# Patient Record
Sex: MALE | Race: WHITE | NOT HISPANIC OR LATINO | Employment: UNEMPLOYED | URBAN - METROPOLITAN AREA
[De-identification: names, ages, dates, MRNs, and addresses within clinical notes are randomized per-mention and may not be internally consistent; named-entity substitution may affect disease eponyms.]

---

## 2023-04-12 ENCOUNTER — TELEPHONE (OUTPATIENT)
Dept: PRIMARY CARE | Facility: CLINIC | Age: 33
End: 2023-04-12
Payer: MEDICAID

## 2023-04-12 NOTE — TELEPHONE ENCOUNTER
Pt went to the ER on Thursday due to left foot swelling and tingling at times. Pt was told to follow up with you if still swollen. It is painful all the time. Last night, pt was SOB but that is mostly gone now. No other symptoms.

## 2023-04-13 ENCOUNTER — OFFICE VISIT (OUTPATIENT)
Dept: PRIMARY CARE | Facility: CLINIC | Age: 33
End: 2023-04-13
Payer: MEDICAID

## 2023-04-13 ENCOUNTER — LAB (OUTPATIENT)
Dept: LAB | Facility: LAB | Age: 33
End: 2023-04-13
Payer: MEDICAID

## 2023-04-13 VITALS
RESPIRATION RATE: 20 BRPM | OXYGEN SATURATION: 96 % | WEIGHT: 258 LBS | BODY MASS INDEX: 34.95 KG/M2 | SYSTOLIC BLOOD PRESSURE: 108 MMHG | TEMPERATURE: 96.9 F | HEIGHT: 72 IN | HEART RATE: 76 BPM | DIASTOLIC BLOOD PRESSURE: 74 MMHG

## 2023-04-13 DIAGNOSIS — M79.89 SWELLING OF LEFT FOOT: Primary | ICD-10-CM

## 2023-04-13 DIAGNOSIS — Z79.899 MEDICATION MANAGEMENT: ICD-10-CM

## 2023-04-13 DIAGNOSIS — M25.40 JOINT SWELLING: ICD-10-CM

## 2023-04-13 PROBLEM — M54.50 LOW BACK PAIN: Status: ACTIVE | Noted: 2023-04-13

## 2023-04-13 PROBLEM — G43.019 COMMON MIGRAINE WITH INTRACTABLE MIGRAINE: Status: ACTIVE | Noted: 2019-12-05

## 2023-04-13 PROBLEM — H02.826 MILIA OF EYELID OF LEFT EYE: Status: ACTIVE | Noted: 2023-04-13

## 2023-04-13 PROBLEM — K92.1 HEMATOCHEZIA: Status: ACTIVE | Noted: 2023-04-13

## 2023-04-13 PROBLEM — R60.9 PERIPHERAL EDEMA: Status: ACTIVE | Noted: 2023-04-13

## 2023-04-13 PROBLEM — R13.10 DYSPHAGIA: Status: ACTIVE | Noted: 2023-04-13

## 2023-04-13 PROBLEM — G40.219: Status: ACTIVE | Noted: 2019-12-05

## 2023-04-13 PROBLEM — D17.1 LIPOMA OF ABDOMINAL WALL: Status: ACTIVE | Noted: 2023-04-13

## 2023-04-13 PROBLEM — R56.9 SEIZURE (MULTI): Status: ACTIVE | Noted: 2020-10-08

## 2023-04-13 PROBLEM — T14.91XA SUICIDAL BEHAVIOR WITH ATTEMPTED SELF-INJURY (MULTI): Status: ACTIVE | Noted: 2022-11-15

## 2023-04-13 PROBLEM — K64.9 HEMORRHOIDS: Status: ACTIVE | Noted: 2023-04-13

## 2023-04-13 PROBLEM — M79.662 PAIN OF LEFT CALF: Status: ACTIVE | Noted: 2023-04-13

## 2023-04-13 PROBLEM — K44.9 HIATAL HERNIA: Status: ACTIVE | Noted: 2023-04-13

## 2023-04-13 PROBLEM — F31.9 BIPOLAR DISORDER, UNSPECIFIED (MULTI): Status: ACTIVE | Noted: 2023-04-13

## 2023-04-13 PROBLEM — L08.9 SKIN INFECTION: Status: ACTIVE | Noted: 2023-04-13

## 2023-04-13 PROBLEM — J06.9 ACUTE URI: Status: ACTIVE | Noted: 2023-04-13

## 2023-04-13 PROBLEM — R44.0 AUDITORY HALLUCINATIONS: Status: ACTIVE | Noted: 2022-11-15

## 2023-04-13 PROBLEM — B37.2 CANDIDAL INTERTRIGO: Status: ACTIVE | Noted: 2023-04-13

## 2023-04-13 PROBLEM — R60.0 PERIPHERAL EDEMA: Status: ACTIVE | Noted: 2023-04-13

## 2023-04-13 PROBLEM — G51.4 FACIAL MYOKYMIA: Status: ACTIVE | Noted: 2019-12-05

## 2023-04-13 PROBLEM — L72.3 SEBACEOUS CYST: Status: ACTIVE | Noted: 2023-04-13

## 2023-04-13 PROBLEM — K21.9 GERD (GASTROESOPHAGEAL REFLUX DISEASE): Status: ACTIVE | Noted: 2023-04-13

## 2023-04-13 PROBLEM — M79.672 LEFT FOOT PAIN: Status: ACTIVE | Noted: 2023-04-13

## 2023-04-13 PROBLEM — L72.0 EPIDERMAL INCLUSION CYST: Status: ACTIVE | Noted: 2023-04-13

## 2023-04-13 PROBLEM — F64.9 GENDER DYSPHORIA: Status: ACTIVE | Noted: 2023-04-13

## 2023-04-13 PROBLEM — R10.9 ABDOMINAL PAIN OF UNKNOWN CAUSE: Status: ACTIVE | Noted: 2023-04-13

## 2023-04-13 PROBLEM — G40.209 PARTIAL SYMPTOMATIC EPILEPSY WITH COMPLEX PARTIAL SEIZURES, NOT INTRACTABLE, WITHOUT STATUS EPILEPTICUS (MULTI): Status: ACTIVE | Noted: 2023-04-13

## 2023-04-13 LAB
ALANINE AMINOTRANSFERASE (SGPT) (U/L) IN SER/PLAS: 26 U/L (ref 10–52)
ALBUMIN (G/DL) IN SER/PLAS: 4.3 G/DL (ref 3.4–5)
ALKALINE PHOSPHATASE (U/L) IN SER/PLAS: 59 U/L (ref 33–120)
ANION GAP IN SER/PLAS: 10 MMOL/L (ref 10–20)
ASPARTATE AMINOTRANSFERASE (SGOT) (U/L) IN SER/PLAS: 22 U/L (ref 9–39)
BILIRUBIN TOTAL (MG/DL) IN SER/PLAS: 0.5 MG/DL (ref 0–1.2)
C REACTIVE PROTEIN (MG/L) IN SER/PLAS: 0.42 MG/DL
CALCIUM (MG/DL) IN SER/PLAS: 8.5 MG/DL (ref 8.6–10.3)
CARBON DIOXIDE, TOTAL (MMOL/L) IN SER/PLAS: 25 MMOL/L (ref 21–32)
CHLORIDE (MMOL/L) IN SER/PLAS: 107 MMOL/L (ref 98–107)
CREATININE (MG/DL) IN SER/PLAS: 1.13 MG/DL (ref 0.5–1.3)
ESTRADIOL (PG/ML) IN SER/PLAS: 557 PG/ML
GFR MALE: 88 ML/MIN/1.73M2
GLUCOSE (MG/DL) IN SER/PLAS: 77 MG/DL (ref 74–99)
POTASSIUM (MMOL/L) IN SER/PLAS: 3.8 MMOL/L (ref 3.5–5.3)
PROTEIN TOTAL: 6.5 G/DL (ref 6.4–8.2)
SEDIMENTATION RATE, ERYTHROCYTE: 11 MM/H (ref 0–15)
SODIUM (MMOL/L) IN SER/PLAS: 138 MMOL/L (ref 136–145)
TESTOSTERONE (NG/DL) IN SER/PLAS: <60 NG/DL (ref 240–1000)
URATE (MG/DL) IN SER/PLAS: 6 MG/DL (ref 4–7.5)
UREA NITROGEN (MG/DL) IN SER/PLAS: 15 MG/DL (ref 6–23)

## 2023-04-13 PROCEDURE — 84403 ASSAY OF TOTAL TESTOSTERONE: CPT

## 2023-04-13 PROCEDURE — 99214 OFFICE O/P EST MOD 30 MIN: CPT | Performed by: FAMILY MEDICINE

## 2023-04-13 PROCEDURE — 80053 COMPREHEN METABOLIC PANEL: CPT

## 2023-04-13 PROCEDURE — 82670 ASSAY OF TOTAL ESTRADIOL: CPT

## 2023-04-13 PROCEDURE — 85652 RBC SED RATE AUTOMATED: CPT

## 2023-04-13 PROCEDURE — 86140 C-REACTIVE PROTEIN: CPT

## 2023-04-13 PROCEDURE — 36415 COLL VENOUS BLD VENIPUNCTURE: CPT

## 2023-04-13 PROCEDURE — 3008F BODY MASS INDEX DOCD: CPT | Performed by: FAMILY MEDICINE

## 2023-04-13 PROCEDURE — 1036F TOBACCO NON-USER: CPT | Performed by: FAMILY MEDICINE

## 2023-04-13 PROCEDURE — 84550 ASSAY OF BLOOD/URIC ACID: CPT

## 2023-04-13 PROCEDURE — 82679 ASSAY OF ESTRONE: CPT

## 2023-04-13 RX ORDER — ESTRADIOL VALERATE 20 MG/ML
INJECTION INTRAMUSCULAR WEEKLY
COMMUNITY
Start: 2021-05-11 | End: 2023-06-19 | Stop reason: SDUPTHER

## 2023-04-13 RX ORDER — SYRINGE WITH NEEDLE, 1 ML 25GX5/8"
SYRINGE, EMPTY DISPOSABLE MISCELLANEOUS
COMMUNITY
Start: 2023-03-25 | End: 2023-11-13

## 2023-04-13 RX ORDER — NYSTATIN 100000 U/G
OINTMENT TOPICAL 2 TIMES DAILY
COMMUNITY
Start: 2023-02-03 | End: 2023-06-19 | Stop reason: WASHOUT

## 2023-04-13 RX ORDER — LEVETIRACETAM 750 MG/1
1500 TABLET, EXTENDED RELEASE ORAL 2 TIMES DAILY
COMMUNITY
End: 2024-05-16 | Stop reason: SDUPTHER

## 2023-04-13 RX ORDER — GUAIFENESIN 1200 MG
325 TABLET, EXTENDED RELEASE 12 HR ORAL EVERY 6 HOURS PRN
COMMUNITY
Start: 2021-11-02 | End: 2023-06-19 | Stop reason: WASHOUT

## 2023-04-13 RX ORDER — PRAZOSIN HYDROCHLORIDE 2 MG/1
1 CAPSULE ORAL NIGHTLY
COMMUNITY
Start: 2021-06-02 | End: 2023-06-19 | Stop reason: WASHOUT

## 2023-04-13 RX ORDER — ZONISAMIDE 100 MG/1
2 CAPSULE ORAL NIGHTLY
COMMUNITY
Start: 2018-11-02

## 2023-04-13 RX ORDER — BICALUTAMIDE 50 MG/1
50 TABLET, FILM COATED ORAL
COMMUNITY
End: 2023-05-02

## 2023-04-13 RX ORDER — OMEPRAZOLE 20 MG/1
40 CAPSULE, DELAYED RELEASE ORAL DAILY
COMMUNITY
End: 2023-06-19 | Stop reason: WASHOUT

## 2023-04-13 RX ORDER — FUROSEMIDE 20 MG/1
20 TABLET ORAL DAILY
Qty: 5 TABLET | Refills: 0 | Status: SHIPPED | OUTPATIENT
Start: 2023-04-13 | End: 2024-01-08 | Stop reason: ALTCHOICE

## 2023-04-13 RX ORDER — TRAZODONE HYDROCHLORIDE 100 MG/1
100 TABLET ORAL NIGHTLY
COMMUNITY
Start: 2018-01-10

## 2023-04-13 RX ORDER — SPIRONOLACTONE 100 MG/1
100 TABLET, FILM COATED ORAL DAILY
COMMUNITY
End: 2023-06-19 | Stop reason: WASHOUT

## 2023-04-13 RX ORDER — ARIPIPRAZOLE 20 MG/1
20 TABLET ORAL DAILY
COMMUNITY
Start: 2022-11-22 | End: 2023-06-19 | Stop reason: WASHOUT

## 2023-04-13 RX ORDER — LAMOTRIGINE 200 MG/1
1 TABLET ORAL DAILY
COMMUNITY
Start: 2020-10-10 | End: 2024-05-16 | Stop reason: DRUGHIGH

## 2023-04-13 SDOH — ECONOMIC STABILITY: FOOD INSECURITY: WITHIN THE PAST 12 MONTHS, THE FOOD YOU BOUGHT JUST DIDN'T LAST AND YOU DIDN'T HAVE MONEY TO GET MORE.: NEVER TRUE

## 2023-04-13 SDOH — ECONOMIC STABILITY: FOOD INSECURITY: WITHIN THE PAST 12 MONTHS, YOU WORRIED THAT YOUR FOOD WOULD RUN OUT BEFORE YOU GOT MONEY TO BUY MORE.: NEVER TRUE

## 2023-04-13 ASSESSMENT — LIFESTYLE VARIABLES
AUDIT-C TOTAL SCORE: 0
SKIP TO QUESTIONS 9-10: 1
HOW MANY STANDARD DRINKS CONTAINING ALCOHOL DO YOU HAVE ON A TYPICAL DAY: PATIENT DOES NOT DRINK
HOW OFTEN DO YOU HAVE SIX OR MORE DRINKS ON ONE OCCASION: NEVER
HOW OFTEN DO YOU HAVE A DRINK CONTAINING ALCOHOL: NEVER

## 2023-04-13 ASSESSMENT — ENCOUNTER SYMPTOMS
LOSS OF SENSATION IN FEET: 0
DEPRESSION: 0
OCCASIONAL FEELINGS OF UNSTEADINESS: 0

## 2023-04-13 ASSESSMENT — PATIENT HEALTH QUESTIONNAIRE - PHQ9
SUM OF ALL RESPONSES TO PHQ9 QUESTIONS 1 & 2: 0
1. LITTLE INTEREST OR PLEASURE IN DOING THINGS: NOT AT ALL
2. FEELING DOWN, DEPRESSED OR HOPELESS: NOT AT ALL

## 2023-04-13 NOTE — PATIENT INSTRUCTIONS
Thank you for choosing [facility] for your healthcare.   As always if you have any questions or concerns please do not hesitate to call our office at [phone] or through [employer].    Have a great day!  Minda Veliz, DO

## 2023-04-13 NOTE — PROGRESS NOTES
"Subjective   Patient ID: Jennifer Terrell is a 32 y.o. male who presents for ER Follow-up (4/06/23 left foot pain and swelling.).  Edema    Presents with new edema. Episode onset: 8 days ago. The onset of the episode was sudden. These episodes happen throughout the day. The problem presents itself constantly. The problem has been waxing and waning. The edema is present on the left side(s). Risk factors for edema include no known risk factors.     Associated symptoms comment: allodynia.   Treatments tried include nothing.   Denies redness or specific joint pain. Overall is improved from when it started. The swelling is least first thing in the morning and worses as the day progresses. It is mostly localized to left foot though on the day she went to ER, the swelling was up to thigh. Ultrasound was negative for DVT 4/6.    Current Outpatient Medications   Medication Sig Dispense Refill    acetaminophen (TylenoL) 325 mg capsule Take 1 capsule (325 mg) by mouth every 6 hours if needed.      ARIPiprazole (Abilify) 20 mg tablet Take 1 tablet (20 mg) by mouth once daily.      BD Luer-Paul Syringe 3 mL 23 x 1\" syringe USE 1 SYRINGE ONCE A WEEK      estradiol valerate (Delestrogen) 20 mg/mL injection Inject into the shoulder, thigh, or buttocks per week.      lamoTRIgine (LaMICtal) 200 mg tablet Take 1 tablet (200 mg) by mouth once daily.      nystatin (Mycostatin) ointment Apply topically 2 times a day.      prazosin (Minipress) 2 mg capsule Take 1 capsule (2 mg) by mouth once daily at bedtime.      traZODone (Desyrel) 100 mg tablet Take 1 tablet (100 mg) by mouth once daily at bedtime.      zonisamide (Zonegran) 100 mg capsule Take 2 capsules (200 mg) by mouth once daily at bedtime.      bicalutamide (Casodex) 50 mg tablet Take 1 tablet (50 mg total) by mouth once daily.      furosemide (Lasix) 20 mg tablet Take 1 tablet (20 mg) by mouth once daily for 5 days. 5 tablet 0    levETIRAcetam XR (Keppra XR) 750 mg tablet extended " release 24 hr 24 hr tablet Take 2 tablets (1,500 mg) by mouth in the morning and 2 tablets (1,500 mg) before bedtime.      omeprazole (PriLOSEC) 20 mg DR capsule Take 2 capsules (40 mg) by mouth once daily.      spironolactone (Aldactone) 100 mg tablet Take 1 tablet (100 mg) by mouth once daily.       No current facility-administered medications for this visit.     Past Surgical History:   Procedure Laterality Date    INNER EAR SURGERY  03/08/2018    Inner Ear Surgery    OTHER SURGICAL HISTORY  04/05/2022    Oral surgery    OTHER SURGICAL HISTORY  08/06/2021    Esophagogastroduodenoscopy    OTHER SURGICAL HISTORY  08/06/2021    Colonoscopy    TONSILLECTOMY  03/08/2018    Tonsillectomy     No family history on file.   Social History     Tobacco Use    Smoking status: Never     Passive exposure: Never    Smokeless tobacco: Never   Substance Use Topics    Alcohol use: Not Currently    Drug use: Never        Objective     Visit Vitals  /74 (BP Location: Left arm, Patient Position: Sitting, BP Cuff Size: Large adult)   Pulse 76   Temp 36.1 °C (96.9 °F)   Resp 20   Ht 1.829 m (6')   Wt 117 kg (258 lb)   SpO2 96%   BMI 34.99 kg/m²   Smoking Status Never   BSA 2.44 m²        Constitutional: Well nourished, well developed, appears stated age  Eyes: no scleral icterus, no conjunctival injection  Respiratory: normal respiratory effort  Lower extremities: left foot and ankle currently mild swollen when compared to the left. Non-pitting edema. Tender to palpation. No erythema or skin breakdown.   Neurologic: Alert, CNs II-XII grossly intact..  Psych: Appropriate mood and affect.        Assessment/Plan   Problem List Items Addressed This Visit    None  Visit Diagnoses       Swelling of left foot    -  Primary    Relevant Medications    furosemide (Lasix) 20 mg tablet    Medication management        Relevant Orders    Comprehensive metabolic panel (Completed)    Estradiol    Testosterone    Estrone    Joint swelling         Relevant Orders    C-reactive protein (Completed)    Sedimentation Rate (Completed)    Uric acid (Completed)          Keep scheduled appointment.     Virginia Factor, DO

## 2023-04-19 LAB — ESTRONE: 115.8 PG/ML (ref 9–36)

## 2023-05-02 DIAGNOSIS — F64.9 GENDER DYSPHORIA: Primary | ICD-10-CM

## 2023-05-02 RX ORDER — PANTOPRAZOLE SODIUM 40 MG/1
TABLET, DELAYED RELEASE ORAL
COMMUNITY
Start: 2023-04-13 | End: 2023-06-19 | Stop reason: WASHOUT

## 2023-05-02 RX ORDER — BICALUTAMIDE 50 MG/1
50 TABLET, FILM COATED ORAL DAILY
Qty: 90 TABLET | Refills: 3 | Status: SHIPPED | OUTPATIENT
Start: 2023-05-02 | End: 2024-02-09 | Stop reason: SDUPTHER

## 2023-06-06 ENCOUNTER — TELEPHONE (OUTPATIENT)
Dept: PRIMARY CARE | Facility: CLINIC | Age: 33
End: 2023-06-06
Payer: MEDICAID

## 2023-06-06 DIAGNOSIS — J30.89 ENVIRONMENTAL AND SEASONAL ALLERGIES: Primary | ICD-10-CM

## 2023-06-06 NOTE — TELEPHONE ENCOUNTER
Pt called in and stated that they were given an allergy med while in the hospital. Pt states they are almost out and are needing a refill.     Pt's pharmacy is [facility]

## 2023-06-07 RX ORDER — CETIRIZINE HYDROCHLORIDE 10 MG/1
10 TABLET ORAL DAILY
Qty: 90 TABLET | Refills: 3 | Status: SHIPPED | OUTPATIENT
Start: 2023-06-07 | End: 2024-06-10 | Stop reason: SDUPTHER

## 2023-06-07 RX ORDER — CETIRIZINE HYDROCHLORIDE 10 MG/1
10 TABLET ORAL DAILY
Qty: 30 TABLET | Refills: 0 | COMMUNITY
Start: 2023-05-09 | End: 2023-06-07 | Stop reason: SDUPTHER

## 2023-06-15 PROBLEM — G43.019 REFRACTORY MIGRAINE WITHOUT AURA: Status: ACTIVE | Noted: 2019-12-05

## 2023-06-15 PROBLEM — G40.909 EPILEPSY (MULTI): Status: ACTIVE | Noted: 2023-06-15

## 2023-06-15 PROBLEM — Z78.9 MALE-TO-FEMALE TRANSGENDER PERSON: Status: ACTIVE | Noted: 2023-05-05

## 2023-06-15 PROBLEM — F32.A DEPRESSION: Status: ACTIVE | Noted: 2023-05-03

## 2023-06-15 RX ORDER — MULTIPLE VITAMINS W/ MINERALS TAB 9MG-400MCG
1 TAB ORAL DAILY
COMMUNITY
Start: 2023-05-09 | End: 2023-06-19 | Stop reason: WASHOUT

## 2023-06-15 RX ORDER — HYDROCORTISONE 25 MG/G
CREAM TOPICAL 2 TIMES DAILY
COMMUNITY
Start: 2023-06-09 | End: 2023-06-19 | Stop reason: WASHOUT

## 2023-06-15 RX ORDER — PROGESTERONE 200 MG/1
1 CAPSULE ORAL DAILY
COMMUNITY
Start: 2022-01-04 | End: 2023-06-19 | Stop reason: WASHOUT

## 2023-06-15 RX ORDER — ESOMEPRAZOLE MAGNESIUM 40 MG/1
CAPSULE, DELAYED RELEASE ORAL
COMMUNITY
Start: 2023-06-01 | End: 2023-06-19 | Stop reason: WASHOUT

## 2023-06-15 RX ORDER — MULTIVITAMIN
1 TABLET ORAL
COMMUNITY
End: 2023-07-18 | Stop reason: WASHOUT

## 2023-06-15 RX ORDER — DICYCLOMINE HYDROCHLORIDE 20 MG/1
20 TABLET ORAL
COMMUNITY
End: 2023-06-19 | Stop reason: WASHOUT

## 2023-06-15 RX ORDER — ARIPIPRAZOLE 15 MG/1
15 TABLET ORAL DAILY
COMMUNITY
End: 2024-02-08 | Stop reason: WASHOUT

## 2023-06-15 RX ORDER — PROGESTERONE 200 MG/1
200 CAPSULE ORAL DAILY
COMMUNITY
Start: 2023-05-08 | End: 2024-01-09

## 2023-06-15 RX ORDER — HYDROCORTISONE ACETATE 25 MG/1
25 SUPPOSITORY RECTAL 2 TIMES DAILY
COMMUNITY
Start: 2023-06-01 | End: 2024-01-08 | Stop reason: ALTCHOICE

## 2023-06-19 ENCOUNTER — OFFICE VISIT (OUTPATIENT)
Dept: PRIMARY CARE | Facility: CLINIC | Age: 33
End: 2023-06-19
Payer: MEDICAID

## 2023-06-19 ENCOUNTER — TELEPHONE (OUTPATIENT)
Dept: PRIMARY CARE | Facility: CLINIC | Age: 33
End: 2023-06-19

## 2023-06-19 VITALS
TEMPERATURE: 97.4 F | RESPIRATION RATE: 16 BRPM | SYSTOLIC BLOOD PRESSURE: 108 MMHG | HEIGHT: 72 IN | OXYGEN SATURATION: 97 % | WEIGHT: 276 LBS | HEART RATE: 73 BPM | DIASTOLIC BLOOD PRESSURE: 72 MMHG | BODY MASS INDEX: 37.38 KG/M2

## 2023-06-19 DIAGNOSIS — F31.9 BIPOLAR DEPRESSION (MULTI): ICD-10-CM

## 2023-06-19 DIAGNOSIS — F64.9 GENDER DYSPHORIA: Primary | ICD-10-CM

## 2023-06-19 DIAGNOSIS — J30.89 ENVIRONMENTAL AND SEASONAL ALLERGIES: ICD-10-CM

## 2023-06-19 DIAGNOSIS — G40.209 PARTIAL SYMPTOMATIC EPILEPSY WITH COMPLEX PARTIAL SEIZURES, NOT INTRACTABLE, WITHOUT STATUS EPILEPTICUS (MULTI): ICD-10-CM

## 2023-06-19 PROBLEM — J06.9 ACUTE URI: Status: RESOLVED | Noted: 2023-04-13 | Resolved: 2023-06-19

## 2023-06-19 PROBLEM — R44.0 AUDITORY HALLUCINATIONS: Status: RESOLVED | Noted: 2022-11-15 | Resolved: 2023-06-19

## 2023-06-19 PROBLEM — G43.019 COMMON MIGRAINE WITH INTRACTABLE MIGRAINE: Status: RESOLVED | Noted: 2019-12-05 | Resolved: 2023-06-19

## 2023-06-19 PROBLEM — B37.2 CANDIDAL INTERTRIGO: Status: RESOLVED | Noted: 2023-04-13 | Resolved: 2023-06-19

## 2023-06-19 PROBLEM — M79.662 PAIN OF LEFT CALF: Status: RESOLVED | Noted: 2023-04-13 | Resolved: 2023-06-19

## 2023-06-19 PROBLEM — L72.0 EPIDERMAL INCLUSION CYST: Status: RESOLVED | Noted: 2023-04-13 | Resolved: 2023-06-19

## 2023-06-19 PROBLEM — T14.91XA SUICIDAL BEHAVIOR WITH ATTEMPTED SELF-INJURY (MULTI): Status: RESOLVED | Noted: 2022-11-15 | Resolved: 2023-06-19

## 2023-06-19 PROBLEM — L08.9 SKIN INFECTION: Status: RESOLVED | Noted: 2023-04-13 | Resolved: 2023-06-19

## 2023-06-19 PROBLEM — K92.1 HEMATOCHEZIA: Status: RESOLVED | Noted: 2023-04-13 | Resolved: 2023-06-19

## 2023-06-19 PROBLEM — H02.826 MILIA OF EYELID OF LEFT EYE: Status: RESOLVED | Noted: 2023-04-13 | Resolved: 2023-06-19

## 2023-06-19 PROBLEM — M79.672 LEFT FOOT PAIN: Status: RESOLVED | Noted: 2023-04-13 | Resolved: 2023-06-19

## 2023-06-19 PROBLEM — Z78.9 MALE-TO-FEMALE TRANSGENDER PERSON: Status: RESOLVED | Noted: 2023-05-05 | Resolved: 2023-06-19

## 2023-06-19 PROBLEM — G40.219: Status: RESOLVED | Noted: 2019-12-05 | Resolved: 2023-06-19

## 2023-06-19 PROBLEM — L72.3 SEBACEOUS CYST: Status: RESOLVED | Noted: 2023-04-13 | Resolved: 2023-06-19

## 2023-06-19 PROBLEM — G40.909 EPILEPSY (MULTI): Status: RESOLVED | Noted: 2023-06-15 | Resolved: 2023-06-19

## 2023-06-19 PROBLEM — G43.019 REFRACTORY MIGRAINE WITHOUT AURA: Status: RESOLVED | Noted: 2019-12-05 | Resolved: 2023-06-19

## 2023-06-19 PROBLEM — R10.9 ABDOMINAL PAIN OF UNKNOWN CAUSE: Status: RESOLVED | Noted: 2023-04-13 | Resolved: 2023-06-19

## 2023-06-19 PROBLEM — R56.9 SEIZURE (MULTI): Status: RESOLVED | Noted: 2020-10-08 | Resolved: 2023-06-19

## 2023-06-19 PROCEDURE — 99214 OFFICE O/P EST MOD 30 MIN: CPT | Performed by: FAMILY MEDICINE

## 2023-06-19 PROCEDURE — 1036F TOBACCO NON-USER: CPT | Performed by: FAMILY MEDICINE

## 2023-06-19 PROCEDURE — 3008F BODY MASS INDEX DOCD: CPT | Performed by: FAMILY MEDICINE

## 2023-06-19 RX ORDER — ESTRADIOL VALERATE 20 MG/ML
INJECTION INTRAMUSCULAR
Qty: 5 ML | Refills: 11 | Status: SHIPPED | OUTPATIENT
Start: 2023-06-19 | End: 2024-01-09

## 2023-06-19 ASSESSMENT — ENCOUNTER SYMPTOMS
DEPRESSION: 0
OCCASIONAL FEELINGS OF UNSTEADINESS: 0
LOSS OF SENSATION IN FEET: 0

## 2023-06-19 NOTE — ASSESSMENT & PLAN NOTE
>>ASSESSMENT AND PLAN FOR GENDER DYSPHORIA WRITTEN ON 6/19/2023  2:54 PM BY SIMIN MILLER, DO    Continue estradiol weekly injection  Continue progesterone 200 mg daily and bicalutamide 50 mg daily

## 2023-06-19 NOTE — ASSESSMENT & PLAN NOTE
Recommended she try taking the cetirizine in the morning and to let me know if symptoms still do not improve

## 2023-06-19 NOTE — PATIENT INSTRUCTIONS
Thank you for choosing [facility] for your healthcare.   As always if you have any questions or concerns please do not hesitate to call our office at [phone] or through [employer].    Have a great day!  Minda Veliz, DO

## 2023-06-19 NOTE — PROGRESS NOTES
"Subjective   Patient ID: Jennifer Terrell is a 32 y.o. adult who presents for Follow-up (Would like to talk about estrogen and bottom surgery) and Mental Health Problem (Pt was hospitalized for a weekend due to mental health problems).  She plans on pursuing bottom surgery later this year. She sometimes experiences itching at injection site of estradiol.     She was hospitalized for about 4 days in May for suicidal ideation. She sees psychiatry and her therapist regularly.     She is currently on cetirizine for environmental allergies. She is taking it at night and still notices sneezing and nasal congestion during the day.          Current Outpatient Medications   Medication Sig Dispense Refill    ARIPiprazole (Abilify) 15 mg tablet Take 10 mg by mouth once daily.      BD Luer-Paul Syringe 3 mL 23 x 1\" syringe USE 1 SYRINGE ONCE A WEEK      bicalutamide (Casodex) 50 mg tablet Take 1 tablet (50 mg total) by mouth once daily. 90 tablet 3    cetirizine (ZyrTEC) 10 mg tablet Take 1 tablet (10 mg) by mouth once daily. Allergy medication 90 tablet 3    hydrocortisone (Anusol-HC) 25 mg suppository Insert 1 suppository (25 mg) into the rectum twice a day.      lamoTRIgine (LaMICtal) 200 mg tablet Take 1 tablet (200 mg) by mouth once daily.      levETIRAcetam XR (Keppra XR) 750 mg tablet extended release 24 hr 24 hr tablet Take 2 tablets (1,500 mg) by mouth 2 times a day.      multivitamin tablet Take 1 tablet by mouth once daily.      progesterone (Prometrium) 200 mg capsule Take 1 capsule (200 mg) by mouth once daily.      traZODone (Desyrel) 100 mg tablet Take 1 tablet (100 mg) by mouth once daily at bedtime.      zonisamide (Zonegran) 100 mg capsule Take 2 capsules (200 mg) by mouth once daily at bedtime.      estradiol valerate (Delestrogen) 20 mg/mL injection 0.4 mL injected IM weekly. Discard vial after 4th dose 5 mL 11    furosemide (Lasix) 20 mg tablet Take 1 tablet (20 mg) by mouth once daily for 5 days. 5 tablet 0 "     No current facility-administered medications for this visit.       Objective     Visit Vitals  /72 (BP Location: Right arm, Patient Position: Sitting, BP Cuff Size: Large adult)   Pulse 73   Temp 36.3 °C (97.4 °F)   Resp 16   Ht 1.829 m (6')   Wt 125 kg (276 lb)   SpO2 97%   BMI 37.43 kg/m²   Smoking Status Never   BSA 2.52 m²        Constitutional: Well nourished, well developed, appears stated age  Eyes: no scleral icterus, no conjunctival injection  Respiratory: normal respiratory effort  Musculoskeletal: No gross deformities appreciated  Skin: Warm, dry.  Neurologic: Alert, CNs II-XII grossly intact..  Psych: Appropriate mood and affect.        Assessment/Plan   Problem List Items Addressed This Visit       Gender dysphoria - Primary     Continue estradiol weekly injection  Continue progesterone 200 mg daily and bicalutamide 50 mg daily           Relevant Medications    estradiol valerate (Delestrogen) 20 mg/mL injection    Partial symptomatic epilepsy with complex partial seizures, not intractable, without status epilepticus (CMS/HCC)     Managed by neurology         Bipolar depression (CMS/HCC)     Managed by psychiatry   Hospitalized in May 2023 for suicidal ideation         Environmental and seasonal allergies     Recommended she try taking the cetirizine in the morning and to let me know if symptoms still do not improve            Follow up 4 months.    Minda Veliz, DO

## 2023-06-19 NOTE — ASSESSMENT & PLAN NOTE
Continue estradiol weekly injection  Continue progesterone 200 mg daily and bicalutamide 50 mg daily

## 2023-07-18 ENCOUNTER — OFFICE VISIT (OUTPATIENT)
Dept: PRIMARY CARE | Facility: CLINIC | Age: 33
End: 2023-07-18
Payer: MEDICAID

## 2023-07-18 VITALS
SYSTOLIC BLOOD PRESSURE: 126 MMHG | HEIGHT: 72 IN | OXYGEN SATURATION: 98 % | RESPIRATION RATE: 18 BRPM | WEIGHT: 276 LBS | BODY MASS INDEX: 37.38 KG/M2 | HEART RATE: 76 BPM | DIASTOLIC BLOOD PRESSURE: 83 MMHG | TEMPERATURE: 97.2 F

## 2023-07-18 DIAGNOSIS — M54.6 ACUTE LEFT-SIDED THORACIC BACK PAIN: Primary | ICD-10-CM

## 2023-07-18 PROCEDURE — 1036F TOBACCO NON-USER: CPT | Performed by: FAMILY MEDICINE

## 2023-07-18 PROCEDURE — 3008F BODY MASS INDEX DOCD: CPT | Performed by: FAMILY MEDICINE

## 2023-07-18 PROCEDURE — 99214 OFFICE O/P EST MOD 30 MIN: CPT | Performed by: FAMILY MEDICINE

## 2023-07-18 RX ORDER — TIZANIDINE 4 MG/1
4 TABLET ORAL EVERY 8 HOURS PRN
Qty: 30 TABLET | Refills: 0 | Status: SHIPPED | OUTPATIENT
Start: 2023-07-18 | End: 2024-01-08 | Stop reason: ALTCHOICE

## 2023-07-18 RX ORDER — PREDNISONE 50 MG/1
50 TABLET ORAL DAILY
Qty: 5 TABLET | Refills: 0 | Status: SHIPPED | OUTPATIENT
Start: 2023-07-18 | End: 2023-07-23

## 2023-07-18 ASSESSMENT — ENCOUNTER SYMPTOMS
OCCASIONAL FEELINGS OF UNSTEADINESS: 0
LOSS OF SENSATION IN FEET: 0
DEPRESSION: 0

## 2023-07-18 ASSESSMENT — PAIN SCALES - GENERAL: PAINLEVEL: 4

## 2023-07-18 NOTE — PROGRESS NOTES
"Subjective   Patient ID: Jennifer Terrell is a 32 y.o. adult who presents for ER Follow-up (Back pain).  She went to ER 7/1/2023 after developing left sided back pain which became severe. She denies any radiation of the pain. She was given rx for Ibuprofen and flexeril and discharged home. On 7/10/23 she went back to ER (different ER) because the pain had become severe again. They did xrays. She left before she was given those results.    The pain is in the left mid to lower back. It has gradually been moving up the left side of her back and now is up to bottom of shoulder blade. She denies any injury. Stretching is painful but does seem to help. Her pain is 4 out of 10 on average and spikes to 8 out of 10 with movement or if she stays in a particular position for too long.         Current Outpatient Medications   Medication Sig Dispense Refill    ARIPiprazole (Abilify) 15 mg tablet Take 1 tablet (15 mg) by mouth once daily.      BD Luer-Paul Syringe 3 mL 23 x 1\" syringe USE 1 SYRINGE ONCE A WEEK      bicalutamide (Casodex) 50 mg tablet Take 1 tablet (50 mg total) by mouth once daily. 90 tablet 3    cetirizine (ZyrTEC) 10 mg tablet Take 1 tablet (10 mg) by mouth once daily. Allergy medication 90 tablet 3    estradiol valerate (Delestrogen) 20 mg/mL injection 0.4 mL injected IM weekly. Discard vial after 4th dose 5 mL 11    hydrocortisone (Anusol-HC) 25 mg suppository Insert 1 suppository (25 mg) into the rectum twice a day.      lamoTRIgine (LaMICtal) 200 mg tablet Take 1 tablet (200 mg) by mouth once daily.      levETIRAcetam XR (Keppra XR) 750 mg tablet extended release 24 hr 24 hr tablet Take 2 tablets (1,500 mg) by mouth 2 times a day.      multivitamin with minerals (multivitamin) tablet Take 1 tablet by mouth once daily.      progesterone (Prometrium) 200 mg capsule Take 1 capsule (200 mg) by mouth once daily.      traZODone (Desyrel) 100 mg tablet Take 1 tablet (100 mg) by mouth once daily at bedtime.      " zonisamide (Zonegran) 100 mg capsule Take 2 capsules (200 mg) by mouth once daily at bedtime.      furosemide (Lasix) 20 mg tablet Take 1 tablet (20 mg) by mouth once daily for 5 days. 5 tablet 0    predniSONE (Deltasone) 50 mg tablet Take 1 tablet (50 mg) by mouth once daily for 5 days. 5 tablet 0    tiZANidine (Zanaflex) 4 mg tablet Take 1 tablet (4 mg) by mouth every 8 hours if needed for muscle spasms for up to 10 days. 30 tablet 0     No current facility-administered medications for this visit.       Objective     Visit Vitals  /83 (BP Location: Left arm, Patient Position: Sitting, BP Cuff Size: Large adult)   Pulse 76   Temp 36.2 °C (97.2 °F)   Resp 18   Ht 1.829 m (6')   Wt 125 kg (276 lb)   SpO2 98%   BMI 37.43 kg/m²   Smoking Status Never   BSA 2.52 m²        Constitutional: Well nourished, well developed, appears stated age  Eyes: no scleral icterus, no conjunctival injection  Respiratory: normal respiratory effort  Musculoskeletal: tenderness to gentle palpation of left paraspinal muscles from T8 - L1.  Skin: Warm, dry.  Neurologic: Alert, CNs II-XII grossly intact..  Psych: Appropriate mood and affect.        Assessment/Plan   Problem List Items Addressed This Visit    None  Visit Diagnoses       Acute left-sided thoracic back pain    -  Primary    Handout with stretches provided  Will have her take prednsione 50 mg x 5 days and tizanidine. She will call if no improvement. Also under care of chiropractor    Relevant Medications    predniSONE (Deltasone) 50 mg tablet    tiZANidine (Zanaflex) 4 mg tablet            All pertinent xrays and labs from ER were reviewed with patient.     Keep scheduled appointment.    Minda Veliz DO

## 2023-07-18 NOTE — PATIENT INSTRUCTIONS
Thank you for choosing [facility] for your healthcare.   As always if you have any questions or concerns please do not hesitate to call our office at [phone] or through [employer].    Have a great day!  Minda Veliz, DO

## 2023-08-29 ENCOUNTER — OFFICE VISIT (OUTPATIENT)
Dept: PRIMARY CARE | Facility: CLINIC | Age: 33
End: 2023-08-29
Payer: MEDICAID

## 2023-08-29 ENCOUNTER — TELEPHONE (OUTPATIENT)
Dept: PRIMARY CARE | Facility: CLINIC | Age: 33
End: 2023-08-29
Payer: MEDICAID

## 2023-08-29 VITALS
HEART RATE: 64 BPM | RESPIRATION RATE: 16 BRPM | SYSTOLIC BLOOD PRESSURE: 127 MMHG | BODY MASS INDEX: 33.63 KG/M2 | OXYGEN SATURATION: 99 % | DIASTOLIC BLOOD PRESSURE: 87 MMHG | TEMPERATURE: 96.7 F | WEIGHT: 248 LBS

## 2023-08-29 DIAGNOSIS — R51.9 SCALP PAIN: Primary | ICD-10-CM

## 2023-08-29 PROCEDURE — 99213 OFFICE O/P EST LOW 20 MIN: CPT | Performed by: STUDENT IN AN ORGANIZED HEALTH CARE EDUCATION/TRAINING PROGRAM

## 2023-08-29 PROCEDURE — 1036F TOBACCO NON-USER: CPT | Performed by: STUDENT IN AN ORGANIZED HEALTH CARE EDUCATION/TRAINING PROGRAM

## 2023-08-29 PROCEDURE — 3008F BODY MASS INDEX DOCD: CPT | Performed by: STUDENT IN AN ORGANIZED HEALTH CARE EDUCATION/TRAINING PROGRAM

## 2023-08-29 RX ORDER — LIDOCAINE 50 MG/G
OINTMENT TOPICAL
Qty: 60 G | Refills: 0 | Status: SHIPPED | OUTPATIENT
Start: 2023-08-29 | End: 2023-10-18 | Stop reason: ALTCHOICE

## 2023-08-29 SDOH — ECONOMIC STABILITY: FOOD INSECURITY: WITHIN THE PAST 12 MONTHS, YOU WORRIED THAT YOUR FOOD WOULD RUN OUT BEFORE YOU GOT MONEY TO BUY MORE.: NEVER TRUE

## 2023-08-29 SDOH — ECONOMIC STABILITY: FOOD INSECURITY: WITHIN THE PAST 12 MONTHS, THE FOOD YOU BOUGHT JUST DIDN'T LAST AND YOU DIDN'T HAVE MONEY TO GET MORE.: NEVER TRUE

## 2023-08-29 ASSESSMENT — ENCOUNTER SYMPTOMS
VOMITING: 0
CONSTIPATION: 0
MUSCULOSKELETAL NEGATIVE: 1
PALPITATIONS: 0
CONFUSION: 0
DIARRHEA: 0
UNEXPECTED WEIGHT CHANGE: 0
SHORTNESS OF BREATH: 0
HEADACHES: 0
CHILLS: 0
WHEEZING: 0
NAUSEA: 0
COLOR CHANGE: 0
COUGH: 0
FEVER: 0
FATIGUE: 0
ABDOMINAL PAIN: 0
DIZZINESS: 0

## 2023-08-29 ASSESSMENT — PAIN SCALES - GENERAL: PAINLEVEL: 4

## 2023-08-29 ASSESSMENT — LIFESTYLE VARIABLES
HOW OFTEN DO YOU HAVE A DRINK CONTAINING ALCOHOL: NEVER
AUDIT-C TOTAL SCORE: 0
HOW OFTEN DO YOU HAVE SIX OR MORE DRINKS ON ONE OCCASION: NEVER
SKIP TO QUESTIONS 9-10: 1
HOW MANY STANDARD DRINKS CONTAINING ALCOHOL DO YOU HAVE ON A TYPICAL DAY: PATIENT DOES NOT DRINK

## 2023-08-29 ASSESSMENT — PATIENT HEALTH QUESTIONNAIRE - PHQ9
2. FEELING DOWN, DEPRESSED OR HOPELESS: NOT AT ALL
SUM OF ALL RESPONSES TO PHQ9 QUESTIONS 1 & 2: 0
1. LITTLE INTEREST OR PLEASURE IN DOING THINGS: NOT AT ALL

## 2023-08-29 NOTE — PROGRESS NOTES
Subjective   Patient ID: Jennifer Terrell is a 32 y.o. adult who presents for Sick Visit (Head pain trauma to area years ago nothing recent ).    HPI   VF pt here for sick visit. Reports she is having pain on the back of her x a mo; reports her partner found a cut on back of her head, with greenish pus coming out last night; took pic on her phone but very blurry picture. Reports it hurts to move her head, contact to the area; affecting her sleep; feeling burning pain. Denies fever/chills, blurry/double vision, CP/SOB and other asso sx.     Review of Systems   Constitutional:  Negative for chills, fatigue, fever and unexpected weight change.   HENT: Negative.     Respiratory:  Negative for cough, shortness of breath and wheezing.    Cardiovascular:  Negative for chest pain, palpitations and leg swelling.   Gastrointestinal:  Negative for abdominal pain, constipation, diarrhea, nausea and vomiting.   Musculoskeletal: Negative.    Skin:  Negative for color change and rash.   Neurological:  Negative for dizziness and headaches.   Psychiatric/Behavioral:  Negative for behavioral problems and confusion.        Objective   /87 (BP Location: Left arm, Patient Position: Sitting, BP Cuff Size: Adult)   Pulse 64   Temp 35.9 °C (96.7 °F) (Temporal)   Resp 16   Wt 112 kg (248 lb)   SpO2 99%   BMI 33.63 kg/m²     Physical Exam  Vitals and nursing note reviewed.   Constitutional:       Appearance: Normal appearance.   Eyes:      Extraocular Movements: Extraocular movements intact.      Pupils: Pupils are equal, round, and reactive to light.   Cardiovascular:      Rate and Rhythm: Normal rate and regular rhythm.      Pulses: Normal pulses.      Heart sounds: Normal heart sounds.   Pulmonary:      Effort: Pulmonary effort is normal. No respiratory distress.      Breath sounds: Normal breath sounds.   Abdominal:      General: Abdomen is flat. Bowel sounds are normal.      Palpations: Abdomen is soft.   Musculoskeletal:          General: Normal range of motion.   Skin:     Comments: Head: mild ttp on the back of scalp but no wound, laceration, pus noted on detailed exam; nothing on her neck, behind her bl ears as well.    Neurological:      General: No focal deficit present.      Mental Status: She is alert and oriented to person, place, and time.   Psychiatric:         Mood and Affect: Mood normal.         Behavior: Behavior normal.       Assessment/Plan   VF pt here c/o scalp pain. She has very mild ttp on the back of her head but unable to find any laceration/wound and or greenish drainage after looking for 5-7 mins; also her friend who found last night looked all over her scalp for another 10 mins per pt request but couldn't locate the wound; could be small folliculitis that resolved but less likely big wound with drainage; will do trial of lidocaine gel as below. Also can take tylenol/IBU as needed. She is otherwise clinically & vitally stable.     Problem List Items Addressed This Visit    None  Visit Diagnoses       Scalp pain    -  Primary    Relevant Medications    lidocaine (Xylocaine) 5 % ointment          Rtc as schd to see VF    Ze Panda MD    [facility], [facility]

## 2023-08-29 NOTE — TELEPHONE ENCOUNTER
BACK OF HEAD RECENTLY HURTING. PATIENT STATES SOMEONE HAS SEEN A SCRATCH OR CUT ON IT. STATES IT IS TRYING TO SCAB OVER, BUT THE SCAB IS TURNING A GREENISH COLOR.     PLEASE ADVISE    PHARMACY: [facility], [place]

## 2023-08-29 NOTE — TELEPHONE ENCOUNTER
Called patient, notified of message, patient expressed verbal understanding had no questions at this time. Scheduled with Dr. Panda.

## 2023-08-29 NOTE — TELEPHONE ENCOUNTER
Does anyone have an acute appt available to evaluate this patient's wound? Thanks,  Minda Veliz, DO

## 2023-09-18 ENCOUNTER — TELEPHONE (OUTPATIENT)
Dept: PRIMARY CARE | Facility: CLINIC | Age: 33
End: 2023-09-18
Payer: MEDICAID

## 2023-09-18 NOTE — TELEPHONE ENCOUNTER
Pt is having symptoms from 2 days ago over worked and hurt her back and left arm went numb.  She could still use it just couldn't feel anything continued until yesterday.  When she rested it she would get the feeling again but as soon as she uses it, it will become numb again.  Should she be concerned and what should she do?    She told you about her trouble swallowing.  She thinks it may be because of gluten.  Any time she eats anything with gluten she has a swallowing issue.  Is there any test we can see if that is the problem?

## 2023-09-18 NOTE — TELEPHONE ENCOUNTER
The symptom she is describing with her arm going numb sounds like a pinched nerve from hurting her back. Usually this sort of problem will resolve in 2-4 weeks with rest. A course of prednisone can sometimes but with the stomach issues she has had, I would prefer to not use the prednisone unless this problem is not improving.     For the swallowing problem, there is a test for celiac disease which is when the body doesn't process gluten but I am not sure that it will be helpful in her case since her problem is that she has trouble swallowing after she eats gluten. I think she should continue to avoid gluten and see if she notices the swallowing problem with any other foods.     Thanks,  Minda Veliz, DO

## 2023-10-13 NOTE — PATIENT INSTRUCTIONS
Pt instructed to arrive in registration at 0900 for 1000 procedure pt instructed to take Abilify Lamictal and Keppra with small sip of water morning of procedure and have nothing to eat or drink after midnight (Pt states she has not taken any PPI )

## 2023-10-16 ENCOUNTER — HOSPITAL ENCOUNTER (OUTPATIENT)
Dept: GASTROENTEROLOGY | Facility: HOSPITAL | Age: 33
Setting detail: OUTPATIENT SURGERY
Discharge: HOME | End: 2023-10-16
Payer: MEDICAID

## 2023-10-16 DIAGNOSIS — K21.9 GASTROESOPHAGEAL REFLUX DISEASE, UNSPECIFIED WHETHER ESOPHAGITIS PRESENT: ICD-10-CM

## 2023-10-16 PROCEDURE — 91010 ESOPHAGUS MOTILITY STUDY: CPT

## 2023-10-16 PROCEDURE — 91010 ESOPHAGUS MOTILITY STUDY: CPT | Performed by: INTERNAL MEDICINE

## 2023-10-16 PROCEDURE — 91010 ESOPHAGUS MOTILITY STUDY: CPT | Mod: 26

## 2023-10-16 PROCEDURE — 91038 ESOPH IMPED FUNCT TEST > 1HR: CPT

## 2023-10-16 RX ORDER — LIDOCAINE HYDROCHLORIDE 20 MG/ML
SOLUTION OROPHARYNGEAL
Status: DISPENSED
Start: 2023-10-16 | End: 2023-10-16

## 2023-10-17 VITALS
HEART RATE: 66 BPM | RESPIRATION RATE: 18 BRPM | DIASTOLIC BLOOD PRESSURE: 86 MMHG | TEMPERATURE: 97.3 F | OXYGEN SATURATION: 99 % | SYSTOLIC BLOOD PRESSURE: 134 MMHG

## 2023-10-18 ENCOUNTER — TELEPHONE (OUTPATIENT)
Dept: PRIMARY CARE | Facility: CLINIC | Age: 33
End: 2023-10-18
Payer: MEDICAID

## 2023-10-18 NOTE — TELEPHONE ENCOUNTER
As far as I know, amnesia is not a symptom of migraines. Amnesia could be related to her seizures or possibly a side effect of her seizure medications. I recommend she reach out to her neurologist Dr. Tobar to see what he recommends.     Thanks,  Minda Veliz, DO

## 2023-10-18 NOTE — TELEPHONE ENCOUNTER
"Pt states that she has been having chronic migraines and as of last night, she has \"total amnesia\". Pt states she doesn't remember anything. She was told that she was having a migraine, laid down and woke up and didn't remember anything. She was \"told that you are her Dr and that she should call and let you know what is happening\".  I asked if she thought she should go to the ER to see if there is some kind of brain damage. She stated that she had been told that this happened before and was taken to the ER and they \"didn't do anything, so it won't accomplish much\".  Please advise.  "

## 2023-10-19 PROCEDURE — 91038 ESOPH IMPED FUNCT TEST > 1HR: CPT | Performed by: INTERNAL MEDICINE

## 2023-10-24 ENCOUNTER — LAB (OUTPATIENT)
Dept: LAB | Facility: LAB | Age: 33
End: 2023-10-24
Payer: MEDICAID

## 2023-10-24 DIAGNOSIS — Z79.899 OTHER LONG TERM (CURRENT) DRUG THERAPY: Primary | ICD-10-CM

## 2023-10-24 DIAGNOSIS — Z79.899 OTHER LONG TERM (CURRENT) DRUG THERAPY: ICD-10-CM

## 2023-10-24 LAB
ALBUMIN SERPL BCP-MCNC: 3.9 G/DL (ref 3.4–5)
ALP SERPL-CCNC: 59 U/L (ref 33–120)
ALT SERPL W P-5'-P-CCNC: 24 U/L (ref 7–52)
ANION GAP SERPL CALC-SCNC: 11 MMOL/L (ref 10–20)
AST SERPL W P-5'-P-CCNC: 19 U/L (ref 9–39)
BASOPHILS # BLD AUTO: 0.03 X10*3/UL (ref 0–0.1)
BASOPHILS NFR BLD AUTO: 0.3 %
BILIRUB SERPL-MCNC: 0.5 MG/DL (ref 0–1.2)
BUN SERPL-MCNC: 13 MG/DL (ref 6–23)
CALCIUM SERPL-MCNC: 9 MG/DL (ref 8.6–10.3)
CHLORIDE SERPL-SCNC: 107 MMOL/L (ref 98–107)
CHOLEST SERPL-MCNC: 145 MG/DL (ref 0–199)
CHOLESTEROL/HDL RATIO: 3.1
CO2 SERPL-SCNC: 22 MMOL/L (ref 21–32)
CREAT SERPL-MCNC: 0.94 MG/DL (ref 0.5–1.3)
EOSINOPHIL # BLD AUTO: 0.09 X10*3/UL (ref 0–0.7)
EOSINOPHIL NFR BLD AUTO: 1 %
ERYTHROCYTE [DISTWIDTH] IN BLOOD BY AUTOMATED COUNT: 12.5 % (ref 11.5–14.5)
GFR SERPL CREATININE-BSD FRML MDRD: 82 ML/MIN/1.73M*2
GLUCOSE SERPL-MCNC: 81 MG/DL (ref 74–99)
HCT VFR BLD AUTO: 39.4 % (ref 36–52)
HDLC SERPL-MCNC: 46.1 MG/DL
HGB BLD-MCNC: 13.2 G/DL (ref 12–17.5)
IMM GRANULOCYTES # BLD AUTO: 0.02 X10*3/UL (ref 0–0.7)
IMM GRANULOCYTES NFR BLD AUTO: 0.2 % (ref 0–0.9)
LDLC SERPL CALC-MCNC: 79 MG/DL
LYMPHOCYTES # BLD AUTO: 2.09 X10*3/UL (ref 1.2–4.8)
LYMPHOCYTES NFR BLD AUTO: 23.7 %
MCH RBC QN AUTO: 30.2 PG (ref 26–34)
MCHC RBC AUTO-ENTMCNC: 33.5 G/DL (ref 32–36)
MCV RBC AUTO: 90 FL (ref 80–100)
MONOCYTES # BLD AUTO: 0.63 X10*3/UL (ref 0.1–1)
MONOCYTES NFR BLD AUTO: 7.2 %
NEUTROPHILS # BLD AUTO: 5.95 X10*3/UL (ref 1.2–7.7)
NEUTROPHILS NFR BLD AUTO: 67.6 %
NON HDL CHOLESTEROL: 99 MG/DL (ref 0–149)
NRBC BLD-RTO: 0 /100 WBCS (ref 0–0)
PLATELET # BLD AUTO: 224 X10*3/UL (ref 150–450)
PMV BLD AUTO: 10.7 FL (ref 7.5–11.5)
POTASSIUM SERPL-SCNC: 3.8 MMOL/L (ref 3.5–5.3)
PROT SERPL-MCNC: 6 G/DL (ref 6.4–8.2)
RBC # BLD AUTO: 4.37 X10*6/UL (ref 4–5.9)
SODIUM SERPL-SCNC: 136 MMOL/L (ref 136–145)
TRIGL SERPL-MCNC: 102 MG/DL (ref 0–149)
VLDL: 20 MG/DL (ref 0–40)
WBC # BLD AUTO: 8.8 X10*3/UL (ref 4.4–11.3)

## 2023-10-24 PROCEDURE — 83036 HEMOGLOBIN GLYCOSYLATED A1C: CPT

## 2023-10-24 PROCEDURE — 85025 COMPLETE CBC W/AUTO DIFF WBC: CPT

## 2023-10-24 PROCEDURE — 80061 LIPID PANEL: CPT

## 2023-10-24 PROCEDURE — 36415 COLL VENOUS BLD VENIPUNCTURE: CPT

## 2023-10-24 PROCEDURE — 80053 COMPREHEN METABOLIC PANEL: CPT

## 2023-10-25 LAB
EST. AVERAGE GLUCOSE BLD GHB EST-MCNC: 103 MG/DL
HBA1C MFR BLD: 5.2 %

## 2023-10-30 ENCOUNTER — TELEPHONE (OUTPATIENT)
Dept: PRIMARY CARE | Facility: CLINIC | Age: 33
End: 2023-10-30

## 2023-10-30 ENCOUNTER — PATIENT OUTREACH (OUTPATIENT)
Dept: CARE COORDINATION | Facility: CLINIC | Age: 33
End: 2023-10-30

## 2023-10-30 ENCOUNTER — OFFICE VISIT (OUTPATIENT)
Dept: PRIMARY CARE | Facility: CLINIC | Age: 33
End: 2023-10-30
Payer: MEDICAID

## 2023-10-30 VITALS
TEMPERATURE: 96.8 F | SYSTOLIC BLOOD PRESSURE: 125 MMHG | BODY MASS INDEX: 33.91 KG/M2 | DIASTOLIC BLOOD PRESSURE: 85 MMHG | RESPIRATION RATE: 16 BRPM | OXYGEN SATURATION: 99 % | HEART RATE: 74 BPM | WEIGHT: 250 LBS

## 2023-10-30 DIAGNOSIS — R29.90 STROKE-LIKE SYMPTOMS: Primary | ICD-10-CM

## 2023-10-30 PROBLEM — E66.9 OBESITY, CLASS II, BMI 35-39.9: Status: ACTIVE | Noted: 2023-10-28

## 2023-10-30 PROBLEM — G45.9 TIA (TRANSIENT ISCHEMIC ATTACK): Status: ACTIVE | Noted: 2023-10-28

## 2023-10-30 PROBLEM — E66.812 OBESITY, CLASS II, BMI 35-39.9: Status: ACTIVE | Noted: 2023-10-28

## 2023-10-30 PROCEDURE — 3008F BODY MASS INDEX DOCD: CPT | Performed by: FAMILY MEDICINE

## 2023-10-30 PROCEDURE — 99495 TRANSJ CARE MGMT MOD F2F 14D: CPT | Performed by: FAMILY MEDICINE

## 2023-10-30 PROCEDURE — 1036F TOBACCO NON-USER: CPT | Performed by: FAMILY MEDICINE

## 2023-10-30 SDOH — ECONOMIC STABILITY: FOOD INSECURITY: WITHIN THE PAST 12 MONTHS, THE FOOD YOU BOUGHT JUST DIDN'T LAST AND YOU DIDN'T HAVE MONEY TO GET MORE.: NEVER TRUE

## 2023-10-30 SDOH — ECONOMIC STABILITY: FOOD INSECURITY: WITHIN THE PAST 12 MONTHS, YOU WORRIED THAT YOUR FOOD WOULD RUN OUT BEFORE YOU GOT MONEY TO BUY MORE.: NEVER TRUE

## 2023-10-30 ASSESSMENT — LIFESTYLE VARIABLES
HOW OFTEN DO YOU HAVE SIX OR MORE DRINKS ON ONE OCCASION: NEVER
AUDIT-C TOTAL SCORE: 1
HOW MANY STANDARD DRINKS CONTAINING ALCOHOL DO YOU HAVE ON A TYPICAL DAY: 1 OR 2
HOW OFTEN DO YOU HAVE A DRINK CONTAINING ALCOHOL: MONTHLY OR LESS
SKIP TO QUESTIONS 9-10: 1

## 2023-10-30 ASSESSMENT — PATIENT HEALTH QUESTIONNAIRE - PHQ9
SUM OF ALL RESPONSES TO PHQ9 QUESTIONS 1 & 2: 0
2. FEELING DOWN, DEPRESSED OR HOPELESS: NOT AT ALL
1. LITTLE INTEREST OR PLEASURE IN DOING THINGS: NOT AT ALL

## 2023-10-30 ASSESSMENT — PAIN SCALES - GENERAL: PAINLEVEL: 0-NO PAIN

## 2023-10-30 NOTE — PROGRESS NOTES
Discharge Facility: [facility]   Discharge Diagnosis: stroke like symptoms   Admission Date:10/28/23  Discharge Date: 10/29/23    PCP Appointment Date:10/30/23  Specialist Appointment Date:   Hospital Encounter and Summary: Linked  See discharge assessment below for further details  Engagement  Call Start Time: 0911 (10/30/2023  9:11 AM)    Medications  Medications reviewed with patient/caregiver?: Yes (10/30/2023  9:11 AM)  Is the patient having any side effects they believe may be caused by any medication additions or changes?: No (10/30/2023  9:11 AM)  Does the patient have all medications ordered at discharge?: Not applicable (10/30/2023  9:11 AM)  Is the patient taking all medications as directed (includes completed medication regime)?: Yes (10/30/2023  9:11 AM)    Appointments  Does the patient have a primary care provider?: Yes (10/30/2023  9:11 AM)  Care Management Interventions: Verified appointment date/time/provider (10/30/2023  9:11 AM)    Self Management  Has home health visited the patient within 72 hours of discharge?: Not applicable (10/30/2023  9:11 AM)  Has all Durable Medical Equipment (DME) been delivered?: No (10/30/2023  9:11 AM)    Patient Teaching  Does the patient have access to their discharge instructions?: No (she stated they did not give her discharge paperwork) (10/30/2023  9:11 AM)  Care Management Interventions: Reviewed instructions with patient (10/30/2023  9:11 AM)  What is the patient's perception of their health status since discharge?: Improving (10/30/2023  9:11 AM)    Wrap Up  Call End Time: 0915 (10/30/2023  9:11 AM)

## 2023-10-30 NOTE — PROGRESS NOTES
"Patient: Jennifer Terrell  : 1990  PCP: Minda Veliz DO  MRN: [id number]  Program: No linked episodes     Jennifer Terrell is a 33 y.o. adult presenting today for follow-up after being discharged from the hospital 1 days ago. The main problem requiring admission was stroke-like symptoms. She left AMA due to she was not getting her usual psychiatric medications and was having severe anxiety. She left before Mri of the brain could be completed which had been ordered by neurology. Symptoms have improved. She still feels that her left arm is weaker than the right and that her left leg has diminished sensation when compared to the right. She had a bad headache while hospitalized which has resolved.     The discharge summary and/or Transitional Care Management documentation was reviewed. Medication reconciliation was performed as indicated via the \"Fred as Reviewed\" timestamp.     Jennifer Terrell was contacted by Transitional Care Management services two days after her discharge. This encounter and supporting documentation was reviewed.    The complexity of medical decision making for this patient's transitional care is moderate.    Physical Exam  Constitutional: Well nourished, well developed, appears stated age  Eyes: no scleral icterus, no conjunctival injection  Respiratory: normal respiratory effort  Musculoskeletal: No gross deformities appreciated  Skin: Warm, dry.   Neurologic: Alert, CNs II-XII grossly intact..  Psych: Appropriate mood and affect.      Assessment/Plan   Problem List Items Addressed This Visit    None  Visit Diagnoses       Stroke-like symptoms    -  Primary    Neurology ordered brain MRI in patient which was not completed.  Ordered as outpatient    Relevant Orders    MR brain wo IV contrast            Review of Systems    Family History   Problem Relation Name Age of Onset    Other (cva) Other      Epilepsy Other      Ulcerative colitis Other      Hypertension Other   "       Engagement  Call Start Time: 0911 (10/30/2023  9:11 AM)    Medications  Medications reviewed with patient/caregiver?: Yes (10/30/2023  9:11 AM)  Is the patient having any side effects they believe may be caused by any medication additions or changes?: No (10/30/2023  9:11 AM)  Does the patient have all medications ordered at discharge?: Not applicable (10/30/2023  9:11 AM)  Is the patient taking all medications as directed (includes completed medication regime)?: Yes (10/30/2023  9:11 AM)    Appointments  Does the patient have a primary care provider?: Yes (10/30/2023  9:11 AM)  Care Management Interventions: Verified appointment date/time/provider (10/30/2023  9:11 AM)    Self Management  Has home health visited the patient within 72 hours of discharge?: Not applicable (10/30/2023  9:11 AM)  Has all Durable Medical Equipment (DME) been delivered?: No (10/30/2023  9:11 AM)    Patient Teaching  Does the patient have access to their discharge instructions?: No (she stated they did not give her discharge paperwork) (10/30/2023  9:11 AM)  Care Management Interventions: Reviewed instructions with patient (10/30/2023  9:11 AM)  What is the patient's perception of their health status since discharge?: Improving (10/30/2023  9:11 AM)    Wrap Up  Call End Time: 0915 (10/30/2023  9:11 AM)        Follow up 3 months.  Minda Veliz, DO

## 2023-10-30 NOTE — TELEPHONE ENCOUNTER
Can you let Jennifer know that I looked but couldn't find an estradiol auto-inject device (like an epi-pen) in the [place]? Thanks,  Minda Veliz, DO

## 2023-10-30 NOTE — PATIENT INSTRUCTIONS
Call Central Scheduling at [phone] to schedule the MRI of the brain.    Thank you for choosing [facility] for your healthcare.   As always if you have any questions or concerns please do not hesitate to call our office at [phone] or through [employer].    Have a great day!  Minda Veliz, DO

## 2023-11-10 ENCOUNTER — PATIENT OUTREACH (OUTPATIENT)
Dept: CARE COORDINATION | Facility: CLINIC | Age: 33
End: 2023-11-10
Payer: MEDICAID

## 2023-11-10 NOTE — PROGRESS NOTES
Call regarding appt. with PCP on 10/30/23 after hospitalization.  At time of outreach call the patient feels as if their condition has (improved  since last visit.  Reviewed the PCP appointment with the pt and addressed any questions or concerns.

## 2023-11-13 DIAGNOSIS — F64.9 GENDER DYSPHORIA: Primary | ICD-10-CM

## 2023-11-13 RX ORDER — CYCLOBENZAPRINE HCL 5 MG
5 TABLET ORAL 3 TIMES DAILY
COMMUNITY
Start: 2023-07-01 | End: 2023-07-11

## 2023-11-13 RX ORDER — IBUPROFEN 800 MG/1
TABLET ORAL
COMMUNITY
Start: 2023-07-01 | End: 2024-01-08 | Stop reason: ALTCHOICE

## 2023-11-13 RX ORDER — SYRINGE WITH NEEDLE, 1 ML 25GX5/8"
SYRINGE, EMPTY DISPOSABLE MISCELLANEOUS
Qty: 4 EACH | Refills: 11 | Status: SHIPPED | OUTPATIENT
Start: 2023-11-13 | End: 2024-02-09 | Stop reason: WASHOUT

## 2023-11-17 ENCOUNTER — HOSPITAL ENCOUNTER (OUTPATIENT)
Dept: RADIOLOGY | Facility: CLINIC | Age: 33
Discharge: HOME | End: 2023-11-17
Payer: MEDICAID

## 2023-11-17 DIAGNOSIS — R29.90 STROKE-LIKE SYMPTOMS: ICD-10-CM

## 2023-11-17 PROCEDURE — 70551 MRI BRAIN STEM W/O DYE: CPT | Performed by: RADIOLOGY

## 2023-11-17 PROCEDURE — 70551 MRI BRAIN STEM W/O DYE: CPT

## 2023-11-27 ENCOUNTER — PATIENT OUTREACH (OUTPATIENT)
Dept: CARE COORDINATION | Facility: CLINIC | Age: 33
End: 2023-11-27
Payer: MEDICAID

## 2023-12-06 ENCOUNTER — TELEPHONE (OUTPATIENT)
Dept: PRIMARY CARE | Facility: CLINIC | Age: 33
End: 2023-12-06
Payer: MEDICAID

## 2023-12-06 DIAGNOSIS — G89.29 CHRONIC LEFT-SIDED LOW BACK PAIN WITH LEFT-SIDED SCIATICA: Primary | ICD-10-CM

## 2023-12-06 DIAGNOSIS — M54.42 CHRONIC LEFT-SIDED LOW BACK PAIN WITH LEFT-SIDED SCIATICA: Primary | ICD-10-CM

## 2023-12-06 NOTE — TELEPHONE ENCOUNTER
Her symptoms sound like a pinched nerve. I think the next step would be to have her see a pain management doctor to see what else can be done since the chiropractic adjustments only provide short term relief. I can put in a referral if she would like.   Thanks,  Minda Veliz, DO

## 2023-12-06 NOTE — TELEPHONE ENCOUNTER
Back pain (8 to 9 on pain scale) for over a year.  Lower back pain on the left side.  When pain is in severe legs will go numb and feels like popping in her back.  She says feels like a warm sensation going down to her legs then legs will go numb. Vision gets black and white when having a lot of pain.  Does see a chiropractor (next appt tomorrow (12/6/23)) for readjustment.  Pain is relieved for only 24-48 hours after appt.  Just wondering what the next step is and what she should do.     [facility] [place]

## 2023-12-14 NOTE — TELEPHONE ENCOUNTER
Referral entered. Can you give her number for  PM in [place]? Or we can fax elsewhere if she would prefer.  Thanks,  Minda Veliz, DO

## 2024-01-04 ENCOUNTER — TELEPHONE (OUTPATIENT)
Dept: PRIMARY CARE | Facility: CLINIC | Age: 34
End: 2024-01-04
Payer: MEDICAID

## 2024-01-04 DIAGNOSIS — F64.9 GENDER DYSPHORIA: Primary | ICD-10-CM

## 2024-01-04 NOTE — TELEPHONE ENCOUNTER
Patient states that she is struggling with the estradiol injections and it is a challenge to use them. Pt is requesting to switch to tablets at a higher dosage than before? [facility] [address]

## 2024-01-06 NOTE — TELEPHONE ENCOUNTER
The oral estrogen interacts with her seizure medication and makes the seizure medication less effective which would increase her chances of having seizures.   Would she be willing to try estrogen patch instead?   Thanks,  Minda Veliz, DO

## 2024-01-08 ENCOUNTER — OFFICE VISIT (OUTPATIENT)
Dept: PAIN MEDICINE | Facility: HOSPITAL | Age: 34
End: 2024-01-08
Payer: MEDICAID

## 2024-01-08 VITALS
HEIGHT: 73 IN | BODY MASS INDEX: 32.98 KG/M2 | SYSTOLIC BLOOD PRESSURE: 117 MMHG | DIASTOLIC BLOOD PRESSURE: 77 MMHG | HEART RATE: 64 BPM | RESPIRATION RATE: 16 BRPM

## 2024-01-08 DIAGNOSIS — M54.42 CHRONIC LEFT-SIDED LOW BACK PAIN WITH LEFT-SIDED SCIATICA: ICD-10-CM

## 2024-01-08 DIAGNOSIS — G89.29 CHRONIC LEFT-SIDED LOW BACK PAIN WITH LEFT-SIDED SCIATICA: ICD-10-CM

## 2024-01-08 PROCEDURE — 99204 OFFICE O/P NEW MOD 45 MIN: CPT | Performed by: PHYSICAL MEDICINE & REHABILITATION

## 2024-01-08 PROCEDURE — 1036F TOBACCO NON-USER: CPT | Performed by: PHYSICAL MEDICINE & REHABILITATION

## 2024-01-08 PROCEDURE — 3008F BODY MASS INDEX DOCD: CPT | Performed by: PHYSICAL MEDICINE & REHABILITATION

## 2024-01-08 PROCEDURE — 99214 OFFICE O/P EST MOD 30 MIN: CPT | Performed by: PHYSICAL MEDICINE & REHABILITATION

## 2024-01-08 RX ORDER — TIZANIDINE 2 MG/1
2 TABLET ORAL EVERY 8 HOURS PRN
Qty: 90 TABLET | Refills: 2 | Status: SHIPPED | OUTPATIENT
Start: 2024-01-08 | End: 2024-05-17 | Stop reason: SDUPTHER

## 2024-01-08 RX ORDER — NAPROXEN 500 MG/1
500 TABLET ORAL
COMMUNITY
End: 2024-02-09 | Stop reason: WASHOUT

## 2024-01-08 ASSESSMENT — PATIENT HEALTH QUESTIONNAIRE - PHQ9
2. FEELING DOWN, DEPRESSED OR HOPELESS: NOT AT ALL
SUM OF ALL RESPONSES TO PHQ9 QUESTIONS 1 AND 2: 0
1. LITTLE INTEREST OR PLEASURE IN DOING THINGS: NOT AT ALL

## 2024-01-08 ASSESSMENT — ENCOUNTER SYMPTOMS
OCCASIONAL FEELINGS OF UNSTEADINESS: 0
DEPRESSION: 0
LOSS OF SENSATION IN FEET: 0

## 2024-01-08 NOTE — TELEPHONE ENCOUNTER
Pt has tried the patches, pills and patches, none of them have worked. The patches just fell off. So she isn't sure what you would suggest. Pt is willing to try the pills again. The first time your estrogen as high as they wanted it to be. Please advise.

## 2024-01-08 NOTE — PROGRESS NOTES
"Pain Management Clinic Note   Subjective   HPI: Jennifer Terrell \"Chris\" is a 33 y.o. transgender adult with history of epilepsy and mood disorder who presents for evaluation of low back pain.     The patient reports low back pain started about a year ago.   The pain radiates across the low back and can sometimes radiate down posterior bilateral thighs depending on activity. The pain is described as a \"lightning-like\" band, stabbing, achy that is worst with walking and standing. The pain is sometimes better with leaning forward. Sometimes has accompanying heaviness/fatigue of bilateral legs. Intermittently has gotten sharp pains. Patient recounts a time when she had a flare and \"popping\" in her back like hot water; went to ED with normal x-rays and received a toradol shot which helped. She has not participated in formal physical therapy. Has tried tylenol, aleve, ibuprofen without significant relief. Has also tried cyclobenzaprine without relief. Current pain is 6/10.     OWESTRY SCORE 56    Imaging 12/23/23 XR T and L spine without significant abnormalities     The pain causes significant stress in the patient's life, specifically interferes with general activity, mood, walking ability, ability to perform tasks at home and/or work. . Denies any bowel or bladder incontinence, saddle anesthesia, worsening pain, weakness or falls.    Review of Symptoms:   Constitutional: Negative for chills, diaphoresis or fever  HENT: Negative for neck swelling  Eyes:.  Negative for eye pain  Respiratory:.  Negative for cough, shortness of breath or wheezing    Cardiovascular:.  Negative for chest pain or palpitations  Gastrointestinal:.  Negative for abdominal pain, nausea and vomiting  Genitourinary:.  Negative for urgency  Musculoskeletal:  Positive for back pain. Positive for joint pain. Denies falls within the past 3 months.  Skin: Negative for wounds or itching   Neurological: Negative for dizziness, seizures, loss of " "consciousness and weakness  Endo/Heme/Allergies: Does not bruise/bleed easily  Psychiatric/Behavioral: Negative for depression. The patient does not appear anxious.          Current Outpatient Medications   Medication Instructions    ARIPiprazole (ABILIFY) 15 mg, oral, Daily    bicalutamide (CASODEX) 50 mg, oral, Daily    cetirizine (ZYRTEC) 10 mg, oral, Daily, Allergy medication    estradiol valerate (Delestrogen) 20 mg/mL injection 0.4 mL injected IM weekly. Discard vial after 4th dose    lamoTRIgine (LaMICtal) 200 mg tablet 1 tablet, oral, Daily    levETIRAcetam XR (KEPPRA XR) 1,500 mg, oral, 2 times daily    multivitamin with minerals (multivitamin) tablet 1 tablet, oral, Daily RT    naproxen (NAPROSYN) 500 mg, oral, 2 times daily with meals    progesterone (PROMETRIUM) 200 mg, oral, Daily    syringe with needle (BD Luer-Paul Syringe) 3 mL 23 x 1\" syringe USE 1 SYRINGE ONCE A WEEK    tiZANidine (ZANAFLEX) 2 mg, oral, Every 8 hours PRN    traZODone (DESYREL) 100 mg, oral, Nightly    zonisamide (Zonegran) 100 mg capsule 2 capsules, oral, Nightly        Past Medical History:   Diagnosis Date    ADHD (attention deficit hyperactivity disorder)     Anxiety     Chondromalacia patellae, right knee 10/24/2019    Chondromalacia of right patella    Depression     Dorsalgia, unspecified 06/24/2020    Acute back pain    Flushing 01/06/2021    Flushing    Generalized abdominal pain 11/02/2021    Abdominal pain, generalized    Local infection of the skin and subcutaneous tissue, unspecified 10/06/2020    Skin infection, bacterial    Nausea 03/10/2021    Nausea in adult    Other forms of dyspnea 03/08/2018    Dyspnea on exertion    Other hemorrhoids 07/15/2021    Internal hemorrhoids    Other skin changes 07/08/2021    Skin irritation    Otitis media, unspecified, left ear 05/01/2020    Acute otitis media, left    Personal history of COVID-19     Personal history of COVID-19    Personal history of diseases of the skin and " subcutaneous tissue 03/10/2021    History of dermatitis    Personal history of other diseases of the respiratory system 10/06/2021    History of sore throat    Personal history of other diseases of the respiratory system 10/06/2021    History of laryngitis    Personal history of other specified conditions 08/17/2021    History of dysphagia    Personal history of other specified conditions 05/03/2018    History of palpitations    Personal history of other specified conditions 05/03/2018    History of exertional chest pain    Personal history of other specified conditions 05/03/2018    History of syncope    Seasonal asthma         Past Surgical History:   Procedure Laterality Date    INNER EAR SURGERY  03/08/2018    Inner Ear Surgery    OTHER SURGICAL HISTORY  04/05/2022    Oral surgery    OTHER SURGICAL HISTORY  08/06/2021    Esophagogastroduodenoscopy    OTHER SURGICAL HISTORY  08/06/2021    Colonoscopy    TONSILLECTOMY  03/08/2018    Tonsillectomy        Family History   Problem Relation Name Age of Onset    Other (cva) Other      Epilepsy Other      Ulcerative colitis Other      Hypertension Other          Allergies   Allergen Reactions    Cinnamon Anaphylaxis     Artificial cinnamon flavored products    Penicillin V Potassium Anaphylaxis    Penicillins Anaphylaxis, Other, Swelling and Unknown     throat swelling, SOB    Petroleum Jelly [White Petrolatum] Hives     Cold, burning sensation and welts    Mineral Oil-Hydrophil Petrolat Rash     Burning sensation    Petrolatum,White Rash        Objective     Vitals:    01/08/24 0924   BP: 117/77   Pulse: 64   Resp: 16        Physical Exam    GENERAL EXAM  Vital Signs: Vital signs to include heart rate, respiration rate, blood pressure, and temperature were reviewed.  Exam  Constitutional: Awake, alert, well appearing, well developed. No acute distress, Patient appears stated age.  Ears, Nose, Mouth, and Throat: External ears and nose appear to be without deformity or  "rash. No lesions or masses noted. Hearing is grossly intact.  Head and Face: Examination of the head and face revealed no abnormalities.  Eyes: Conjunctiva non-icteric and eye lids are without obvious rash or drooping. Pupils are symmetric.  Neck: Normal appearing without swelling, tracheal position is midline.  Respiratory: No gasping or shortness of breath noted. Normal respiratory movements without use of accessory muscles.   Cardiovascular: No peripheral edema present.   Skin: No rashes or open lesions/ulcers identified on skin.  Psychiatric: Mood and affect are normal.    MSK:  No asymmetry or masses noted of the musculature.  Examination of the muscles/joints/bones show decreased range of motion.  The patient is tender to palpation along the facet joints, spinous processes, paraspinal muscles, but not SI joints  No significant pain with T-L flexion, extension, side-bending, or extension with backwards rotation (facet loading)  Able to toe and heel walk without difficulty   Pain with transitioning from sitting to stand and standing to lying down.    Neurologic:  Motor strength:  5/5 muscle strength of the lower extremities bilaterally and equal.    Sensation:  Sensation intact to light touch in the bilateral lower extremities.    Negative SLR (seated and supine) bilaterally   Negative LUCY bilaterally    Negative hayden or clonus bilaterally     Assessment/Plan   Jennifer Terrell \"Chris\" is a 33 y.o. transgender adult with history of epilepsy and mood disorder who presents for evaluation of low back pain over the past year.  Pain is located in the low back across like a band and sometimes radiates into bilateral thighs.  Pain is worse with standing and walking; better with leaning forward and sitting.  Exam notable for paraspinal tenderness.  Recent x-rays of lumbar spine without significant findings.  There is no MRI.  Patient has not completed formal physical therapy.  Has trialed numerous NSAIDs including " Aleve and ibuprofen; has also tried cyclobenzaprine without relief.  The patient's history is more suggestive of myofascial pain and lumbar spinal stenosis with neurogenic claudication/lumbar neuritis.  I recommend starting physical therapy for core strengthening.  If pain does not improve, with further evaluate with MRI of lumbar spine.    Plan  We will refer the patient physical therapy: A referral for physical therapy was provided to the patient. We discussed initiating physical therapy to help manage the patient's painful condition. The patient was counseled that muscle strengthening will improve the long term prognosis in regards to pain and may also help increase range of motion and mobility. The patient's questions were answered and he was agreeable to this course.   Ordered tizanidine 2mg q8grs as needed given the amount of mood stabilizers and antiseizure medicines patient is on I would not want to consider adding anything like gabapentin or duloxetine at this time..  Liver function testing within normal limits  Follow up in 6 weeks with my NP after physical therapy. Consider MRI Lumbar spine if not improved.    The patient was invited to contact us back anytime with any questions or concerns and follow-up with us in the office as needed.         Problem List Items Addressed This Visit       Low back pain    Relevant Medications    tiZANidine (Zanaflex) 2 mg tablet    Other Relevant Orders    Referral to Physical Therapy            This note was generated with the aid of dictation software, there may be typos despite my attempts at proofreading.     Lindsey Bo DO   Pain fellow     I saw and evaluated the patient. I personally obtained the key and critical portions of the history and physical exam or was physically present for key and critical portions performed by the resident/fellow. I reviewed the resident/fellow's documentation and discussed the patient with the resident/fellow. I agree with the  resident/fellow's medical decision making as documented in the note.    Noe Nuñez MD

## 2024-01-09 DIAGNOSIS — F64.9 GENDER DYSPHORIA: Primary | ICD-10-CM

## 2024-01-09 RX ORDER — ESTRADIOL 1.25 MG/1.25G
1.25 GEL TOPICAL DAILY
Qty: 37.5 G | Refills: 11 | Status: SHIPPED | OUTPATIENT
Start: 2024-01-09 | End: 2024-02-23 | Stop reason: SDUPTHER

## 2024-01-09 RX ORDER — PROGESTERONE 200 MG/1
200 CAPSULE ORAL DAILY
Qty: 90 CAPSULE | Refills: 3 | Status: SHIPPED | OUTPATIENT
Start: 2024-01-09 | End: 2024-02-09 | Stop reason: SDUPTHER

## 2024-01-09 NOTE — TELEPHONE ENCOUNTER
I just found out that there is a estradiol topical gel/cream. Would she be ok with trying this instead of going back on the pill? Thanks,  Minda Veliz, DO

## 2024-01-10 ENCOUNTER — TELEPHONE (OUTPATIENT)
Dept: PRIMARY CARE | Facility: CLINIC | Age: 34
End: 2024-01-10
Payer: MEDICAID

## 2024-01-25 ENCOUNTER — EVALUATION (OUTPATIENT)
Dept: PHYSICAL THERAPY | Facility: HOSPITAL | Age: 34
End: 2024-01-25
Payer: MEDICAID

## 2024-01-25 ENCOUNTER — PATIENT OUTREACH (OUTPATIENT)
Dept: CARE COORDINATION | Facility: CLINIC | Age: 34
End: 2024-01-25
Payer: MEDICAID

## 2024-01-25 DIAGNOSIS — M54.42 CHRONIC LEFT-SIDED LOW BACK PAIN WITH LEFT-SIDED SCIATICA: Primary | ICD-10-CM

## 2024-01-25 DIAGNOSIS — G89.29 CHRONIC LEFT-SIDED LOW BACK PAIN WITH LEFT-SIDED SCIATICA: Primary | ICD-10-CM

## 2024-01-25 PROBLEM — M54.50 CHRONIC LOW BACK PAIN: Status: RESOLVED | Noted: 2023-04-13 | Resolved: 2024-01-25

## 2024-01-25 PROCEDURE — 97162 PT EVAL MOD COMPLEX 30 MIN: CPT | Mod: GP | Performed by: PHYSICAL THERAPIST

## 2024-01-25 ASSESSMENT — ENCOUNTER SYMPTOMS
DEPRESSION: 1
LOSS OF SENSATION IN FEET: 1
OCCASIONAL FEELINGS OF UNSTEADINESS: 1

## 2024-01-25 ASSESSMENT — PAIN SCALES - GENERAL: PAINLEVEL_OUTOF10: 7

## 2024-01-25 ASSESSMENT — PAIN - FUNCTIONAL ASSESSMENT: PAIN_FUNCTIONAL_ASSESSMENT: 0-10

## 2024-01-25 NOTE — PROGRESS NOTES
Patient has met target of no readmission for (90) days post (hospital, discharge and is graduated from Transitional Care Management program at this time.

## 2024-01-25 NOTE — PROGRESS NOTES
"Physical Therapy    Physical Therapy Evaluation     Patient Name: Jennifer Terrell \"Chris\"  MRN: [id number]  Today's Date: 1/25/2024  Visit # 1   Time Calculation  Start Time: 0303  Stop Time: 0344  Time Calculation (min): 41 min   Visit #1 . Auth needed. No billed Tx    Subjective    \"Jennifer\" is  here for back pain 7/10     Precautions  Precautions  STEADI Fall Risk Score (The score of 4 or more indicates an increased risk of falling): 9 ( fell in shower 1-8-24)    Current Problem:   1. Chronic left-sided low back pain with left-sided sciatica  Referral to Physical Therapy          Pain:  Pain Assessment  Pain Assessment: 0-10  Pain Score: 7  Pain Type: Chronic pain  Pain Location: Back  Pain Orientation: Lower  Pain Radiating Towards: both legs tingling front and back and lateral  Pain Descriptors: Radiating, Tiring, Aching, Sore, Stabbing  Pain Frequency: Constant/continuous  Pain Onset:  (2022 , pain worse  , worsened after fell in shower 1-8-24)  Effect of Pain on Daily Activities: turning in bed pain, any one position is painful      Objective         Jennifer is a 33 y.o. transgender adult with history of epilepsy and mood disorder who presents for evaluation of low back pain.  Pain is located in the low back across like a band and sometimes radiates into bilateral thighs.  Pain is worse with standing and walking; better with leaning forward and sitting.  Exam notable for paraspinal tenderness.  Recent x-rays of lumbar spine without significant findings.  There is no MRI. Pt goes to chiropractor 1 x a month since 2022.  Hx of myofascial pain and lumbar spinal stenosis with neurogenic claudication/lumbar neuritis.   physical therapy for core strengthening. Follow up in 6 weeks with provider / NP after physical therapy.         Back pain x 2 years  at 3/10 , fell in shower 1-8-24 and pain increased to 6-7/10  Leg tingling bilateral x 2 years   Back pain with turning in bed  Laying 2 hours  Sitting 1 " hour  Standing 15 - 30 min  Enjoys power and games  Disabled  Sedentary  Chiropractic 1 x month since 2022    Extremity/Trunk Assessments:  Trunk AROM  Flex to reach knees  Side bend WNL    SKC Rt tight at 90,   left WNL  Figure 4  Rt tight , left WNL  HS 50 michael    MMT  Hip flex 4+/5    knee flex 5/5   knee ex 5/5  Ankle DF 5/5  Hip abd NA  Core 4-/5    Tender lumbar region    Pain with bed mobility - educate log roll    Outcome Measures:  LEFS 20    Treatments:  Eval  Auth needed for Tx  Initiate log roll instruction  Initiate pelvic tilt 10 x 5 , pain with release from tilt   Scapular retraction 10 x   Hamsting stretch 30 x 2   Next nustep and core strengthen    EDUCATION:  Outpatient Education  Individual(s) Educated: Patient  Education Provided: POC, Home Exercise Program, Posture  Plan of Care Discussed and Agreed Upon: yes  Patient Response to Education: Patient/Caregiver Verbalized Understanding of Information    Goals:  Active       PT Problem       PT Goal        Start:  01/25/24    Expected End:  02/15/24         1 Patient to be independent with home exercises within pain free range to stretch Lower extremities and to strengthen core abdominals and core stabilization to increase mobility  2 Patient to demonstrate understanding of what it means to have neutral posture alignment, the use of positioning strategies and body mechanics principles to reduce pain with everyday activities.  3 Patient to gain the functional range of motion necessary in the  lumbar spine to perform activities of daily living with increased ease.    Long term goals ( 4 week)  1 Patient to have reduced pain by 50% or more for increased function and mobility  2 Patient to demonstrate 1/3 MMT strength gain or more in targeted muscle groups and core strength for increased overall mobility  3 Patient to demonstrate a 4 point or more change in Oswestry to indicate reduced impairment with every day living   4 Patient to return to community  mobility and household chores and job related activity with pain levels managed using increased awareness of engaging core stabilization and strategies utilized to manage pain.            PT Goal        Start:  01/25/24    Expected End:  03/07/24       Long term goals   1 Patient to have reduced pain by 50% or more for increased function and mobility  2 Patient to demonstrate 1/3 MMT strength gain or more in targeted muscle groups and core strength for increased overall mobility  3 Patient to demonstrate a 4 point or more change in Oswestry to indicate reduced impairment with every day living   4 Patient to return to community mobility and household chores and job related activity with pain levels managed using increased awareness of engaging core stabilization and strategies utilized to manage pain.                      Active       PT Problem       PT Goal        Start:  01/25/24    Expected End:  02/15/24         1 Patient to be independent with home exercises within pain free range to stretch Lower extremities and to strengthen core abdominals and core stabilization to increase mobility  2 Patient to demonstrate understanding of what it means to have neutral posture alignment, the use of positioning strategies and body mechanics principles to reduce pain with everyday activities.  3 Patient to gain the functional range of motion necessary in the  lumbar spine to perform activities of daily living with increased ease.    Long term goals ( 4 week)  1 Patient to have reduced pain by 50% or more for increased function and mobility  2 Patient to demonstrate 1/3 MMT strength gain or more in targeted muscle groups and core strength for increased overall mobility  3 Patient to demonstrate a 4 point or more change in Oswestry to indicate reduced impairment with every day living   4 Patient to return to community mobility and household chores and job related activity with pain levels managed using increased awareness of  engaging core stabilization and strategies utilized to manage pain.            PT Goal        Start:  01/25/24    Expected End:  03/07/24       Long term goals   1 Patient to have reduced pain by 50% or more for increased function and mobility  2 Patient to demonstrate 1/3 MMT strength gain or more in targeted muscle groups and core strength for increased overall mobility  3 Patient to demonstrate a 4 point or more change in Oswestry to indicate reduced impairment with every day living   4 Patient to return to community mobility and household chores and job related activity with pain levels managed using increased awareness of engaging core stabilization and strategies utilized to manage pain.

## 2024-01-30 ENCOUNTER — TREATMENT (OUTPATIENT)
Dept: PHYSICAL THERAPY | Facility: HOSPITAL | Age: 34
End: 2024-01-30
Payer: MEDICAID

## 2024-01-30 DIAGNOSIS — G89.29 CHRONIC LEFT-SIDED LOW BACK PAIN WITH LEFT-SIDED SCIATICA: ICD-10-CM

## 2024-01-30 DIAGNOSIS — M54.42 CHRONIC LEFT-SIDED LOW BACK PAIN WITH LEFT-SIDED SCIATICA: ICD-10-CM

## 2024-01-30 PROCEDURE — 97110 THERAPEUTIC EXERCISES: CPT | Mod: GP,CQ

## 2024-02-01 ENCOUNTER — TREATMENT (OUTPATIENT)
Dept: PHYSICAL THERAPY | Facility: HOSPITAL | Age: 34
End: 2024-02-01
Payer: MEDICAID

## 2024-02-01 DIAGNOSIS — M54.42 CHRONIC LEFT-SIDED LOW BACK PAIN WITH LEFT-SIDED SCIATICA: ICD-10-CM

## 2024-02-01 DIAGNOSIS — G89.29 CHRONIC LEFT-SIDED LOW BACK PAIN WITH LEFT-SIDED SCIATICA: ICD-10-CM

## 2024-02-01 PROCEDURE — 97110 THERAPEUTIC EXERCISES: CPT | Mod: GP,CQ | Performed by: PHYSICAL THERAPY ASSISTANT

## 2024-02-01 ASSESSMENT — PAIN - FUNCTIONAL ASSESSMENT: PAIN_FUNCTIONAL_ASSESSMENT: 0-10

## 2024-02-01 ASSESSMENT — PAIN SCALES - GENERAL: PAINLEVEL_OUTOF10: 5 - MODERATE PAIN

## 2024-02-01 NOTE — PROGRESS NOTES
"Physical Therapy    Physical Therapy Treatment    Patient Name: Jennifer Terrell  MRN: [id number]  : 1990   Today's Date: 2024  Time Calculation  Start Time: 947  Stop Time:   Time Calculation (min): 40 min  Visit #3      Current Problem  1. Chronic left-sided low back pain with left-sided sciatica  Follow Up In Physical Therapy            Subjective   Pt reports  tired today, reports keeping up with HEP. States tingling in LE's is now intermittent. Pt reports a little sore after initial session. Reports chiropractor appointment after therapy today. States no Left knee pain.        Precautions  Precautions  STEADI Fall Risk Score (The score of 4 or more indicates an increased risk of falling): 9STEADI Fall Risk Score (The score of 4 or more indicates an increased risk of falling): 9 ( fell in shower 24)        Pain  Pain Assessment: 0-10  Pain Score: 5 - Moderate pain  Pain Type: Chronic pain  Pain Location: Back  Pain Orientation: Lower  Pain Radiating Towards:  (Occ BLE tingling)  Pain Frequency: Intermittent    Objective        Increased resistance with DLP.  Treatments:  EXERCISES       Date 24            VISIT# #2 #3 # #    REPS REPS REPS REPS          Nustep L2 10 min  L2 x 10 min            Shuttle   DLP 3B 2 x10 4B 2 x 10     Shuttle   SLP        2 B 2 x 10 each 2B 2 x 10      Shuttle   TR/HR                     Tband   Lat Pulls Green 2x 10 Green 2 x 10     Tband   Chops Green 2x 10 Green 2 x 10     Tband   Mid Row Green 2x 10 Green 2 x 10     Tband   Mower starts              SB  DKTC 3 sec x20 3\" 2 x 10     SB   LTR 5 sec x10 each  5\" x 10 each            Prone       Prone Prop       Prone Press up              PPT       PPT with hip add/abd       PPT with marches                     Stretches       Calf stretch  3x30 sec 3 x 30\"     Piriformis stretch                  Assessment:   Pt reports fatigue post session and reduced back pain.        Plan:   Cont with stretching and " strengthening to decrease pain.     OP EDUCATION:       Goals:  Active       PT Problem       PT Goal        Start:  01/25/24    Expected End:  02/15/24         1 Patient to be independent with home exercises within pain free range to stretch Lower extremities and to strengthen core abdominals and core stabilization to increase mobility  2 Patient to demonstrate understanding of what it means to have neutral posture alignment, the use of positioning strategies and body mechanics principles to reduce pain with everyday activities.  3 Patient to gain the functional range of motion necessary in the  lumbar spine to perform activities of daily living with increased ease.    Long term goals ( 4 week)  1 Patient to have reduced pain by 50% or more for increased function and mobility  2 Patient to demonstrate 1/3 MMT strength gain or more in targeted muscle groups and core strength for increased overall mobility  3 Patient to demonstrate a 4 point or more change in Oswestry to indicate reduced impairment with every day living   4 Patient to return to community mobility and household chores and job related activity with pain levels managed using increased awareness of engaging core stabilization and strategies utilized to manage pain.            PT Goal        Start:  01/25/24    Expected End:  03/07/24       Long term goals   1 Patient to have reduced pain by 50% or more for increased function and mobility  2 Patient to demonstrate 1/3 MMT strength gain or more in targeted muscle groups and core strength for increased overall mobility  3 Patient to demonstrate a 4 point or more change in Oswestry to indicate reduced impairment with every day living   4 Patient to return to community mobility and household chores and job related activity with pain levels managed using increased awareness of engaging core stabilization and strategies utilized to manage pain.                   .

## 2024-02-07 ENCOUNTER — TREATMENT (OUTPATIENT)
Dept: PHYSICAL THERAPY | Facility: HOSPITAL | Age: 34
End: 2024-02-07
Payer: MEDICAID

## 2024-02-07 DIAGNOSIS — G89.29 CHRONIC LEFT-SIDED LOW BACK PAIN WITH LEFT-SIDED SCIATICA: ICD-10-CM

## 2024-02-07 DIAGNOSIS — M54.42 CHRONIC LEFT-SIDED LOW BACK PAIN WITH LEFT-SIDED SCIATICA: ICD-10-CM

## 2024-02-07 PROCEDURE — 97110 THERAPEUTIC EXERCISES: CPT | Mod: GP,CQ

## 2024-02-07 NOTE — PROGRESS NOTES
"Physical Therapy    Physical Therapy Treatment    Patient Name: Jennifer Terrell  MRN: [id number]  : 1990   Today's Date: 2024  Time Calculation  Start Time: 301  Stop Time: 345  Time Calculation (min): 44 min  Visit #4      Current Problem  1. Chronic left-sided low back pain with left-sided sciatica  Follow Up In Physical Therapy            Subjective   Pt reports pain increases with standing and amb to 7/10, while sitting 5/10 pain.        Precautions     STEADI Fall Risk Score (The score of 4 or more indicates an increased risk of falling): 9STEADI Fall Risk Score (The score of 4 or more indicates an increased risk of falling): 9 ( fell in shower 24)       Pain  7/10     Objective    See flow sheet         Treatments:  EXERCISES       Date 24           VISIT# #2 #3 #4 #    REPS REPS REPS REPS          Nustep L2 10 min  L2 x 10 min L2 x 10 min           Shuttle   DLP 3B 2 x10 4B 2 x 10 4B 2 x 10    Shuttle   SLP        2 B 2 x 10 each 2B 2 x 10  2B 2 x 10     Shuttle   TR/HR                     Tband   Lat Pulls Green 2x 10 Green 2 x 10 Green 2 x 10    Tband   Chops Green 2x 10 Green 2 x 10 Green 2 x 10    Tband   Mid Row Green 2x 10 Green 2 x 10 Green 2 x 10    Tband   Mower starts              SB  DKTC 3 sec x20 3\" 2 x 10 3\" 2 x 10    SB   LTR 5 sec x10 each  5\" x 10 each 5\" x 10 each           Prone       Prone Prop       Prone Press up              PPT       PPT with hip add/abd       PPT with marches                     Stretches       Calf stretch  3x30 sec 3 x 30\" 3 x 30\"    Piriformis stretch                 Assessment:  Pt reports pain decreased some after treatment, did not rate pain.        Plan:   Cont with POC to decrease low back pain     OP EDUCATION:       Goals:  Active       PT Problem       PT Goal        Start:  24    Expected End:  02/15/24         1 Patient to be independent with home exercises within pain free range to stretch Lower extremities and to " strengthen core abdominals and core stabilization to increase mobility  2 Patient to demonstrate understanding of what it means to have neutral posture alignment, the use of positioning strategies and body mechanics principles to reduce pain with everyday activities.  3 Patient to gain the functional range of motion necessary in the  lumbar spine to perform activities of daily living with increased ease.    Long term goals ( 4 week)  1 Patient to have reduced pain by 50% or more for increased function and mobility  2 Patient to demonstrate 1/3 MMT strength gain or more in targeted muscle groups and core strength for increased overall mobility  3 Patient to demonstrate a 4 point or more change in Oswestry to indicate reduced impairment with every day living   4 Patient to return to community mobility and household chores and job related activity with pain levels managed using increased awareness of engaging core stabilization and strategies utilized to manage pain.            PT Goal        Start:  01/25/24    Expected End:  03/07/24       Long term goals   1 Patient to have reduced pain by 50% or more for increased function and mobility  2 Patient to demonstrate 1/3 MMT strength gain or more in targeted muscle groups and core strength for increased overall mobility  3 Patient to demonstrate a 4 point or more change in Oswestry to indicate reduced impairment with every day living   4 Patient to return to community mobility and household chores and job related activity with pain levels managed using increased awareness of engaging core stabilization and strategies utilized to manage pain.                   .

## 2024-02-08 RX ORDER — ARIPIPRAZOLE 20 MG/1
20 TABLET ORAL DAILY
COMMUNITY
Start: 2024-02-02

## 2024-02-08 NOTE — PROGRESS NOTES
Subjective   Patient ID: Jennifer Terrell is a 33 y.o. adult who presents for Follow-up (3 month follow-up, no concerns. Declines flu vaccine.).  She started physical therapy for her back which she feels has helped some.    She sometimes feels that everyone is out to get her. This can be girlfriend or roommate and sometimes even her home.     She was prescribed something for acid reflux about a month ago which worked well. She would like refill but doesn't know what the name of the medication is.         Patient Care Team:  Minda Veliz DO as PCP - General  Nadia Orantes as Therapist (Psychology)  DAMION Macdonald-CNP as Nurse Practitioner (Gastroenterology)  Dipti Rosa MD MPH as Consulting Physician (Urology)  DAMION Starks-CNS as Nurse Practitioner (Psychiatry)    Current Outpatient Medications   Medication Sig Dispense Refill    ARIPiprazole (Abilify) 20 mg tablet Take 1 tablet (20 mg) by mouth once daily.      cetirizine (ZyrTEC) 10 mg tablet Take 1 tablet (10 mg) by mouth once daily. Allergy medication 90 tablet 3    estradioL 1.25 mg/1.25 gram (0.1 %) gel in packet Place 1.25 mg on the skin once daily. 37.5 g 11    lamoTRIgine (LaMICtal) 200 mg tablet Take 1 tablet (200 mg) by mouth once daily.      levETIRAcetam XR (Keppra XR) 750 mg tablet extended release 24 hr 24 hr tablet Take 2 tablets (1,500 mg) by mouth 2 times a day.      multivitamin with minerals (multivitamin) tablet Take 1 tablet by mouth once daily.      tiZANidine (Zanaflex) 2 mg tablet Take 1 tablet (2 mg) by mouth every 8 hours if needed for muscle spasms. 90 tablet 2    traZODone (Desyrel) 100 mg tablet Take 1 tablet (100 mg) by mouth once daily at bedtime.      zonisamide (Zonegran) 100 mg capsule Take 2 capsules (200 mg) by mouth once daily at bedtime.      bicalutamide (Casodex) 50 mg tablet Take 1 tablet (50 mg total) by mouth once daily. 90 tablet 3    famotidine (Pepcid) 40 mg tablet Take 1 tablet (40 mg) by mouth  "2 times a day. 60 tablet 5    naproxen (Naprosyn) 500 mg tablet Take 1 tablet (500 mg) by mouth 2 times a day with meals.      progesterone (Prometrium) 200 mg capsule Take 1 capsule (200 mg) by mouth once daily. 90 capsule 3     No current facility-administered medications for this visit.       Objective     Visit Vitals  /74 (BP Location: Right arm, Patient Position: Sitting, BP Cuff Size: Large adult)   Pulse 74   Temp 36.3 °C (97.3 °F)   Resp 18   Ht 1.854 m (6' 1\")   Wt 119 kg (263 lb)   SpO2 96%   BMI 34.70 kg/m²   Smoking Status Never   BSA 2.48 m²        Constitutional: Well nourished, well developed, appears stated age  Eyes: no scleral icterus, no conjunctival injection  Respiratory: normal respiratory effort  Musculoskeletal: No gross deformities appreciated  Skin: Warm, dry.  Neurologic: Alert, CNs II-XII grossly intact..  Psych: Appropriate mood and affect.        Assessment/Plan   Problem List Items Addressed This Visit       Gender dysphoria     She recently started topical estradiol, she will wait a few weeks to do labs  Continue progesterone 200 mg daily and bicalutamide 50 mg daily           Relevant Medications    progesterone (Prometrium) 200 mg capsule    bicalutamide (Casodex) 50 mg tablet    GERD (gastroesophageal reflux disease) - Primary    Relevant Medications    famotidine (Pepcid) 40 mg tablet    Chronic low back pain     Established with PM 1/8/24, they ordered PT with plan on MRI if symptoms did not improve  Jennifer has been doing PT and does feel that it is mitigating some of the pain.           Follow up 3 months.    Minda Veliz,   "

## 2024-02-09 ENCOUNTER — OFFICE VISIT (OUTPATIENT)
Dept: PRIMARY CARE | Facility: CLINIC | Age: 34
End: 2024-02-09
Payer: MEDICAID

## 2024-02-09 VITALS
OXYGEN SATURATION: 96 % | BODY MASS INDEX: 34.85 KG/M2 | WEIGHT: 263 LBS | SYSTOLIC BLOOD PRESSURE: 116 MMHG | HEIGHT: 73 IN | RESPIRATION RATE: 18 BRPM | TEMPERATURE: 97.3 F | HEART RATE: 74 BPM | DIASTOLIC BLOOD PRESSURE: 74 MMHG

## 2024-02-09 DIAGNOSIS — G89.29 CHRONIC BILATERAL LOW BACK PAIN WITHOUT SCIATICA: ICD-10-CM

## 2024-02-09 DIAGNOSIS — F64.9 GENDER DYSPHORIA: ICD-10-CM

## 2024-02-09 DIAGNOSIS — K21.9 GASTROESOPHAGEAL REFLUX DISEASE WITHOUT ESOPHAGITIS: Primary | ICD-10-CM

## 2024-02-09 DIAGNOSIS — M54.50 CHRONIC BILATERAL LOW BACK PAIN WITHOUT SCIATICA: ICD-10-CM

## 2024-02-09 PROBLEM — M54.9 CHRONIC BACK PAIN: Status: RESOLVED | Noted: 2024-02-09 | Resolved: 2024-02-09

## 2024-02-09 PROBLEM — H91.92 DEAF, LEFT: Status: ACTIVE | Noted: 2024-02-09

## 2024-02-09 PROBLEM — M54.9 CHRONIC BACK PAIN: Status: ACTIVE | Noted: 2024-02-09

## 2024-02-09 PROCEDURE — 99213 OFFICE O/P EST LOW 20 MIN: CPT | Performed by: FAMILY MEDICINE

## 2024-02-09 PROCEDURE — 1036F TOBACCO NON-USER: CPT | Performed by: FAMILY MEDICINE

## 2024-02-09 RX ORDER — BICALUTAMIDE 50 MG/1
50 TABLET, FILM COATED ORAL DAILY
Qty: 90 TABLET | Refills: 3 | Status: SHIPPED | OUTPATIENT
Start: 2024-02-09 | End: 2025-02-08

## 2024-02-09 RX ORDER — FAMOTIDINE 40 MG/1
40 TABLET, FILM COATED ORAL 2 TIMES DAILY
Qty: 60 TABLET | Refills: 5 | Status: SHIPPED | OUTPATIENT
Start: 2024-02-09 | End: 2024-08-07

## 2024-02-09 RX ORDER — PROGESTERONE 200 MG/1
200 CAPSULE ORAL DAILY
Qty: 90 CAPSULE | Refills: 3 | Status: SHIPPED | OUTPATIENT
Start: 2024-02-09

## 2024-02-09 SDOH — ECONOMIC STABILITY: FOOD INSECURITY: WITHIN THE PAST 12 MONTHS, THE FOOD YOU BOUGHT JUST DIDN'T LAST AND YOU DIDN'T HAVE MONEY TO GET MORE.: NEVER TRUE

## 2024-02-09 SDOH — ECONOMIC STABILITY: FOOD INSECURITY: WITHIN THE PAST 12 MONTHS, YOU WORRIED THAT YOUR FOOD WOULD RUN OUT BEFORE YOU GOT MONEY TO BUY MORE.: NEVER TRUE

## 2024-02-09 ASSESSMENT — ENCOUNTER SYMPTOMS
DEPRESSION: 0
LOSS OF SENSATION IN FEET: 0
OCCASIONAL FEELINGS OF UNSTEADINESS: 1

## 2024-02-09 ASSESSMENT — PAIN SCALES - GENERAL: PAINLEVEL: 0-NO PAIN

## 2024-02-09 ASSESSMENT — LIFESTYLE VARIABLES
HOW OFTEN DO YOU HAVE SIX OR MORE DRINKS ON ONE OCCASION: NEVER
HOW MANY STANDARD DRINKS CONTAINING ALCOHOL DO YOU HAVE ON A TYPICAL DAY: PATIENT DOES NOT DRINK
HOW OFTEN DO YOU HAVE A DRINK CONTAINING ALCOHOL: NEVER
AUDIT-C TOTAL SCORE: 0
SKIP TO QUESTIONS 9-10: 1

## 2024-02-09 NOTE — ASSESSMENT & PLAN NOTE
>>ASSESSMENT AND PLAN FOR GENDER DYSPHORIA WRITTEN ON 2/9/2024  2:10 PM BY SIMIN MILLER, DO    She recently started topical estradiol, she will wait a few weeks to do labs  Continue progesterone 200 mg daily and bicalutamide 50 mg daily

## 2024-02-09 NOTE — ASSESSMENT & PLAN NOTE
She recently started topical estradiol, she will wait a few weeks to do labs  Continue progesterone 200 mg daily and bicalutamide 50 mg daily

## 2024-02-09 NOTE — ASSESSMENT & PLAN NOTE
Established with PM 1/8/24, they ordered PT with plan on MRI if symptoms did not improve  Jennifer has been doing PT and does feel that it is mitigating some of the pain.

## 2024-02-12 ENCOUNTER — TREATMENT (OUTPATIENT)
Dept: PHYSICAL THERAPY | Facility: HOSPITAL | Age: 34
End: 2024-02-12
Payer: MEDICAID

## 2024-02-12 DIAGNOSIS — M54.42 CHRONIC LEFT-SIDED LOW BACK PAIN WITH LEFT-SIDED SCIATICA: Primary | ICD-10-CM

## 2024-02-12 DIAGNOSIS — G89.29 CHRONIC LEFT-SIDED LOW BACK PAIN WITH LEFT-SIDED SCIATICA: Primary | ICD-10-CM

## 2024-02-12 PROCEDURE — 97110 THERAPEUTIC EXERCISES: CPT | Mod: GP | Performed by: PHYSICAL THERAPIST

## 2024-02-12 ASSESSMENT — PAIN SCALES - GENERAL: PAINLEVEL_OUTOF10: 8

## 2024-02-12 ASSESSMENT — PAIN - FUNCTIONAL ASSESSMENT: PAIN_FUNCTIONAL_ASSESSMENT: 0-10

## 2024-02-12 NOTE — PROGRESS NOTES
"Physical Therapy    Physical Therapy Treatment    Patient Name: Jennifer Terrell  MRN: [id number]  : 1990   Today's Date: 2024  Time Calculation  Start Time: 1030  Stop Time: 1123  Time Calculation (min): 53 min  PT Therapeutic Procedures Time Entry  Therapeutic Exercise Time Entry: 53           Visit Number: 5  Auth: 24 V APPROVED [facility] COM PLAN; 24 THRU 24  [place] [id number]( [id number], [id number], [id number] [phone] [id number] APPROVED)     Current Problem  Problem List Items Addressed This Visit             ICD-10-CM    Chronic low back pain - Primary M54.50, G89.29        Subjective   Patient reports that she woke up yesterday morning with awful pain in her back that was so bad it was hard to breathe.   Today it is better than yesterday, but it is still pretty bad. She can not recall anything that made things a lot worse. She also notes that the exercises at home did not help reduce her pain.     Precautions  Precautions  STEADI Fall Risk Score (The score of 4 or more indicates an increased risk of falling): No new falls reported    Pain  Pain Assessment: 0-10  Pain Score: 8  Pain Type: Chronic pain  Pain Location: Back  Pain Orientation: Lower    Objective   Very slow transitions, difficulty standing more than 30 min, sitting 1.5 hours, lying 3-4 hours on softer surface (unable to tolerate hard flat surfaces).     Treatments:  EXERCISES      Date 24         VISIT# #3 #4 #5    REPS REPS REPS         Nustep L2 x 10 min L2 x 10 min L2 x 10'         Shuttle   DLP 4B 2 x 10 4B 2 x 10 4B 2 x 10   Shuttle   SLP        2B 2 x 10  2B 2 x 10  2B 2 x 10   Shuttle   TR/HR                  Tband   Lat Pulls Green 2 x 10 Green 2 x 10 Gr 2 x 10   Tband   Chops Green 2 x 10 Green 2 x 10 Gr 2 x 10   Tband   Mid Row Green 2 x 10 Green 2 x 10 Gr 2 x 10   Tband   Mower starts            SB  DKTC 3\" 2 x 10 3\" 2 x 10    SB   LTR 5\" x 10 each 5\" x 10 each          Ball Roll Out   Red x 10   Rollout Diagonal   Red x 10 ea " "              PPT      PPT with hip add/abd      PPT with marches                  Stretches:      Calf stretch  3 x 30\" 3 x 30\" 20\" x 3 ea   Piriformis stretch    20\" x 3 ea   Hamstring (on step)   30\" x 3 ea         HEP   Progressed     Access Code: [email or web address]  URL: [email or web address]  Date: 02/12/2024  Prepared by: Andrea Meneses    Exercises  - Seated 3 Way Exercise Ball Roll Out Stretch  - 2 x daily - 1 sets - 10 reps    Assessment:   Patient reported 6/10 at the end of the session. Received new HEP well.    Plan:  Assess response to new exercises and HEP.     OP PT Plan  Treatment/Interventions: Education/ Instruction, Cryotherapy, Therapeutic activities, Therapeutic exercises, Manual therapy, Neuromuscular re-education  PT Plan: Skilled PT  PT Frequency: 2 times per week  Duration: 4-6 weeks  Onset Date: 01/08/24 (2 years of pain. 1-8-24 fell in shower and pain)  Certification Period Start Date: 01/25/24  Certification Period End Date: 04/25/24  Number of Treatments Authorized: auth needed  Rehab Potential: Good  Plan of Care Agreement: Patient    Goals:  Active       PT Problem       PT Goal        Start:  01/25/24    Expected End:  02/15/24         1 Patient to be independent with home exercises within pain free range to stretch Lower extremities and to strengthen core abdominals and core stabilization to increase mobility  2 Patient to demonstrate understanding of what it means to have neutral posture alignment, the use of positioning strategies and body mechanics principles to reduce pain with everyday activities.  3 Patient to gain the functional range of motion necessary in the  lumbar spine to perform activities of daily living with increased ease.    Long term goals ( 4 week)  1 Patient to have reduced pain by 50% or more for increased function and mobility  2 Patient to demonstrate 1/3 MMT strength gain or more in targeted muscle groups and core strength for increased " overall mobility  3 Patient to demonstrate a 4 point or more change in Oswestry to indicate reduced impairment with every day living   4 Patient to return to community mobility and household chores and job related activity with pain levels managed using increased awareness of engaging core stabilization and strategies utilized to manage pain.            PT Goal        Start:  01/25/24    Expected End:  03/07/24       Long term goals   1 Patient to have reduced pain by 50% or more for increased function and mobility  2 Patient to demonstrate 1/3 MMT strength gain or more in targeted muscle groups and core strength for increased overall mobility  3 Patient to demonstrate a 4 point or more change in Oswestry to indicate reduced impairment with every day living   4 Patient to return to community mobility and household chores and job related activity with pain levels managed using increased awareness of engaging core stabilization and strategies utilized to manage pain.

## 2024-02-16 ENCOUNTER — TREATMENT (OUTPATIENT)
Dept: PHYSICAL THERAPY | Facility: HOSPITAL | Age: 34
End: 2024-02-16
Payer: MEDICAID

## 2024-02-16 DIAGNOSIS — G89.29 CHRONIC LEFT-SIDED LOW BACK PAIN WITH LEFT-SIDED SCIATICA: Primary | ICD-10-CM

## 2024-02-16 DIAGNOSIS — M54.42 CHRONIC LEFT-SIDED LOW BACK PAIN WITH LEFT-SIDED SCIATICA: Primary | ICD-10-CM

## 2024-02-16 PROCEDURE — 97110 THERAPEUTIC EXERCISES: CPT | Mod: GP | Performed by: PHYSICAL THERAPIST

## 2024-02-16 ASSESSMENT — PAIN - FUNCTIONAL ASSESSMENT: PAIN_FUNCTIONAL_ASSESSMENT: 0-10

## 2024-02-16 ASSESSMENT — PAIN SCALES - GENERAL: PAINLEVEL_OUTOF10: 5 - MODERATE PAIN

## 2024-02-16 NOTE — PROGRESS NOTES
"Physical Therapy    Physical Therapy Treatment    Patient Name: Jennifer Terrell  MRN: [id number]  : 1990   Today's Date: 2024  Time Calculation  Start Time: 1505  Stop Time: 1555  Time Calculation (min): 50 min  PT Therapeutic Procedures Time Entry  Therapeutic Exercise Time Entry: 45  Non-Billable Time  Non-billable time: 5  Non-billable time reason: Overlap with another patient  Visit Number: 6  Auth: 24 V APPROVED [facility] COM PLAN; 24 THRU 24 [place] [id number]( [id number], [id number], [id number], [id number] [phone] [id number] APPROVED)     Current Problem  Problem List Items Addressed This Visit             ICD-10-CM    Chronic low back pain - Primary M54.50, G89.29        Subjective   Patient reports the severe back pain has resolved to her \"normal\" pain.      Precautions  Precautions  STEADI Fall Risk Score (The score of 4 or more indicates an increased risk of falling): No falls reported    Pain  Pain Assessment: 0-10  Pain Score: 5 - Moderate pain  Pain Type: Chronic pain  Pain Location: Back  Pain Orientation: Lower, Left    Objective   Requires infrequent verbal cues for posture and exercise techniques.     Treatments:  EXERCISES      Date 24         VISIT# #4 #5 #6    REPS REPS REPS         Nustep L2 x 10 min L2 x 10' L3 x 10'         Shuttle   DLP 4B 2 x 10 4B 2 x 10 5B 2x10   Shuttle   SLP        2B 2 x 10  2B 2 x 10 2B 2x10 ea   Shuttle   TR/HR                  Tband   Lat Pulls Green 2 x 10 Gr 2 x 10 Gr 2x10   Tband   Chops Green 2 x 10 Gr 2 x 10 Gr 2x10   Tband   Mid Row Green 2 x 10 Gr 2 x 10 Gr 2x10   Tband   Mower starts   Gr 2x10 ea         SB  DKTC 3\" 2 x 10     SB   LTR 5\" x 10 each           Ball Roll Out  Red x 10 HEP   Rollout Diagonal  Red x 10 ea HEP               Neutral Spine Core Engaged - seated   3\" x 10   Seated hip add/abd   3\" x 10 ea   Core with marches                  Stretches:      Calf stretch  3 x 30\" 20\" x 3 ea 30\" x 5   Piriformis stretch   20\" x 3 ea 30\" x 3 ea " "  Hamstring (on step)  30\" x 3 ea 30\" x 3 ea         HEP  Progressed Progressed     Access Code: [email or web address]  URL: [email or web address]  Date: 02/16/2024  Prepared by: Andrea Meneses    Exercises  - Seated Transversus Abdominis Bracing  - 1 x daily - 10 reps - 5 sec hold  - Seated Hip Adduction Squeeze with Ball  - 1 x daily - 10 reps - 5 sec hold  - Seated Isometric Hip Abduction with Belt  - 1 x daily - 10 reps - 5 sec hold    Assessment:   Patient did very well with progression of exercises and HEP today. Patient noted that \"I feel great\" on the way out of the session today.    Plan:  Continue with POC to improve core stability, flexibility, and quality of life with reduced pain.    OP PT Plan  Treatment/Interventions: Education/ Instruction, Cryotherapy, Therapeutic activities, Therapeutic exercises, Manual therapy, Neuromuscular re-education  PT Plan: Skilled PT  PT Frequency: 2 times per week  Duration: 4-6 weeks  Onset Date: 01/08/24 (2 years of pain. 1-8-24 fell in shower and pain)  Certification Period Start Date: 01/25/24  Certification Period End Date: 04/25/24  Number of Treatments Authorized: auth needed  Rehab Potential: Good  Plan of Care Agreement: Patient    Goals:  Active       PT Problem       PT Goal        Start:  01/25/24    Expected End:  03/07/24       Long term goals   1 Patient to have reduced pain by 50% or more for increased function and mobility - improving  2 Patient to demonstrate 1/3 MMT strength gain or more in targeted muscle groups and core strength for increased overall mobility - improving  3 Patient to demonstrate a 4 point or more change in Oswestry to indicate reduced impairment with every day living   4 Patient to return to community mobility and household chores and job related activity with pain levels managed using increased awareness of engaging core stabilization and strategies utilized to manage pain. - improving                 "

## 2024-02-20 ENCOUNTER — TELEPHONE (OUTPATIENT)
Dept: PRIMARY CARE | Facility: CLINIC | Age: 34
End: 2024-02-20
Payer: MEDICAID

## 2024-02-20 DIAGNOSIS — F64.9 GENDER DYSPHORIA: ICD-10-CM

## 2024-02-20 NOTE — TELEPHONE ENCOUNTER
Pt called in and is requesting a referral to a surgeon for bottom surgery and a letter for pt's insurance so it can be covered. Pt is wanting to know if she can go to [facility] for this or if they would have to go through , please advise.

## 2024-02-21 ENCOUNTER — LAB (OUTPATIENT)
Dept: LAB | Facility: LAB | Age: 34
End: 2024-02-21
Payer: MEDICAID

## 2024-02-21 DIAGNOSIS — F64.9 GENDER DYSPHORIA: ICD-10-CM

## 2024-02-21 DIAGNOSIS — F64.0 GENDER DYSPHORIA IN ADULT: Primary | ICD-10-CM

## 2024-02-21 LAB — ESTRADIOL SERPL-MCNC: 65 PG/ML

## 2024-02-21 PROCEDURE — 82679 ASSAY OF ESTRONE: CPT

## 2024-02-21 PROCEDURE — 84402 ASSAY OF FREE TESTOSTERONE: CPT

## 2024-02-21 PROCEDURE — 36415 COLL VENOUS BLD VENIPUNCTURE: CPT

## 2024-02-21 PROCEDURE — 82670 ASSAY OF TOTAL ESTRADIOL: CPT

## 2024-02-21 NOTE — TELEPHONE ENCOUNTER
I can put in a referral to whomever she would like for bottom surgery including [facility]. I just need to know who she would like the referral to go to.   She will need a letter from 2 separate mental health providers for her insurance and the surgeon to consider performing the surgery.   They usually do not require a letter from PCP for this.   Thanks,  Minda Veliz, DO

## 2024-02-22 ENCOUNTER — TREATMENT (OUTPATIENT)
Dept: PHYSICAL THERAPY | Facility: HOSPITAL | Age: 34
End: 2024-02-22
Payer: MEDICAID

## 2024-02-22 DIAGNOSIS — M54.42 CHRONIC LEFT-SIDED LOW BACK PAIN WITH LEFT-SIDED SCIATICA: ICD-10-CM

## 2024-02-22 DIAGNOSIS — G89.29 CHRONIC LEFT-SIDED LOW BACK PAIN WITH LEFT-SIDED SCIATICA: ICD-10-CM

## 2024-02-22 PROCEDURE — 97110 THERAPEUTIC EXERCISES: CPT | Mod: GP | Performed by: PHYSICAL THERAPIST

## 2024-02-22 ASSESSMENT — PAIN - FUNCTIONAL ASSESSMENT: PAIN_FUNCTIONAL_ASSESSMENT: 0-10

## 2024-02-22 ASSESSMENT — PAIN SCALES - GENERAL: PAINLEVEL_OUTOF10: 3

## 2024-02-22 NOTE — PROGRESS NOTES
"Physical Therapy    Physical Therapy    Physical Therapy Treatment      Patient Name: Jennifer Terrell  MRN: [id number]  Today's Date: 2/22/2024  Visit # 7  Auth: 24 V APPROVED [facility] COM PLAN; 1/25/24 THRU 4/25/24 - NO [id number]( [id number] [phone] [id number] APPROVED)    Time Calculation  Start Time: 0345  Stop Time: 0430  Time Calculation (min): 45 min           Subjective  3/10 , good so far today but has not done much activty  Overall feels making progress       Precautions  Precautions  STEADI Fall Risk Score (The score of 4 or more indicates an increased risk of falling): no falls reported    Current Problem:   1. Chronic left-sided low back pain with left-sided sciatica  Follow Up In Physical Therapy          Pain:  Pain Assessment  Pain Assessment: 0-10  Pain Score: 3 (LBP fluctuates 2-3/10 today, can go to 7/10)  Pain Type: Chronic pain  Pain Location: Back  Pain Orientation: Lower      Objective     Progressed HEP , provided pt with tband for home        Treatments:  Requires infrequent verbal cues for posture and exercise techniques.  Treatments:  EXERCISES          Date 2/7/24 2/12/2024 2/16/2024 2/22/2024              VISIT# #4 #5 #6 #7     REPS REPS REPS               Nustep L2 x 10 min L2 x 10' L3 x 10' L3 @10 min              Shuttle   DLP 4B 2 x 10 4B 2 x 10 5B 2x10 5B  2x 10    Shuttle   SLP        2B 2 x 10  2B 2 x 10 2B 2x10 ea 3B 10 x 2   Shuttle   TR/HR       3B  10 x 2                          Tband   Lat Pulls Green 2 x 10 Gr 2 x 10 Gr 2x10 GR 2 x 10   Tband   Chops Green 2 x 10 Gr 2 x 10 Gr 2x10 GR 2 x 10   Tband   Mid Row Green 2 x 10 Gr 2 x 10 Gr 2x10 GR 2 x 10   Tband   Mower starts     Gr 2x10 ea Gr 2 x 10              SB  DKTC 3\" 2 x 10     10 x 2   SB   LTR 5\" x 10 each                   Ball Roll Out   Red x 10 HEP    Rollout Diagonal   Red x 10 ea HEP                          Neutral Spine Core Engaged - seated     3\" x 10 10 x 3'   Seated hip add/abd     3\" x 10 ea home   Core with " "marches                                Stretches:          Calf stretch  3 x 30\" 20\" x 3 ea 30\" x 5 30' x 3   Piriformis stretch    20\" x 3 ea 30\" x 3 ea 30''x 3   Hamstring (on step)   30\" x 3 ea 30\" x 3 ea 30' x 3 seated              HEP   Progressed Progressed Prgressed tband provided      Access Code: [email or web address]  URL: [email or web address]  Date: 02/22/2024  Prepared by: Tina Nunez    Exercises  - Seated Hamstring Stretch  - 1 x daily - 7 x weekly - 2 reps - 30 hold  - Seated Transversus Abdominis Bracing  - 1 x daily - 10 reps - 5 sec hold  - Seated Hip Adduction Squeeze with Ball  - 1 x daily - 10 reps - 5 sec hold  - Seated Isometric Hip Abduction with Belt  - 1 x daily - 10 reps - 5 sec hold  - Standing Shoulder Row with Anchored Resistance  - 1 x daily - 7 x weekly - 2 sets - 10 reps  - Shoulder Extension with Resistance  - 1 x daily - 7 x weekly - 2 sets - 10 reps  - Supine Posterior Pelvic Tilt  - 1 x daily - 7 x weekly - 10 reps - 5 hold     Assessment: pain is reducing and able to tolerate increase activity     Plan: continue core strengthening    Goals:  Active       PT Problem       PT Goal        Start:  01/25/24    Expected End:  03/07/24       Long term goals   1 Patient to have reduced pain by 50% or more for increased function and mobility - improving  2 Patient to demonstrate 1/3 MMT strength gain or more in targeted muscle groups and core strength for increased overall mobility - improving  3 Patient to demonstrate a 4 point or more change in Oswestry to indicate reduced impairment with every day living   4 Patient to return to community mobility and household chores and job related activity with pain levels managed using increased awareness of engaging core stabilization and strategies utilized to manage pain. - improving                "

## 2024-02-23 RX ORDER — ESTRADIOL 1.25 MG/1.25G
2.5 GEL TOPICAL DAILY
Qty: 75 G | Refills: 11 | Status: SHIPPED | OUTPATIENT
Start: 2024-02-23 | End: 2025-02-22

## 2024-02-24 LAB — ESTRONE SERPL-MCNC: 46.5 PG/ML (ref 9–36)

## 2024-02-25 LAB
TESTOSTERONE FREE (CHAN): 74.9 PG/ML (ref 35–155)
TESTOSTERONE,TOTAL,LC-MS/MS: 261 NG/DL (ref 250–1100)

## 2024-02-27 ENCOUNTER — TREATMENT (OUTPATIENT)
Dept: PHYSICAL THERAPY | Facility: HOSPITAL | Age: 34
End: 2024-02-27
Payer: MEDICAID

## 2024-02-27 DIAGNOSIS — G89.29 CHRONIC LEFT-SIDED LOW BACK PAIN WITH LEFT-SIDED SCIATICA: ICD-10-CM

## 2024-02-27 DIAGNOSIS — M54.42 CHRONIC LEFT-SIDED LOW BACK PAIN WITH LEFT-SIDED SCIATICA: ICD-10-CM

## 2024-02-27 PROCEDURE — 97110 THERAPEUTIC EXERCISES: CPT | Mod: GP,CQ | Performed by: PHYSICAL THERAPY ASSISTANT

## 2024-02-27 ASSESSMENT — PAIN SCALES - GENERAL: PAINLEVEL_OUTOF10: 4

## 2024-02-27 ASSESSMENT — PAIN - FUNCTIONAL ASSESSMENT: PAIN_FUNCTIONAL_ASSESSMENT: 0-10

## 2024-02-27 NOTE — PROGRESS NOTES
"Physical Therapy    Physical Therapy Treatment    Patient Name: Jennifer Terrell  MRN: [id number]  Today's Date: 2/27/2024  Time Calculation  Start Time: 0830  Stop Time: 0917  Time Calculation (min): 47 minAuth: 24 V APPROVED [facility] COM PLAN; 1/25/24 THRU 4/25/24 - NO 45709( [id number], [id number] [phone] [id number] APPROVED)      PT Therapeutic Procedures Time Entry  Therapeutic Exercise Time Entry: 47        VISIT# 8    Current Problem  1. Chronic left-sided low back pain with left-sided sciatica  Follow Up In Physical Therapy          Subjective      Pt reports back pain increased due to using computer with poor posture. States cannot modify environment. Reports trying to figure out where to place tband at home for HEP.   Precautions  Precautions  STEADI Fall Risk Score (The score of 4 or more indicates an increased risk of falling): No falls reported       Pain  Pain Assessment: 0-10  Pain Score: 4 (Can reach 7/10)  Pain Type: Chronic pain  Pain Location: Back  Pain Orientation: Lower       Objective     Increased resistance on shuttle  Increased reps with Tband vs resistance  Treatments:  EXERCISES           Date 2/7/24 2/12/2024 2/16/2024 2/22/2024 2/27/24               VISIT# #4 #5 #6 #7 #8               Nustep L2 x 10 min L2 x 10' L3 x 10' L3 @10 min L3 x 10'               Shuttle   DLP 4B 2 x 10 4B 2 x 10 5B 2x10 5B  2x 10  5B 3 x 10   Shuttle   SLP        2B 2 x 10  2B 2 x 10 2B 2x10 ea 3B 10 x 2 4B 2 x 10   Shuttle   TR/HR       3B  10 x 2  4B 2 x 10                           Tband   Lat Pulls Green 2 x 10 Gr 2 x 10 Gr 2x10 GR 2 x 10 GR 3 x 10   Tband   Chops Green 2 x 10 Gr 2 x 10 Gr 2x10 GR 2 x 10 Gr 3 x 10   Tband   Mid Row Green 2 x 10 Gr 2 x 10 Gr 2x10 GR 2 x 10 GR 3 x 10   Tband   Mower starts     Gr 2x10 ea Gr 2 x 10 GR 3 x 10               SB  DKTC 3\" 2 x 10     10 x 2 10 x 2   SB   LTR 5\" x 10 each                     Ball Roll Out   Red x 10 HEP     Rollout Diagonal   Red x 10 ea HEP                         " "    Neutral Spine Core Engaged - seated     3\" x 10 10 x 3'    Seated hip add/abd     3\" x 10 ea home    Core with marches                                   Stretches:           Calf stretch  3 x 30\" 20\" x 3 ea 30\" x 5 30' x 3 30\" x 3   Piriformis stretch    20\" x 3 ea 30\" x 3 ea 30''x 3    Hamstring (on step)   30\" x 3 ea 30\" x 3 ea 30' x 3 seated 30\" x 3 each  On step               HEP   Progressed Progressed Prgressed tband provided           Assessment:   Pt reports no back pain post session and able to tolerate increase resistance/reps as noted.     OP EDUCATION:   Reviewed and discussed modifications to prevent elbow from \"locking.\"    Plan:   Continue core strengthening    Goals:  Active       PT Problem       PT Goal        Start:  01/25/24    Expected End:  03/07/24       Long term goals   1 Patient to have reduced pain by 50% or more for increased function and mobility - improving  2 Patient to demonstrate 1/3 MMT strength gain or more in targeted muscle groups and core strength for increased overall mobility - improving  3 Patient to demonstrate a 4 point or more change in Oswestry to indicate reduced impairment with every day living   4 Patient to return to community mobility and household chores and job related activity with pain levels managed using increased awareness of engaging core stabilization and strategies utilized to manage pain. - improving                 "

## 2024-03-01 ENCOUNTER — APPOINTMENT (OUTPATIENT)
Dept: PHYSICAL THERAPY | Facility: HOSPITAL | Age: 34
End: 2024-03-01
Payer: MEDICAID

## 2024-03-05 ENCOUNTER — TREATMENT (OUTPATIENT)
Dept: PHYSICAL THERAPY | Facility: HOSPITAL | Age: 34
End: 2024-03-05
Payer: MEDICAID

## 2024-03-05 ENCOUNTER — APPOINTMENT (OUTPATIENT)
Dept: PAIN MEDICINE | Facility: HOSPITAL | Age: 34
End: 2024-03-05
Payer: MEDICAID

## 2024-03-05 DIAGNOSIS — G89.29 CHRONIC LEFT-SIDED LOW BACK PAIN WITH LEFT-SIDED SCIATICA: ICD-10-CM

## 2024-03-05 DIAGNOSIS — M54.42 CHRONIC LEFT-SIDED LOW BACK PAIN WITH LEFT-SIDED SCIATICA: ICD-10-CM

## 2024-03-05 PROCEDURE — 97110 THERAPEUTIC EXERCISES: CPT | Mod: GP | Performed by: PHYSICAL THERAPIST

## 2024-03-05 NOTE — PROGRESS NOTES
Physical Therapy    Physical Therapy    Physical Therapy Treatment and Discharge     Patient Name: Jennifer Terrell  MRN: [id number]  Today's Date: 3/5/2024  Visit # 9  24 V APPROVED [facility] COM PLAN; 1/25/24 THRU 4/25/24 - NO 13211( [id number], [id number] [phone] [id number] APPROVED)     Discharge 3-5-24  Total visits #9    Time Calculation  Start Time: 0945  Stop Time: 1043  Time Calculation (min): 58 min      Subjective     Pt 3-4/10 back pain. No leg tingling.        Current Problem:   1. Chronic left-sided low back pain with left-sided sciatica  Follow Up In Physical Therapy          Pain:  Pain Assessment  Pain Type: Chronic pain  Pain Location: Back  Pain Orientation: Lower      Objective          Since therapy pain has reduced to 3-4 /10 ( no longer 6-7/10)    No longer having Leg tingling bilateral   turning in bed Ok now  Laying is Ok now  Sitting as long as want with back support  Standing 30 min  Enjoys power and games  Getting more active  Chiropractic 1 x month since 2022    Trunk AROM  Flex to reach shin  Side bend WNL     SKC  WNL  Figure 4  Rt tight / but improved , left WNL. Independent with HEP   HS 65 michael ( improved from 50)     MMT  Hip flex 5/5    knee flex 5/5   knee ex 5/5  Ankle DF 5/5  Core 4/5        Outcome Measures:  LEFS 58 improved from 20      Treatments:  EXERCISES        Date 2/22/2024 2/27/24 3/5/24            VISIT# #7 #8 #9            Nustep L3 @10 min L3 x 10' L3 x 10 min            Shuttle   DLP 5B  2x 10  5B 3 x 10 5B 10 x 3   Shuttle   SLP        3B 10 x 2 4B 2 x 10 4B 10 x 2   Shuttle   TR/HR 3B  10 x 2  4B 2 x 10 4B  2 x 10                     Tband   Lat Pulls GR 2 x 10 GR 3 x 10 GR 3x 10   Tband   Chops GR 2 x 10 Gr 3 x 10 Gr 3x 10Gr 10 x 3   Tband   Mid Row GR 2 x 10 GR 3 x 10 GR 10 x 3   Tband   Mower starts Gr 2 x 10 GR 3 x 10 GR 3 X10            SB  DKTC 10 x 2 10 x 2    SB   LTR        Ball Roll Out        Rollout Diagonal                          Neutral Spine Core Engaged -  "seated 10 x 3'      Seated hip add/abd home      Core with marches                          Stretches:        Calf stretch  30' x 3 30\" x 3 30'x 3   Piriformis stretch  30''x 3   30' x3    Hamstring (on step) 30' x 3 seated 30\" x 3 each  On step 30'x 3            HEP Prgressed tband provided              Assessment:  Recheck. Met goals. Independent with home ex and feels ready to manage on own with ex     Plan: Discharge PT    Goals: Met goals  Resolved       PT Problem       PT Goal  (Met)       Start:  01/25/24    Expected End:  03/07/24    Resolved:  03/05/24    Long term goals   1 Patient to have reduced pain by 50% or more for increased function and mobility - improving  2 Patient to demonstrate 1/3 MMT strength gain or more in targeted muscle groups and core strength for increased overall mobility - improving  3 Patient to demonstrate a 4 point or more change in Oswestry to indicate reduced impairment with every day living   4 Patient to return to community mobility and household chores and job related activity with pain levels managed using increased awareness of engaging core stabilization and strategies utilized to manage pain. - improving                "

## 2024-03-06 ENCOUNTER — OFFICE VISIT (OUTPATIENT)
Dept: PAIN MEDICINE | Facility: HOSPITAL | Age: 34
End: 2024-03-06
Payer: MEDICAID

## 2024-03-06 VITALS
RESPIRATION RATE: 16 BRPM | HEIGHT: 72 IN | DIASTOLIC BLOOD PRESSURE: 80 MMHG | WEIGHT: 269.6 LBS | HEART RATE: 59 BPM | BODY MASS INDEX: 36.52 KG/M2 | SYSTOLIC BLOOD PRESSURE: 124 MMHG

## 2024-03-06 DIAGNOSIS — M54.42 CHRONIC LEFT-SIDED LOW BACK PAIN WITH LEFT-SIDED SCIATICA: ICD-10-CM

## 2024-03-06 DIAGNOSIS — M62.838 MUSCLE SPASM: ICD-10-CM

## 2024-03-06 DIAGNOSIS — G89.29 CHRONIC LEFT-SIDED LOW BACK PAIN WITH LEFT-SIDED SCIATICA: ICD-10-CM

## 2024-03-06 DIAGNOSIS — M54.16 LUMBAR RADICULAR PAIN: ICD-10-CM

## 2024-03-06 DIAGNOSIS — M51.36 DDD (DEGENERATIVE DISC DISEASE), LUMBAR: Primary | ICD-10-CM

## 2024-03-06 PROCEDURE — 1036F TOBACCO NON-USER: CPT | Performed by: CLINICAL NURSE SPECIALIST

## 2024-03-06 PROCEDURE — 99214 OFFICE O/P EST MOD 30 MIN: CPT | Performed by: CLINICAL NURSE SPECIALIST

## 2024-03-06 RX ORDER — LIDOCAINE 50 MG/G
OINTMENT TOPICAL AS NEEDED
Qty: 50 G | Refills: 2 | Status: SHIPPED | OUTPATIENT
Start: 2024-03-06 | End: 2024-04-05

## 2024-03-06 ASSESSMENT — PATIENT HEALTH QUESTIONNAIRE - PHQ9
SUM OF ALL RESPONSES TO PHQ9 QUESTIONS 1 AND 2: 0
2. FEELING DOWN, DEPRESSED OR HOPELESS: NOT AT ALL
1. LITTLE INTEREST OR PLEASURE IN DOING THINGS: NOT AT ALL

## 2024-03-06 ASSESSMENT — ENCOUNTER SYMPTOMS
LOSS OF SENSATION IN FEET: 0
DEPRESSION: 0
OCCASIONAL FEELINGS OF UNSTEADINESS: 0

## 2024-03-06 ASSESSMENT — COLUMBIA-SUICIDE SEVERITY RATING SCALE - C-SSRS
2. HAVE YOU ACTUALLY HAD ANY THOUGHTS OF KILLING YOURSELF?: NO
6. HAVE YOU EVER DONE ANYTHING, STARTED TO DO ANYTHING, OR PREPARED TO DO ANYTHING TO END YOUR LIFE?: NO
1. IN THE PAST MONTH, HAVE YOU WISHED YOU WERE DEAD OR WISHED YOU COULD GO TO SLEEP AND NOT WAKE UP?: NO

## 2024-03-06 NOTE — PROGRESS NOTES
Subjective   Patient ID: Jennifer Terrell is a 33 y.o. adult who presents for low back pain   HPI    33-year-old transgender adult with history of epilepsy, mood disorder and chronic low back pain presents for follow-up.  Patient has been experiencing low back pain for approximately 1 year.  Previously pain was radiating to bilateral thighs and lower extremities.  Patient was sent for a formal course of physical therapy at her last office visit.  Added tizanidine for muscle tightness and spasm.  Trialed multiple NSAIDs and cyclobenzaprine without relief.  Previous lumbar x-rays negative.  Patient presents today for follow-up.  Continues to experience low back pain which at today's office visit is nonradiating.  She denies any radicular symptoms in her lower extremity.  She states her pain is no longer sharp and stabbing.  She feels that her pain is aching and throbbing.  Denies numbness/tingling or weakness to lower extremities.  Patient has noted significant improvement in low back pain since starting physical therapy.  Completed approximately 6 to 8 weeks of therapy and is continuing home therapy exercises.  Tizanidine has been helpful for muscle tightness and spasms using most often 2 times per day.  Patient has noted being able to stand longer and is able to do dishes with less pain.  Pain well-controlled during the day and more intense at bedtime secondary to muscle tightness and spasms.  Patient continues to be followed by chiropractor which has been helpful.      Pt is here for interval follow up. Pt has low back pain that radiates across the low back.    PT completed- felt this helped    3/10    Other medications: Tizanidine- feels this is helpful    Injections: denies    OA 1/12/24  OARRS:  No data recorded  I have personally reviewed the OARRS report for Jennifer Terrell. I have considered the risks of abuse, dependence, addiction and diversion    Is the patient prescribed a combination of a benzodiazepine  and opioid?  No    Last Urine Drug Screen / ordered today: No  No results found for this or any previous visit (from the past 8760 hour(s)).  N/A    Controlled Substance Agreement:  Date of the Last Agreement:       Monitoring and compliance:    ORT: NA    PDUQ: NA    Office Agreement:      Review of Systems    ROS:   General: No fevers, chills, weight loss  Skin: Negative for lesions  Eyes: No acute vision changes  Ears: No vertigo  Nose, mouth, throat: No difficulty swallowing or speaking  Respiratory: No cough, shortness of breath, cyanosis  Cardiovascular: Negative for chest pain syncope or palpitation  Gastrointestinal: No constipation, nausea, vomiting  Neurological: Negative for headache,   Psychological: Negative for severe or debilitating anxiety, depression. Negative memory loss  Musculoskeletal: Positive for Rios, myalgia, pain and spasm  Endocrine: Negative for weight gain, appetite changes, excessive sweating  Allergy/immune: Negative    All 13 systems were reviewed and are within normal levels except as noted or in the history of present illness.  Positive or pertinent negative responses are noted or were in the history of present illness. As noted, the patient denies significant or impairing weakness in the bilateral upper and lower extremities, medication induced constipation, and bowel or bladder incontinence.     Current Outpatient Medications:     ARIPiprazole (Abilify) 20 mg tablet, Take 1 tablet (20 mg) by mouth once daily., Disp: , Rfl:     bicalutamide (Casodex) 50 mg tablet, Take 1 tablet (50 mg total) by mouth once daily., Disp: 90 tablet, Rfl: 3    cetirizine (ZyrTEC) 10 mg tablet, Take 1 tablet (10 mg) by mouth once daily. Allergy medication, Disp: 90 tablet, Rfl: 3    estradioL 1.25 mg/1.25 gram (0.1 %) gel in packet, Place 2.5 mg on the skin once daily. (2 packets), Disp: 75 g, Rfl: 11    famotidine (Pepcid) 40 mg tablet, Take 1 tablet (40 mg) by mouth 2 times a day., Disp: 60 tablet,  Rfl: 5    lamoTRIgine (LaMICtal) 200 mg tablet, Take 1 tablet (200 mg) by mouth once daily., Disp: , Rfl:     levETIRAcetam XR (Keppra XR) 750 mg tablet extended release 24 hr 24 hr tablet, Take 2 tablets (1,500 mg) by mouth 2 times a day., Disp: , Rfl:     multivitamin with minerals (multivitamin) tablet, Take 1 tablet by mouth once daily., Disp: , Rfl:     progesterone (Prometrium) 200 mg capsule, Take 1 capsule (200 mg) by mouth once daily., Disp: 90 capsule, Rfl: 3    tiZANidine (Zanaflex) 2 mg tablet, Take 1 tablet (2 mg) by mouth every 8 hours if needed for muscle spasms., Disp: 90 tablet, Rfl: 2    traZODone (Desyrel) 100 mg tablet, Take 1 tablet (100 mg) by mouth once daily at bedtime., Disp: , Rfl:     zonisamide (Zonegran) 100 mg capsule, Take 2 capsules (200 mg) by mouth once daily at bedtime., Disp: , Rfl:     lidocaine (Xylocaine) 5 % ointment, Apply topically if needed for mild pain (1 - 3)., Disp: 50 g, Rfl: 2     Past Medical History:   Diagnosis Date    ADHD (attention deficit hyperactivity disorder)     Anxiety     Chondromalacia patellae, right knee 10/24/2019    Chondromalacia of right patella    Chronic low back pain 04/13/2023    Depression     Dorsalgia, unspecified 06/24/2020    Acute back pain    Flushing 01/06/2021    Flushing    Generalized abdominal pain 11/02/2021    Abdominal pain, generalized    Local infection of the skin and subcutaneous tissue, unspecified 10/06/2020    Skin infection, bacterial    Nausea 03/10/2021    Nausea in adult    Other forms of dyspnea 03/08/2018    Dyspnea on exertion    Other hemorrhoids 07/15/2021    Internal hemorrhoids    Other skin changes 07/08/2021    Skin irritation    Otitis media, unspecified, left ear 05/01/2020    Acute otitis media, left    Personal history of COVID-19     Personal history of COVID-19    Personal history of diseases of the skin and subcutaneous tissue 03/10/2021    History of dermatitis    Personal history of other diseases of  the respiratory system 10/06/2021    History of sore throat    Personal history of other diseases of the respiratory system 10/06/2021    History of laryngitis    Personal history of other specified conditions 08/17/2021    History of dysphagia    Personal history of other specified conditions 05/03/2018    History of palpitations    Personal history of other specified conditions 05/03/2018    History of exertional chest pain    Personal history of other specified conditions 05/03/2018    History of syncope    Seasonal asthma         Past Surgical History:   Procedure Laterality Date    INNER EAR SURGERY  03/08/2018    Inner Ear Surgery    OTHER SURGICAL HISTORY  04/05/2022    Oral surgery    OTHER SURGICAL HISTORY  08/06/2021    Esophagogastroduodenoscopy    OTHER SURGICAL HISTORY  08/06/2021    Colonoscopy    TONSILLECTOMY  03/08/2018    Tonsillectomy        Family History   Problem Relation Name Age of Onset    Other (cva) Other      Epilepsy Other      Ulcerative colitis Other      Hypertension Other          Allergies   Allergen Reactions    Cinnamon Anaphylaxis     Artificial cinnamon flavored products    Penicillin V Potassium Anaphylaxis    Penicillins Anaphylaxis, Other, Swelling and Unknown     throat swelling, SOB    Petroleum Jelly [White Petrolatum] Hives     Cold, burning sensation and welts    Petrolatum,White Rash        Objective     Visit Vitals  /80   Pulse 59   Resp 16 Comment: 02 99   Ht 1.829 m (6')   Wt 122 kg (269 lb 9.6 oz)   BMI 36.56 kg/m²   Smoking Status Never   BSA 2.49 m²        Physical Exam    PE:  General: Well-developed, well-nourished, no acute distress. The patient demonstrates no pain behavior, symptom magnification or overt drug-seeking behavior.  Eye: Pupils appropriate for room lighting  Neck/thyroid: No obvious goiter or enlargement of neck noted  Respiratory exam: Normal respiratory effort, unlabored respiration. No accessory muscle use noted  Cardiac exam: Bilateral  radial pulses intact  Abdominal: Nondistended  Spine, lumbar: The patient is able to rise from a seated to standing position without hesitancy, push off, or delay. Gait is grossly nonantalgic. Tenderness to paraspinous musculature is noted lower lumbar spine.  Flexion and extension intact.  Negative straight leg raise.  Negative SI joint tenderness.  Neurologic exam: Muscle strength is antigravity in all 4 extremities.  Equal muscle strength bilateral lower extremities.  Psychiatric exam: Judgment and insight normal, affect normal, speech is fluent, affect appropriate, demonstrating no signs of hypersomnolence, sedation, or confusion          Assessment/Plan   Problem List Items Addressed This Visit             ICD-10-CM    Chronic low back pain M54.50, G89.29    Lumbar radicular pain M54.16     33 y.o. transgender adult with history of epilepsy and mood disorder who presents for follow-up of chronic lumbar radicular pain.  Currently pain is located across her low back without radiation to left bilateral hips or lower extremities.  Pain can be increased with standing for prolonged period of time and bending forward. Recent x-rays of lumbar spine without significant findings.  Patient completed a formal course of physical therapy which she felt was helpful and continues to do home therapy exercises and stretches.  She has noted a decrease in radicular symptoms after completing physical therapy.  She feels the addition of tizanidine has been very helpful for muscle tightness and spasm.  Taking most often 2 times per day.  Advised patient that she can take this medication up to 3 times per day if needed.  Exam and symptoms consistent with a myofascial component to her pain as opposed to a neuritis.  Recommend that she continue physical therapy exercises consistently at home.  Added lidocaine ointment for localized/topical pain relief.  Currently patient feels that her symptoms are tolerable.  If symptoms should worsen  we will proceed with lumbar MRI.  Plan reviewed with patient at today's visit.  -Advised patient to continue home physical therapy exercises consistently targeting improving core strength.  -Patient will follow-up with our office as needed.  Advised patient that if lumbar radicular symptoms should worsen to contact our office for reevaluation.  -If lumbar symptoms worsen patient may benefit from further imaging such as lumbar MRI.  -Patient will continue tizanidine 2 mg every 8 hours as needed for muscle tightness and spasm.   -Would not consider the addition of gabapentin or duloxetine secondary to multiple mood stabilizers as well as seizure medications.  -Added lidocaine ointment for localized/topical pain relief          Other Visit Diagnoses         Codes    DDD (degenerative disc disease), lumbar    -  Primary M51.36    Relevant Medications    lidocaine (Xylocaine) 5 % ointment    Muscle spasm     M62.838

## 2024-03-06 NOTE — ASSESSMENT & PLAN NOTE
33 y.o. transgender adult with history of epilepsy and mood disorder who presents for follow-up of chronic lumbar radicular pain.  Currently pain is located across her low back without radiation to left bilateral hips or lower extremities.  Pain can be increased with standing for prolonged period of time and bending forward. Recent x-rays of lumbar spine without significant findings.  Patient completed a formal course of physical therapy which she felt was helpful and continues to do home therapy exercises and stretches.  She has noted a decrease in radicular symptoms after completing physical therapy.  She feels the addition of tizanidine has been very helpful for muscle tightness and spasm.  Taking most often 2 times per day.  Advised patient that she can take this medication up to 3 times per day if needed.  Exam and symptoms consistent with a myofascial component to her pain as opposed to a neuritis.  Recommend that she continue physical therapy exercises consistently at home.  Added lidocaine ointment for localized/topical pain relief.  Currently patient feels that her symptoms are tolerable.  If symptoms should worsen we will proceed with lumbar MRI.  Plan reviewed with patient at today's visit.  -Advised patient to continue home physical therapy exercises consistently targeting improving core strength.  -Patient will follow-up with our office as needed.  Advised patient that if lumbar radicular symptoms should worsen to contact our office for reevaluation.  -If lumbar symptoms worsen patient may benefit from further imaging such as lumbar MRI.  -Patient will continue tizanidine 2 mg every 8 hours as needed for muscle tightness and spasm.   -Would not consider the addition of gabapentin or duloxetine secondary to multiple mood stabilizers as well as seizure medications.  -Added lidocaine ointment for localized/topical pain relief

## 2024-03-07 ENCOUNTER — LAB (OUTPATIENT)
Dept: LAB | Facility: LAB | Age: 34
End: 2024-03-07
Payer: MEDICAID

## 2024-03-07 DIAGNOSIS — F64.0 GENDER DYSPHORIA IN ADULT: ICD-10-CM

## 2024-03-07 LAB — ESTRADIOL SERPL-MCNC: 164 PG/ML

## 2024-03-07 PROCEDURE — 36415 COLL VENOUS BLD VENIPUNCTURE: CPT

## 2024-03-07 PROCEDURE — 82670 ASSAY OF TOTAL ESTRADIOL: CPT

## 2024-03-21 ENCOUNTER — TELEPHONE (OUTPATIENT)
Dept: PRIMARY CARE | Facility: CLINIC | Age: 34
End: 2024-03-21
Payer: MEDICAID

## 2024-03-21 NOTE — TELEPHONE ENCOUNTER
Pharmacy never let us know that PA was needed. I am starting the PA now. Can you let patient know? Thanks,  Minda Veliz, DO

## 2024-03-21 NOTE — TELEPHONE ENCOUNTER
Patient is calling stating a PA is required for this and the pharmacy hasn't rec a response from us. States the dose was increased and now needs a PA.  Patient has been out of this med.    estradioL 1.25 mg/1.25 gram (0.1 %) gel in packet

## 2024-04-23 DIAGNOSIS — R11.2 NAUSEA AND VOMITING, UNSPECIFIED VOMITING TYPE: Primary | ICD-10-CM

## 2024-04-23 RX ORDER — ONDANSETRON 4 MG/1
4 TABLET, ORALLY DISINTEGRATING ORAL EVERY 8 HOURS PRN
Qty: 20 TABLET | Refills: 0 | Status: SHIPPED | OUTPATIENT
Start: 2024-04-23 | End: 2024-04-30

## 2024-04-23 NOTE — PROGRESS NOTES
Presents today during visit with significant other with concerns of acute GI vomiting without emesis. Reports incidence of vomit in throat but without emesis, first noticed today. Endorses fatigue, denies fevers/chills. Feels like sensation of blood on throat. Reports difficulty emptying bowels for the past week. Reports last BM was last night around 2AM. Eating is OK, sometimes with appetite. Denies recent sick contacts.  Taking famotidine 40mg twice daily. Likely viral etiology in origin.   Will prescribe short course of ondansetron for her.   Recommended to present to ED if vomit becomes bloody or develops dark colored stools.    Yocasta BRUNO-CNS

## 2024-05-02 ENCOUNTER — TELEPHONE (OUTPATIENT)
Dept: PRIMARY CARE | Facility: CLINIC | Age: 34
End: 2024-05-02
Payer: MEDICAID

## 2024-05-02 NOTE — TELEPHONE ENCOUNTER
Opportunities for [facility] with disabilities form completed and faxed.   Minda Veliz, DO

## 2024-05-16 RX ORDER — LEVETIRACETAM 750 MG/1
1500 TABLET ORAL EVERY 12 HOURS
COMMUNITY
Start: 2024-03-19

## 2024-05-16 RX ORDER — PRAZOSIN HYDROCHLORIDE 1 MG/1
1 CAPSULE ORAL NIGHTLY
COMMUNITY
Start: 2024-04-22

## 2024-05-16 RX ORDER — LAMOTRIGINE 150 MG/1
1 TABLET ORAL 2 TIMES DAILY
COMMUNITY
Start: 2024-04-10

## 2024-05-16 NOTE — PROGRESS NOTES
Subjective   Patient ID: Jennifer Terrell is a 33 y.o. adult who presents for Annual Exam (Ringing left ear and stabbing pain right side ).  Back pain  Her pain is currently worse because she has been out of tizanidine for some time.     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 She is concerned about tinnitus which occurs intermittently during the day for the past 2 weeks.           Patient Care Team:  Minda Veliz DO as PCP - General  Nadia Orantes as Therapist (Psychology)  DAMION Macdonald-CNP as Nurse Practitioner (Gastroenterology)  Dipti Rosa MD MPH as Consulting Physician (Urology)  DAMION Starks-CNS as Nurse Practitioner (Psychiatry)    Current Outpatient Medications   Medication Sig Dispense Refill    ARIPiprazole (Abilify) 20 mg tablet Take 1 tablet (20 mg) by mouth once daily.      bicalutamide (Casodex) 50 mg tablet Take 1 tablet (50 mg total) by mouth once daily. 90 tablet 3    cetirizine (ZyrTEC) 10 mg tablet Take 1 tablet (10 mg) by mouth once daily. Allergy medication 90 tablet 3    estradioL 1.25 mg/1.25 gram (0.1 %) gel in packet Place 2.5 mg on the skin once daily. (2 packets) 75 g 11    famotidine (Pepcid) 40 mg tablet Take 1 tablet (40 mg) by mouth 2 times a day. 60 tablet 5    lamoTRIgine (LaMICtal) 150 mg tablet Take 1 tablet (150 mg) by mouth 2 times a day.      levETIRAcetam (Keppra) 750 mg tablet Take 2 tablets (1,500 mg) by mouth every 12 hours.      multivitamin with minerals (multivitamin) tablet Take 1 tablet by mouth once daily.      prazosin (Minipress) 1 mg capsule Take 1 capsule (1 mg) by mouth once daily at bedtime. For nightmares      progesterone (Prometrium) 200 mg capsule Take 1 capsule (200 mg) by mouth once daily. 90 capsule 3    traZODone (Desyrel) 100 mg tablet Take 1 tablet (100 mg) by mouth once daily at bedtime.      zonisamide (Zonegran) 100 mg capsule Take 2 capsules (200 mg) by mouth once daily at  bedtime.      tiZANidine (Zanaflex) 2 mg tablet Take 1 tablet (2 mg) by mouth 2 times a day as needed for muscle spasms. 180 tablet 1     No current facility-administered medications for this visit.       Objective     Visit Vitals  /71 (BP Location: Left arm, Patient Position: Sitting, BP Cuff Size: Adult long)   Pulse 74   Temp 36 °C (96.8 °F) (Temporal)   Resp 16   Ht 1.829 m (6')   Wt 119 kg (262 lb)   SpO2 99%   BMI 35.53 kg/m²   Smoking Status Never   BSA 2.46 m²        Constitutional: Well nourished, well developed, appears stated age  Eyes: no scleral icterus, no conjunctival injection  Ears: normal external auditory canal, no retraction or bulging of tympanic membranes  Neck: no thyromegaly  Cardiovascular: regular rate and rhythm, no leg edema  Respiratory: normal respiratory effort, clear to auscultation bilaterally  Musculoskeletal: No gross deformities appreciated  Skin: Warm, dry. No rashes  Neurologic: Alert, CNs II-XII grossly intact..  Psych: Appropriate mood and affect.        Assessment/Plan   Problem List Items Addressed This Visit       Chronic low back pain    Relevant Medications    tiZANidine (Zanaflex) 2 mg tablet    Gender dysphoria in adult     Doing well with topical estradiol  Continue progesterone 200 mg daily and bicalutamide 50 mg daily  Labs ordered for next visit.   Working on getting letters for bottom surgery with Dr. Warren           Relevant Orders    Estradiol    Estrone    Testosterone     Other Visit Diagnoses       Annual physical exam    -  Primary    preventative care up to date    Need for hepatitis C screening test        Relevant Orders    Hepatitis C Antibody    Immunity status testing        Relevant Orders    Varicella zoster antibody, IgG    Hepatitis B surface antibody    Medication management        Relevant Orders    Comprehensive Metabolic Panel          Follow up 3-4 months.    Minda Veliz DO

## 2024-05-17 ENCOUNTER — OFFICE VISIT (OUTPATIENT)
Dept: PRIMARY CARE | Facility: CLINIC | Age: 34
End: 2024-05-17
Payer: MEDICAID

## 2024-05-17 VITALS
DIASTOLIC BLOOD PRESSURE: 71 MMHG | SYSTOLIC BLOOD PRESSURE: 112 MMHG | WEIGHT: 262 LBS | BODY MASS INDEX: 35.49 KG/M2 | HEIGHT: 72 IN | RESPIRATION RATE: 16 BRPM | HEART RATE: 74 BPM | OXYGEN SATURATION: 99 % | TEMPERATURE: 96.8 F

## 2024-05-17 DIAGNOSIS — M54.42 CHRONIC LEFT-SIDED LOW BACK PAIN WITH LEFT-SIDED SCIATICA: ICD-10-CM

## 2024-05-17 DIAGNOSIS — Z01.84 IMMUNITY STATUS TESTING: ICD-10-CM

## 2024-05-17 DIAGNOSIS — Z79.899 MEDICATION MANAGEMENT: ICD-10-CM

## 2024-05-17 DIAGNOSIS — G89.29 CHRONIC LEFT-SIDED LOW BACK PAIN WITH LEFT-SIDED SCIATICA: ICD-10-CM

## 2024-05-17 DIAGNOSIS — Z00.00 ANNUAL PHYSICAL EXAM: Primary | ICD-10-CM

## 2024-05-17 DIAGNOSIS — F64.0 GENDER DYSPHORIA IN ADULT: ICD-10-CM

## 2024-05-17 DIAGNOSIS — Z11.59 NEED FOR HEPATITIS C SCREENING TEST: ICD-10-CM

## 2024-05-17 PROCEDURE — 99395 PREV VISIT EST AGE 18-39: CPT | Performed by: FAMILY MEDICINE

## 2024-05-17 PROCEDURE — 1036F TOBACCO NON-USER: CPT | Performed by: FAMILY MEDICINE

## 2024-05-17 RX ORDER — TIZANIDINE 2 MG/1
2 TABLET ORAL 2 TIMES DAILY PRN
Qty: 180 TABLET | Refills: 1 | Status: SHIPPED | OUTPATIENT
Start: 2024-05-17 | End: 2024-11-13

## 2024-05-17 SDOH — ECONOMIC STABILITY: FOOD INSECURITY: WITHIN THE PAST 12 MONTHS, YOU WORRIED THAT YOUR FOOD WOULD RUN OUT BEFORE YOU GOT MONEY TO BUY MORE.: NEVER TRUE

## 2024-05-17 SDOH — ECONOMIC STABILITY: FOOD INSECURITY: WITHIN THE PAST 12 MONTHS, THE FOOD YOU BOUGHT JUST DIDN'T LAST AND YOU DIDN'T HAVE MONEY TO GET MORE.: NEVER TRUE

## 2024-05-17 ASSESSMENT — LIFESTYLE VARIABLES
HOW OFTEN DO YOU HAVE A DRINK CONTAINING ALCOHOL: NEVER
SKIP TO QUESTIONS 9-10: 1
HOW MANY STANDARD DRINKS CONTAINING ALCOHOL DO YOU HAVE ON A TYPICAL DAY: PATIENT DOES NOT DRINK
AUDIT-C TOTAL SCORE: 0
HOW OFTEN DO YOU HAVE SIX OR MORE DRINKS ON ONE OCCASION: NEVER

## 2024-05-17 ASSESSMENT — ENCOUNTER SYMPTOMS
OCCASIONAL FEELINGS OF UNSTEADINESS: 0
DEPRESSION: 0
LOSS OF SENSATION IN FEET: 0

## 2024-05-17 ASSESSMENT — ANXIETY QUESTIONNAIRES
4. TROUBLE RELAXING: NOT AT ALL
3. WORRYING TOO MUCH ABOUT DIFFERENT THINGS: NOT AT ALL
IF YOU CHECKED OFF ANY PROBLEMS ON THIS QUESTIONNAIRE, HOW DIFFICULT HAVE THESE PROBLEMS MADE IT FOR YOU TO DO YOUR WORK, TAKE CARE OF THINGS AT HOME, OR GET ALONG WITH OTHER PEOPLE: NOT DIFFICULT AT ALL
6. BECOMING EASILY ANNOYED OR IRRITABLE: NOT AT ALL
5. BEING SO RESTLESS THAT IT IS HARD TO SIT STILL: NOT AT ALL
2. NOT BEING ABLE TO STOP OR CONTROL WORRYING: NOT AT ALL
7. FEELING AFRAID AS IF SOMETHING AWFUL MIGHT HAPPEN: NOT AT ALL
GAD7 TOTAL SCORE: 0
1. FEELING NERVOUS, ANXIOUS, OR ON EDGE: NOT AT ALL

## 2024-05-17 ASSESSMENT — PAIN SCALES - GENERAL: PAINLEVEL: 4

## 2024-05-17 ASSESSMENT — PATIENT HEALTH QUESTIONNAIRE - PHQ9
2. FEELING DOWN, DEPRESSED OR HOPELESS: NOT AT ALL
1. LITTLE INTEREST OR PLEASURE IN DOING THINGS: NOT AT ALL
SUM OF ALL RESPONSES TO PHQ9 QUESTIONS 1 & 2: 0

## 2024-05-17 NOTE — ASSESSMENT & PLAN NOTE
Doing well with topical estradiol  Continue progesterone 200 mg daily and bicalutamide 50 mg daily  Labs ordered for next visit.   Working on getting letters for bottom surgery with Dr. Warren

## 2024-06-10 ENCOUNTER — TELEPHONE (OUTPATIENT)
Dept: PRIMARY CARE | Facility: CLINIC | Age: 34
End: 2024-06-10
Payer: MEDICAID

## 2024-06-10 DIAGNOSIS — J30.89 ENVIRONMENTAL AND SEASONAL ALLERGIES: ICD-10-CM

## 2024-06-10 RX ORDER — CETIRIZINE HYDROCHLORIDE 10 MG/1
10 TABLET ORAL DAILY
Qty: 90 TABLET | Refills: 3 | Status: SHIPPED | OUTPATIENT
Start: 2024-06-10 | End: 2025-06-10

## 2024-06-10 NOTE — TELEPHONE ENCOUNTER
Pt is requesting cetrizine to [facility] in [place]. This is showing as  in EPIC and pt states the pharmacy is unable to send a surescript.

## 2024-07-10 ENCOUNTER — HOSPITAL ENCOUNTER (EMERGENCY)
Facility: HOSPITAL | Age: 34
Discharge: HOME | End: 2024-07-10
Attending: EMERGENCY MEDICINE
Payer: MEDICAID

## 2024-07-10 ENCOUNTER — APPOINTMENT (OUTPATIENT)
Dept: RADIOLOGY | Facility: HOSPITAL | Age: 34
End: 2024-07-10
Payer: MEDICAID

## 2024-07-10 ENCOUNTER — APPOINTMENT (OUTPATIENT)
Dept: CARDIOLOGY | Facility: HOSPITAL | Age: 34
End: 2024-07-10
Payer: MEDICAID

## 2024-07-10 VITALS
OXYGEN SATURATION: 100 % | DIASTOLIC BLOOD PRESSURE: 83 MMHG | WEIGHT: 260 LBS | SYSTOLIC BLOOD PRESSURE: 136 MMHG | TEMPERATURE: 97.9 F | HEIGHT: 72 IN | RESPIRATION RATE: 16 BRPM | HEART RATE: 61 BPM | BODY MASS INDEX: 35.21 KG/M2

## 2024-07-10 DIAGNOSIS — R51.9 NONINTRACTABLE HEADACHE, UNSPECIFIED CHRONICITY PATTERN, UNSPECIFIED HEADACHE TYPE: Primary | ICD-10-CM

## 2024-07-10 LAB
ALBUMIN SERPL BCP-MCNC: 4.7 G/DL (ref 3.4–5)
ALP SERPL-CCNC: 69 U/L (ref 33–120)
ALT SERPL W P-5'-P-CCNC: 22 U/L (ref 7–52)
AMPHETAMINES UR QL SCN: NORMAL
ANION GAP BLDV CALCULATED.4IONS-SCNC: 7 MMOL/L (ref 10–25)
ANION GAP SERPL CALC-SCNC: 11 MMOL/L (ref 10–20)
APAP SERPL-MCNC: <10 UG/ML
APPEARANCE UR: ABNORMAL
AST SERPL W P-5'-P-CCNC: 16 U/L (ref 9–39)
B-HCG SERPL-ACNC: <2 MIU/ML
BARBITURATES UR QL SCN: NORMAL
BASE EXCESS BLDA CALC-SCNC: -0.7 MMOL/L (ref -2–3)
BASE EXCESS BLDV CALC-SCNC: 1.4 MMOL/L (ref -2–3)
BASOPHILS # BLD AUTO: 0.05 X10*3/UL (ref 0–0.1)
BASOPHILS NFR BLD AUTO: 0.5 %
BENZODIAZ UR QL SCN: NORMAL
BILIRUB SERPL-MCNC: 0.4 MG/DL (ref 0–1.2)
BILIRUB UR STRIP.AUTO-MCNC: NEGATIVE MG/DL
BODY TEMPERATURE: 37 DEGREES CELSIUS
BODY TEMPERATURE: ABNORMAL
BUN SERPL-MCNC: 12 MG/DL (ref 6–23)
BZE UR QL SCN: NORMAL
CA-I BLDV-SCNC: 1.26 MMOL/L (ref 1.1–1.33)
CALCIUM SERPL-MCNC: 9.5 MG/DL (ref 8.6–10.3)
CANNABINOIDS UR QL SCN: NORMAL
CARDIAC TROPONIN I PNL SERPL HS: <3 NG/L (ref 0–20)
CHLORIDE BLDV-SCNC: 105 MMOL/L (ref 98–107)
CHLORIDE SERPL-SCNC: 106 MMOL/L (ref 98–107)
CO2 SERPL-SCNC: 24 MMOL/L (ref 21–32)
COHGB MFR BLDA: 1.3 %
COLOR UR: ABNORMAL
CREAT SERPL-MCNC: 1.31 MG/DL (ref 0.5–1.3)
DO-HGB MFR BLDA: 97.5 % (ref 0–5)
EGFRCR SERPLBLD CKD-EPI 2021: 55 ML/MIN/1.73M*2
EOSINOPHIL # BLD AUTO: 0.06 X10*3/UL (ref 0–0.7)
EOSINOPHIL NFR BLD AUTO: 0.6 %
ERYTHROCYTE [DISTWIDTH] IN BLOOD BY AUTOMATED COUNT: 12.7 % (ref 11.5–14.5)
ETHANOL SERPL-MCNC: <10 MG/DL
FENTANYL+NORFENTANYL UR QL SCN: NORMAL
GLUCOSE BLDV-MCNC: 89 MG/DL (ref 74–99)
GLUCOSE SERPL-MCNC: 88 MG/DL (ref 74–99)
GLUCOSE UR STRIP.AUTO-MCNC: NORMAL MG/DL
HCO3 BLDA-SCNC: 23.4 MMOL/L (ref 22–26)
HCO3 BLDV-SCNC: 27.7 MMOL/L (ref 22–26)
HCT VFR BLD AUTO: 44.2 % (ref 36–52)
HCT VFR BLD EST: 42 % (ref 36–52)
HGB BLD-MCNC: 15.2 G/DL (ref 12–17.5)
HGB BLDA-MCNC: 13.8 G/DL (ref 13.5–17.5)
HGB BLDV-MCNC: 14.1 G/DL (ref 12–17.5)
IMM GRANULOCYTES # BLD AUTO: 0.03 X10*3/UL (ref 0–0.7)
IMM GRANULOCYTES NFR BLD AUTO: 0.3 % (ref 0–0.9)
INHALED O2 CONCENTRATION: 21 %
INHALED O2 CONCENTRATION: 21 %
KETONES UR STRIP.AUTO-MCNC: NEGATIVE MG/DL
LACTATE BLDV-SCNC: 1 MMOL/L (ref 0.4–2)
LEUKOCYTE ESTERASE UR QL STRIP.AUTO: NEGATIVE
LEVETIRACETAM SERPL-MCNC: 16 UG/ML (ref 10–40)
LYMPHOCYTES # BLD AUTO: 2.48 X10*3/UL (ref 1.2–4.8)
LYMPHOCYTES NFR BLD AUTO: 25.3 %
MCH RBC QN AUTO: 30.2 PG (ref 26–34)
MCHC RBC AUTO-ENTMCNC: 34.4 G/DL (ref 32–36)
MCV RBC AUTO: 88 FL (ref 80–100)
METHADONE UR QL SCN: NORMAL
METHGB MFR BLDA: 0.4 % (ref 0–1.5)
MONOCYTES # BLD AUTO: 0.68 X10*3/UL (ref 0.1–1)
MONOCYTES NFR BLD AUTO: 6.9 %
NEUTROPHILS # BLD AUTO: 6.51 X10*3/UL (ref 1.2–7.7)
NEUTROPHILS NFR BLD AUTO: 66.4 %
NITRITE UR QL STRIP.AUTO: NEGATIVE
NRBC BLD-RTO: 0 /100 WBCS (ref 0–0)
OPIATES UR QL SCN: NORMAL
OXYCODONE+OXYMORPHONE UR QL SCN: NORMAL
OXYHGB MFR BLDA: 97.5 % (ref 94–98)
OXYHGB MFR BLDV: 49.6 % (ref 45–75)
PCO2 BLDA: 36 MM HG (ref 38–42)
PCO2 BLDV: 49 MM HG (ref 41–51)
PCP UR QL SCN: NORMAL
PH BLDA: 7.42 PH (ref 7.38–7.42)
PH BLDV: 7.36 PH (ref 7.33–7.43)
PH UR STRIP.AUTO: 6 [PH]
PLATELET # BLD AUTO: 243 X10*3/UL (ref 150–450)
PO2 BLDA: 97 MM HG (ref 85–95)
PO2 BLDV: 34 MM HG (ref 35–45)
POTASSIUM BLDV-SCNC: 3.3 MMOL/L (ref 3.5–5.3)
POTASSIUM SERPL-SCNC: 3.4 MMOL/L (ref 3.5–5.3)
PROT SERPL-MCNC: 7.9 G/DL (ref 6.4–8.2)
PROT UR STRIP.AUTO-MCNC: NEGATIVE MG/DL
RBC # BLD AUTO: 5.03 X10*6/UL (ref 4–5.9)
RBC # UR STRIP.AUTO: NEGATIVE /UL
SALICYLATES SERPL-MCNC: <3 MG/DL
SAO2 % BLDA: 99 % (ref 94–100)
SAO2 % BLDV: 51 % (ref 45–75)
SODIUM BLDV-SCNC: 136 MMOL/L (ref 136–145)
SODIUM SERPL-SCNC: 138 MMOL/L (ref 136–145)
SP GR UR STRIP.AUTO: 1.01
UROBILINOGEN UR STRIP.AUTO-MCNC: NORMAL MG/DL
WBC # BLD AUTO: 9.8 X10*3/UL (ref 4.4–11.3)

## 2024-07-10 PROCEDURE — 70450 CT HEAD/BRAIN W/O DYE: CPT | Performed by: RADIOLOGY

## 2024-07-10 PROCEDURE — 96361 HYDRATE IV INFUSION ADD-ON: CPT | Performed by: EMERGENCY MEDICINE

## 2024-07-10 PROCEDURE — 2500000004 HC RX 250 GENERAL PHARMACY W/ HCPCS (ALT 636 FOR OP/ED): Performed by: EMERGENCY MEDICINE

## 2024-07-10 PROCEDURE — 84132 ASSAY OF SERUM POTASSIUM: CPT | Performed by: EMERGENCY MEDICINE

## 2024-07-10 PROCEDURE — 84702 CHORIONIC GONADOTROPIN TEST: CPT | Performed by: EMERGENCY MEDICINE

## 2024-07-10 PROCEDURE — 36415 COLL VENOUS BLD VENIPUNCTURE: CPT | Performed by: EMERGENCY MEDICINE

## 2024-07-10 PROCEDURE — 85025 COMPLETE CBC W/AUTO DIFF WBC: CPT | Performed by: EMERGENCY MEDICINE

## 2024-07-10 PROCEDURE — 93005 ELECTROCARDIOGRAM TRACING: CPT

## 2024-07-10 PROCEDURE — 80307 DRUG TEST PRSMV CHEM ANLYZR: CPT | Performed by: EMERGENCY MEDICINE

## 2024-07-10 PROCEDURE — 80177 DRUG SCRN QUAN LEVETIRACETAM: CPT | Mod: PORLAB | Performed by: EMERGENCY MEDICINE

## 2024-07-10 PROCEDURE — 82375 ASSAY CARBOXYHB QUANT: CPT | Performed by: EMERGENCY MEDICINE

## 2024-07-10 PROCEDURE — 96374 THER/PROPH/DIAG INJ IV PUSH: CPT | Performed by: EMERGENCY MEDICINE

## 2024-07-10 PROCEDURE — 70450 CT HEAD/BRAIN W/O DYE: CPT

## 2024-07-10 PROCEDURE — 99285 EMERGENCY DEPT VISIT HI MDM: CPT | Mod: 25 | Performed by: EMERGENCY MEDICINE

## 2024-07-10 PROCEDURE — 84484 ASSAY OF TROPONIN QUANT: CPT | Performed by: EMERGENCY MEDICINE

## 2024-07-10 PROCEDURE — 81003 URINALYSIS AUTO W/O SCOPE: CPT | Performed by: EMERGENCY MEDICINE

## 2024-07-10 PROCEDURE — 80143 DRUG ASSAY ACETAMINOPHEN: CPT | Performed by: EMERGENCY MEDICINE

## 2024-07-10 RX ORDER — PROCHLORPERAZINE EDISYLATE 5 MG/ML
5 INJECTION INTRAMUSCULAR; INTRAVENOUS ONCE
Status: COMPLETED | OUTPATIENT
Start: 2024-07-10 | End: 2024-07-10

## 2024-07-10 ASSESSMENT — COLUMBIA-SUICIDE SEVERITY RATING SCALE - C-SSRS
2. HAVE YOU ACTUALLY HAD ANY THOUGHTS OF KILLING YOURSELF?: YES
5. HAVE YOU STARTED TO WORK OUT OR WORKED OUT THE DETAILS OF HOW TO KILL YOURSELF? DO YOU INTEND TO CARRY OUT THIS PLAN?: NO
4. HAVE YOU HAD THESE THOUGHTS AND HAD SOME INTENTION OF ACTING ON THEM?: NO
6. HAVE YOU EVER DONE ANYTHING, STARTED TO DO ANYTHING, OR PREPARED TO DO ANYTHING TO END YOUR LIFE?: YES
1. IN THE PAST MONTH, HAVE YOU WISHED YOU WERE DEAD OR WISHED YOU COULD GO TO SLEEP AND NOT WAKE UP?: YES
6. HAVE YOU EVER DONE ANYTHING, STARTED TO DO ANYTHING, OR PREPARED TO DO ANYTHING TO END YOUR LIFE?: NO

## 2024-07-10 ASSESSMENT — LIFESTYLE VARIABLES
HAVE YOU EVER FELT YOU SHOULD CUT DOWN ON YOUR DRINKING: NO
EVER FELT BAD OR GUILTY ABOUT YOUR DRINKING: NO
TOTAL SCORE: 0
HAVE PEOPLE ANNOYED YOU BY CRITICIZING YOUR DRINKING: NO
EVER HAD A DRINK FIRST THING IN THE MORNING TO STEADY YOUR NERVES TO GET RID OF A HANGOVER: NO

## 2024-07-10 ASSESSMENT — PAIN - FUNCTIONAL ASSESSMENT: PAIN_FUNCTIONAL_ASSESSMENT: 0-10

## 2024-07-10 ASSESSMENT — PAIN DESCRIPTION - DESCRIPTORS: DESCRIPTORS: HEADACHE;PRESSURE

## 2024-07-10 ASSESSMENT — PAIN DESCRIPTION - LOCATION: LOCATION: HEAD

## 2024-07-10 ASSESSMENT — PAIN DESCRIPTION - ORIENTATION: ORIENTATION: RIGHT

## 2024-07-10 NOTE — ED TRIAGE NOTES
"Patient arrived to ED from home with c/o \"feeling off\" around 0200. Patient reports having tonic clonic seizures in the past, takes Keppra, cannot remember last seizure (maybe a couple months ago). Patient is very slow to answer but is A&Ox4. Patient is feeling lightheaded, balance is off. Patient c/o headache to right temple. Patient denies having a seizure tonight. Patient shook her head to answer a question and got dizzier. Patient denies any other pain. Patient reports seeing shadows in her peripheral vision but they are gone when she looks that way. Patient reports feeling nauseous before she took a shower around 0200, says she woke up and almost threw up. Patient stable at this time.   "

## 2024-07-10 NOTE — ED PROVIDER NOTES
"HPI   Chief Complaint   Patient presents with    Dizziness     \"Heavy headed\"    Hallucinations     visual    patient feels off       HPI  HISTORY OF PRESENT ILLNESS:  Patient is a 33-year-old female presents the emergency department for altered mental status.  Patient last known well was at 2 AM.  Param Who lives with her is here to provide additional history.  Patient was having a shower.  After the shower, the patient started to feel like she was very slow to respond.  She felt off balance but is still able to walk, though Param notes that she is walking with a wide gait.  The patient is also feeling lightheaded.  There is also an associated visual hallucinations where she believes her shapes off to the side of her vision.  She is having a right-sided headache.  She denies having a seizure this evening.  Param states that she is acting as if she had a seizure and was postictal at this time with her speech pattern.    Past Medical History: Seizures on Keppra, chronic low back pain  Social History: Denied recreational drug use or EtOH use tonight    __________________________________________________________  PHYSICAL EXAM:    Appearance: Alert, oriented , cooperative   Skin: Intact,  dry skin, no lesions, rash, petechiae or purpura.   Eyes: PERRLA, EOMs intact,  Conjunctiva pink with no redness or exudates.    HENT: Normocephalic, atraumatic. Nares patent   Neck: Supple. Trachea at midline.   Pulmonary: Lung sounds are clear bilaterally.  There is no rales, rhonchi, or wheezing.  Cardiac: Regular rate and rhythm, no rubs, murmurs, or gallops. No JVD,   Abdomen: Abdomen is soft, nontender, and nondistended.  No palpable organomegaly.  No rebound or guarding.  No CVA tenderness. Nonsurgical abdomen  Genitourinary: Exam deferred.  Musculoskeletal: no edema, pain, cyanosis, or deformity in extremities. Pulses full and equal.   Neurological:  Cranial nerves are grossly intact, grossly normal sensation, no weakness, no " focal findings identified.    Interval: Baseline  Time: 0454  Person Administering Scale: Cy Mills DO     1a  Level of consciousness: 0=alert; keenly responsive  1b. LOC questions:  0=Performs both tasks correctly  1c. LOC commands: 0=Performs both tasks correctly  2.  Best Gaze: 0=normal  3. Visual: 0=No visual loss  4. Facial Palsy: 0=Normal symmetric movement  5a. Motor left arm: 0=No drift, limb holds 90 (or 45) degrees for full 10 seconds  5b.  Motor right arm: 0=No drift, limb holds 90 (or 45) degrees for full 10 seconds  6a. motor left le=No drift, limb holds 90 (or 45) degrees for full 10 seconds  6b  Motor right le=No drift, limb holds 90 (or 45) degrees for full 10 seconds  7. Limb Ataxia: 0=Absent  8.  Sensory: 0=Normal; no sensory loss  9. Best Language:  0=No aphasia, normal  10. Dysarthria: 0=Normal  11. Extinction and Inattention: 0=No abnormality    Total:   0        VAN: VAN: Negative         __________________________________________________________  MEDICAL DECISION MAKING:    Patient was seen and examined. Differential diagnosis for   Headache along with slower speech  Includes  Atypical migraine,  Seizure,  Possible tox ingestion.  The patient  Currently has a negative NIH score.  Param, the patient is significant  Other stated that the patient appears like she may be postictal at this time.  Currently has a mild headache.  Vital signs within normal limits.  Do not believe this was a stroke.  Patient CT head scan was negative.  Laboratory results were overall unremarkable.  After receiving Compazine and IV fluids, patient headache had  Resolved and patient's symptoms had improved significantly.  She was able to pass ambulation trial.  Explained to her  Believe this may have been a typical migraine.  Patient was given return precautions.  Was advised neurology follow-up.  She verbalized understanding, agreed to plan, and patient was discharged home.        Chronic Medical  Conditions Significantly Affecting Care: Chronic low back pain, seizures on reported Keppra    External Records Reviewed: I reviewed recent and relevant outside records including: Primary care physician note from May 17, 2024    Patient twelve-lead EKG interpreted by myself shows sinus rhythm, ventricular rate of 78, normal axis, normal MA interval, normal QRS duration, normal QT, no STEMI.    Cy Mills  Emergency Medicine                  [place] Coma Scale Score: 15                     Patient History   Past Medical History:   Diagnosis Date    ADHD (attention deficit hyperactivity disorder)     Anxiety     Chondromalacia patellae, right knee 10/24/2019    Chondromalacia of right patella    Chronic low back pain 04/13/2023    Depression     Dorsalgia, unspecified 06/24/2020    Acute back pain    Flushing 01/06/2021    Flushing    Generalized abdominal pain 11/02/2021    Abdominal pain, generalized    Local infection of the skin and subcutaneous tissue, unspecified 10/06/2020    Skin infection, bacterial    Nausea 03/10/2021    Nausea in adult    Other forms of dyspnea 03/08/2018    Dyspnea on exertion    Other hemorrhoids 07/15/2021    Internal hemorrhoids    Other skin changes 07/08/2021    Skin irritation    Otitis media, unspecified, left ear 05/01/2020    Acute otitis media, left    Personal history of COVID-19     Personal history of COVID-19    Personal history of diseases of the skin and subcutaneous tissue 03/10/2021    History of dermatitis    Personal history of other diseases of the respiratory system 10/06/2021    History of sore throat    Personal history of other diseases of the respiratory system 10/06/2021    History of laryngitis    Personal history of other specified conditions 08/17/2021    History of dysphagia    Personal history of other specified conditions 05/03/2018    History of palpitations    Personal history of other specified conditions 05/03/2018    History of exertional chest pain     Personal history of other specified conditions 05/03/2018    History of syncope    Seasonal asthma ([facility])      Past Surgical History:   Procedure Laterality Date    INNER EAR SURGERY  03/08/2018    Inner Ear Surgery    OTHER SURGICAL HISTORY  04/05/2022    Oral surgery    OTHER SURGICAL HISTORY  08/06/2021    Esophagogastroduodenoscopy    OTHER SURGICAL HISTORY  08/06/2021    Colonoscopy    TONSILLECTOMY  03/08/2018    Tonsillectomy     Family History   Problem Relation Name Age of Onset    Other (cva) Other      Epilepsy Other      Ulcerative colitis Other      Hypertension Other       Social History     Tobacco Use    Smoking status: Never     Passive exposure: Never    Smokeless tobacco: Never   Vaping Use    Vaping status: Never Used   Substance Use Topics    Alcohol use: Not Currently     Comment: very rare    Drug use: Yes     Types: Marijuana     Comment: ocassional edibles       Physical Exam   ED Triage Vitals [07/10/24 0335]   Temperature Heart Rate Respirations BP   36.6 °C (97.9 °F) 71 18 132/86      Pulse Ox Temp Source Heart Rate Source Patient Position   100 % Oral Monitor --      BP Location FiO2 (%)     -- --       Physical Exam    ED Course & MDM   Diagnoses as of 07/10/24 0751   Nonintractable headache, unspecified chronicity pattern, unspecified headache type       Medical Decision Making      Procedure  Procedures     Cy Mills,   07/10/24 0755       Cy Mills,   08/19/24 1121

## 2024-07-14 LAB
ATRIAL RATE: 58 BPM
P AXIS: 39 DEGREES
PR INTERVAL: 201 MS
Q ONSET: 251 MS
QRS COUNT: 9 BEATS
QRS DURATION: 103 MS
QT INTERVAL: 418 MS
QTC CALCULATION(BAZETT): 407 MS
QTC FREDERICIA: 411 MS
R AXIS: 41 DEGREES
T AXIS: 37 DEGREES
T OFFSET: 460 MS
VENTRICULAR RATE: 57 BPM

## 2024-07-23 ENCOUNTER — APPOINTMENT (OUTPATIENT)
Dept: CARDIOLOGY | Facility: HOSPITAL | Age: 34
End: 2024-07-23
Payer: MEDICAID

## 2024-07-23 ENCOUNTER — HOSPITAL ENCOUNTER (EMERGENCY)
Facility: HOSPITAL | Age: 34
Discharge: HOME | End: 2024-07-23
Payer: MEDICAID

## 2024-07-23 ENCOUNTER — LAB (OUTPATIENT)
Dept: LAB | Facility: LAB | Age: 34
End: 2024-07-23
Payer: MEDICAID

## 2024-07-23 ENCOUNTER — APPOINTMENT (OUTPATIENT)
Dept: RADIOLOGY | Facility: HOSPITAL | Age: 34
End: 2024-07-23
Payer: MEDICAID

## 2024-07-23 VITALS
OXYGEN SATURATION: 100 % | TEMPERATURE: 98.2 F | HEIGHT: 72 IN | HEART RATE: 94 BPM | SYSTOLIC BLOOD PRESSURE: 133 MMHG | RESPIRATION RATE: 20 BRPM | DIASTOLIC BLOOD PRESSURE: 82 MMHG | WEIGHT: 265 LBS | BODY MASS INDEX: 35.89 KG/M2

## 2024-07-23 DIAGNOSIS — F64.0 GENDER DYSPHORIA IN ADULT: ICD-10-CM

## 2024-07-23 DIAGNOSIS — Z11.59 NEED FOR HEPATITIS C SCREENING TEST: ICD-10-CM

## 2024-07-23 DIAGNOSIS — B34.9 VIRAL SYNDROME: Primary | ICD-10-CM

## 2024-07-23 DIAGNOSIS — Z01.84 IMMUNITY STATUS TESTING: ICD-10-CM

## 2024-07-23 DIAGNOSIS — Z79.899 MEDICATION MANAGEMENT: ICD-10-CM

## 2024-07-23 LAB
ALBUMIN SERPL BCP-MCNC: 4.1 G/DL (ref 3.4–5)
ALP SERPL-CCNC: 61 U/L (ref 33–120)
ALT SERPL W P-5'-P-CCNC: 22 U/L (ref 7–52)
ANION GAP SERPL CALC-SCNC: 11 MMOL/L (ref 10–20)
AST SERPL W P-5'-P-CCNC: 15 U/L (ref 9–39)
BILIRUB SERPL-MCNC: 0.5 MG/DL (ref 0–1.2)
BUN SERPL-MCNC: 11 MG/DL (ref 6–23)
CALCIUM SERPL-MCNC: 8.8 MG/DL (ref 8.6–10.3)
CHLORIDE SERPL-SCNC: 109 MMOL/L (ref 98–107)
CO2 SERPL-SCNC: 22 MMOL/L (ref 21–32)
CREAT SERPL-MCNC: 1.07 MG/DL (ref 0.5–1.3)
EGFRCR SERPLBLD CKD-EPI 2021: 70 ML/MIN/1.73M*2
FLUAV RNA RESP QL NAA+PROBE: NOT DETECTED
FLUBV RNA RESP QL NAA+PROBE: NOT DETECTED
GLUCOSE SERPL-MCNC: 91 MG/DL (ref 74–99)
POTASSIUM SERPL-SCNC: 4 MMOL/L (ref 3.5–5.3)
PROT SERPL-MCNC: 6.4 G/DL (ref 6.4–8.2)
S PYO DNA THROAT QL NAA+PROBE: NOT DETECTED
SARS-COV-2 RNA RESP QL NAA+PROBE: NOT DETECTED
SODIUM SERPL-SCNC: 138 MMOL/L (ref 136–145)

## 2024-07-23 PROCEDURE — 86803 HEPATITIS C AB TEST: CPT

## 2024-07-23 PROCEDURE — 82679 ASSAY OF ESTRONE: CPT

## 2024-07-23 PROCEDURE — 87651 STREP A DNA AMP PROBE: CPT | Performed by: PHYSICIAN ASSISTANT

## 2024-07-23 PROCEDURE — 36415 COLL VENOUS BLD VENIPUNCTURE: CPT

## 2024-07-23 PROCEDURE — 93005 ELECTROCARDIOGRAM TRACING: CPT

## 2024-07-23 PROCEDURE — 84403 ASSAY OF TOTAL TESTOSTERONE: CPT

## 2024-07-23 PROCEDURE — 82670 ASSAY OF TOTAL ESTRADIOL: CPT

## 2024-07-23 PROCEDURE — 71046 X-RAY EXAM CHEST 2 VIEWS: CPT | Performed by: RADIOLOGY

## 2024-07-23 PROCEDURE — 80053 COMPREHEN METABOLIC PANEL: CPT

## 2024-07-23 PROCEDURE — 86706 HEP B SURFACE ANTIBODY: CPT

## 2024-07-23 PROCEDURE — 94640 AIRWAY INHALATION TREATMENT: CPT

## 2024-07-23 PROCEDURE — 71046 X-RAY EXAM CHEST 2 VIEWS: CPT

## 2024-07-23 PROCEDURE — 87636 SARSCOV2 & INF A&B AMP PRB: CPT | Performed by: PHYSICIAN ASSISTANT

## 2024-07-23 PROCEDURE — 86787 VARICELLA-ZOSTER ANTIBODY: CPT

## 2024-07-23 PROCEDURE — 99283 EMERGENCY DEPT VISIT LOW MDM: CPT

## 2024-07-23 PROCEDURE — 2500000002 HC RX 250 W HCPCS SELF ADMINISTERED DRUGS (ALT 637 FOR MEDICARE OP, ALT 636 FOR OP/ED): Performed by: PHYSICIAN ASSISTANT

## 2024-07-23 RX ORDER — ONDANSETRON 4 MG/1
4 TABLET, FILM COATED ORAL EVERY 6 HOURS
Qty: 12 TABLET | Refills: 0 | Status: SHIPPED | OUTPATIENT
Start: 2024-07-23 | End: 2024-07-26

## 2024-07-23 RX ORDER — IPRATROPIUM BROMIDE AND ALBUTEROL SULFATE 2.5; .5 MG/3ML; MG/3ML
3 SOLUTION RESPIRATORY (INHALATION) ONCE
Status: COMPLETED | OUTPATIENT
Start: 2024-07-23 | End: 2024-07-23

## 2024-07-23 RX ORDER — BENZONATATE 100 MG/1
100 CAPSULE ORAL EVERY 8 HOURS
Qty: 21 CAPSULE | Refills: 0 | Status: SHIPPED | OUTPATIENT
Start: 2024-07-23 | End: 2024-07-30

## 2024-07-23 RX ORDER — NAPROXEN 500 MG/1
500 TABLET ORAL
Qty: 30 TABLET | Refills: 0 | Status: SHIPPED | OUTPATIENT
Start: 2024-07-23 | End: 2024-08-07

## 2024-07-23 RX ORDER — ACETAMINOPHEN 325 MG/1
975 TABLET ORAL ONCE
Status: COMPLETED | OUTPATIENT
Start: 2024-07-23 | End: 2024-07-23

## 2024-07-23 ASSESSMENT — PAIN DESCRIPTION - LOCATION: LOCATION: THROAT

## 2024-07-23 ASSESSMENT — PAIN SCALES - GENERAL: PAINLEVEL_OUTOF10: 5 - MODERATE PAIN

## 2024-07-23 ASSESSMENT — PAIN DESCRIPTION - PAIN TYPE: TYPE: ACUTE PAIN

## 2024-07-23 ASSESSMENT — PAIN - FUNCTIONAL ASSESSMENT: PAIN_FUNCTIONAL_ASSESSMENT: 0-10

## 2024-07-24 LAB
ESTRADIOL SERPL-MCNC: 67 PG/ML
HBV SURFACE AB SER-ACNC: <3.1 MIU/ML
HCV AB SER QL: NONREACTIVE
TESTOST SERPL-MCNC: 232 NG/DL (ref 0–1000)
VARICELLA ZOSTER IGG INDEX: 1.2 IA
VZV IGG SER QL IA: POSITIVE

## 2024-07-24 NOTE — ED PROVIDER NOTES
EMERGENCY MEDICINE EVALUATION NOTE    History of Present Illness     Chief Complaint:   Chief Complaint   Patient presents with    Flu Symptoms    Headache       HPI: Jennifer Terrell is a 33 y.o. adult presents with a chief complaint of viral symptoms.  Patient reports that symptoms started about an hour prior to arrival in the ER.  She states that she had a little bit of nausea low but of a cough.  She also has an associated headache and bodyaches.  She denies any known sick contacts.  Patient states that she felt like her throat was a little sore and she also felt like she had a little wheezing with the cough.  Patient did not take any thing at home for the symptoms.  Patient denies any history of any diabetes or hypertension or cardiac issues.  Patient has a history of any asthma.  She reports that she is on hormone therapy as well as multiple psychiatric medications.  Patient denies any current chest pain.    Previous History     Past Medical History:   Diagnosis Date    ADHD (attention deficit hyperactivity disorder)     Anxiety     Chondromalacia patellae, right knee 10/24/2019    Chondromalacia of right patella    Chronic low back pain 04/13/2023    Depression     Dorsalgia, unspecified 06/24/2020    Acute back pain    Flushing 01/06/2021    Flushing    Generalized abdominal pain 11/02/2021    Abdominal pain, generalized    Local infection of the skin and subcutaneous tissue, unspecified 10/06/2020    Skin infection, bacterial    Nausea 03/10/2021    Nausea in adult    Other forms of dyspnea 03/08/2018    Dyspnea on exertion    Other hemorrhoids 07/15/2021    Internal hemorrhoids    Other skin changes 07/08/2021    Skin irritation    Otitis media, unspecified, left ear 05/01/2020    Acute otitis media, left    Personal history of COVID-19     Personal history of COVID-19    Personal history of diseases of the skin and subcutaneous tissue 03/10/2021    History of dermatitis    Personal history of other  diseases of the respiratory system 10/06/2021    History of sore throat    Personal history of other diseases of the respiratory system 10/06/2021    History of laryngitis    Personal history of other specified conditions 08/17/2021    History of dysphagia    Personal history of other specified conditions 05/03/2018    History of palpitations    Personal history of other specified conditions 05/03/2018    History of exertional chest pain    Personal history of other specified conditions 05/03/2018    History of syncope    Seasonal asthma ([facility])      Past Surgical History:   Procedure Laterality Date    INNER EAR SURGERY  03/08/2018    Inner Ear Surgery    OTHER SURGICAL HISTORY  04/05/2022    Oral surgery    OTHER SURGICAL HISTORY  08/06/2021    Esophagogastroduodenoscopy    OTHER SURGICAL HISTORY  08/06/2021    Colonoscopy    TONSILLECTOMY  03/08/2018    Tonsillectomy     Social History     Tobacco Use    Smoking status: Never     Passive exposure: Never    Smokeless tobacco: Never   Vaping Use    Vaping status: Never Used   Substance Use Topics    Alcohol use: Not Currently     Comment: very rare    Drug use: Yes     Types: Marijuana     Comment: ocassional edibles     Family History   Problem Relation Name Age of Onset    Other (cva) Other      Epilepsy Other      Ulcerative colitis Other      Hypertension Other       Allergies   Allergen Reactions    Cinnamon Anaphylaxis     Artificial cinnamon flavored products    Penicillin V Potassium Anaphylaxis    Penicillins Anaphylaxis, Swelling, Other and Unknown     throat swelling, SOB    Petrolatum,White Rash    Petroleum Jelly [White Petrolatum] Hives     Cold, burning sensation and welts     Current Outpatient Medications   Medication Instructions    ARIPiprazole (ABILIFY) 20 mg, oral, Daily    benzonatate (TESSALON) 100 mg, oral, Every 8 hours, Do not crush or chew.    bicalutamide (CASODEX) 50 mg, oral, Daily    cetirizine (ZYRTEC) 10 mg, oral, Daily, Allergy  medication    estradioL 2.5 mg, transdermal, Daily, (2 packets)    famotidine (PEPCID) 40 mg, oral, 2 times daily    lamoTRIgine (LaMICtal) 150 mg tablet 1 tablet, oral, 2 times daily    levETIRAcetam (KEPPRA) 1,500 mg, oral, Every 12 hours    multivitamin with minerals (multivitamin) tablet 1 tablet, oral, Daily RT    naproxen (NAPROSYN) 500 mg, oral, 2 times daily (morning and late afternoon)    ondansetron (ZOFRAN) 4 mg, oral, Every 6 hours    prazosin (MINIPRESS) 1 mg, oral, Nightly, For nightmares    progesterone (PROMETRIUM) 200 mg, oral, Daily    tiZANidine (ZANAFLEX) 2 mg, oral, 2 times daily PRN    traZODone (DESYREL) 100 mg, oral, Nightly    zonisamide (Zonegran) 100 mg capsule 2 capsules, oral, Nightly       Physical Exam     Appearance: Alert, oriented , cooperative     Skin: Intact,  dry skin, no lesions, rash, petechiae or purpura.      Eyes: PERRLA, EOMs intact,  Conjunctiva pink      ENT: Hearing grossly intact. Pharynx erythema, uvula midline.      Neck: Supple. Trachea at midline.      Pulmonary: Clear bilaterally. No rales, rhonchi or wheezing. No accessory muscle use or stridor.     Cardiac: Normal rate and rhythm without murmur     Abdomen: Soft, nontender, active bowel sounds.     Musculoskeletal: Full range of motion. no pain, edema, or deformity.      Neurological:Cranial nerves II through XII are grossly intact, normal sensation, no weakness, no focal findings identified.  No nuchal rigidity or meningeal signs.     Results     Labs Reviewed   GROUP A STREPTOCOCCUS, PCR - Normal       Result Value    Group A Strep PCR Not Detected     INFLUENZA A AND B PCR - Normal    Flu A Result Not Detected      Flu B Result Not Detected      Narrative:     This assay is an in vitro diagnostic multiplex nucleic acid amplification test for the detection and discrimination of Influenza A & B from nasopharyngeal specimens, and has been validated for use at [facility]. Negative results  do not preclude Influenza A/B infections, and should not be used as the sole basis for diagnosis, treatment, or other management decisions. If Influenza A/B and RSV PCR results are negative, testing for Parainfluenza virus, Adenovirus and Metapneumovirus is routinely performed for [facility] inpatients, and is available on other patients by placing an add-on request.   SARS-COV-2 PCR - Normal    Coronavirus 2019, PCR Not Detected      Narrative:     This assay has received FDA Emergency Use Authorization (EUA) and is only authorized for the duration of time that circumstances exist to justify the authorization of the emergency use of in vitro diagnostic tests for the detection of SARS-CoV-2 virus and/or diagnosis of COVID-19 infection under section 564(b)(1) of the Act, 21 U.S.C. 360bbb-3(b)(1). This assay is an in vitro diagnostic nucleic acid amplification test for the qualitative detection of SARS-CoV-2 from nasopharyngeal specimens and has been validated for use at [facility]. Negative results do not preclude COVID-19 infections and should not be used as the sole basis for diagnosis, treatment, or other management decisions.       XR chest 2 views   Final Result   No acute cardiopulmonary process is evident.        MACRO:   None        Signed by: Corby Salinas 7/23/2024 10:10 PM   Dictation workstation:   [id number]            ED Course & Medical Decision Making     Medications   ipratropium-albuteroL (Duo-Neb) 0.5-2.5 mg/3 mL nebulizer solution 3 mL (3 mL nebulization Given 7/23/24 2123)   acetaminophen (Tylenol) tablet 975 mg (975 mg oral Given 7/23/24 2202)     Heart Rate:  [94]   Temperature:  [36.8 °C (98.2 °F)]   Respirations:  [20]   BP: (133)/(82)   Height:  [182.9 cm (6')]   Weight:  [120 kg (265 lb)]   Pulse Ox:  [100 %]    ED Course as of 07/23/24 2247 Tue Jul 23, 2024 2155 EKG performed 7/23/2024 at 9:52 PM.  Sinus rhythm with a  ventricular to 78 bpm, LA interval of 190 ms, QT/QTc of 392/447 ms.  No STEMI. [CJ]   2226 Updated the patient on the results.  Patient's workup did not show any acute abnormalities.  Patient was negative for COVID as well as flu and strep.  Patient's chest x-ray did not show any acute pneumonia.  EKG did not show any ischemic changes.  Patient was updated on the results.  Clinical discussed with the patient and I informed her that she potentially could test positive for COVID or flu or one of the other viral illnesses since going around symptoms only been going on for about 3 hours at this point.  At this time patient be discharged home with some symptomatic treatment.  Patient is in agreement with the plan of care of discharge at this time.  Patient has no signs of meningitis that would require further workup at this time. [CJ]      ED Course User Index  [CJ] Cade Menendez PA-C         Diagnoses as of 07/23/24 2247   Viral syndrome       Procedures   Procedures    Diagnosis     1. Viral syndrome        Disposition   Discharged    ED Prescriptions       Medication Sig Dispense Start Date End Date Auth. Provider    naproxen (Naprosyn) 500 mg tablet Take 1 tablet (500 mg) by mouth 2 times daily (morning and late afternoon) for 15 days. 30 tablet 7/23/2024 8/7/2024 Cade Menendez PA-C    benzonatate (Tessalon) 100 mg capsule Take 1 capsule (100 mg) by mouth every 8 hours for 7 days. Do not crush or chew. 21 capsule 7/23/2024 7/30/2024 Cade Menendez PA-C    ondansetron (Zofran) 4 mg tablet Take 1 tablet (4 mg) by mouth every 6 hours for 3 days. 12 tablet 7/23/2024 7/26/2024 Cade Menendez PA-C            Disclaimer: This note was dictated by speech recognition. Minor errors in transcription may be present. Please call if questions.       Cade Menendez PA-C  07/23/24 9985

## 2024-07-24 NOTE — ED TRIAGE NOTES
Pt to ED c/o flu like symptoms, started approx 30min PTA. Pt states had chills, sore throat, shortness of breath, and HA. Denies any sick contacts

## 2024-07-25 LAB
ATRIAL RATE: 78 BPM
P AXIS: 48 DEGREES
PR INTERVAL: 190 MS
Q ONSET: 249 MS
QRS COUNT: 12 BEATS
QRS DURATION: 97 MS
QT INTERVAL: 392 MS
QTC CALCULATION(BAZETT): 447 MS
QTC FREDERICIA: 427 MS
R AXIS: 48 DEGREES
T AXIS: 37 DEGREES
T OFFSET: 445 MS
VENTRICULAR RATE: 78 BPM

## 2024-07-27 LAB — ESTRONE SERPL-MCNC: 61.8 PG/ML (ref 9–36)

## 2024-08-04 DIAGNOSIS — K21.9 GASTROESOPHAGEAL REFLUX DISEASE WITHOUT ESOPHAGITIS: ICD-10-CM

## 2024-08-05 RX ORDER — FAMOTIDINE 40 MG/1
40 TABLET, FILM COATED ORAL 2 TIMES DAILY
Qty: 60 TABLET | Refills: 3 | Status: SHIPPED | OUTPATIENT
Start: 2024-08-05

## 2024-08-20 ENCOUNTER — APPOINTMENT (OUTPATIENT)
Dept: PRIMARY CARE | Facility: CLINIC | Age: 34
End: 2024-08-20
Payer: MEDICAID

## 2024-08-25 NOTE — PATIENT INSTRUCTIONS
Thank you for choosing [facility] for your healthcare.   As always if you have any questions or concerns please do not hesitate to call our office at [phone] or through [employer].    Have a great day!  Minda Veliz, DO

## 2024-08-26 ENCOUNTER — APPOINTMENT (OUTPATIENT)
Dept: PRIMARY CARE | Facility: CLINIC | Age: 34
End: 2024-08-26
Payer: MEDICAID

## 2024-08-26 VITALS
RESPIRATION RATE: 18 BRPM | DIASTOLIC BLOOD PRESSURE: 69 MMHG | WEIGHT: 257 LBS | OXYGEN SATURATION: 99 % | SYSTOLIC BLOOD PRESSURE: 105 MMHG | HEIGHT: 72 IN | HEART RATE: 78 BPM | TEMPERATURE: 96.9 F | BODY MASS INDEX: 34.81 KG/M2

## 2024-08-26 DIAGNOSIS — G89.29 CHRONIC LEFT-SIDED LOW BACK PAIN WITH LEFT-SIDED SCIATICA: Primary | ICD-10-CM

## 2024-08-26 DIAGNOSIS — F64.0 GENDER DYSPHORIA IN ADULT: ICD-10-CM

## 2024-08-26 DIAGNOSIS — M54.42 CHRONIC LEFT-SIDED LOW BACK PAIN WITH LEFT-SIDED SCIATICA: Primary | ICD-10-CM

## 2024-08-26 DIAGNOSIS — K64.8 INTERNAL HEMORRHOID: ICD-10-CM

## 2024-08-26 PROCEDURE — 1036F TOBACCO NON-USER: CPT | Performed by: FAMILY MEDICINE

## 2024-08-26 PROCEDURE — 99213 OFFICE O/P EST LOW 20 MIN: CPT | Performed by: FAMILY MEDICINE

## 2024-08-26 PROCEDURE — 3008F BODY MASS INDEX DOCD: CPT | Performed by: FAMILY MEDICINE

## 2024-08-26 RX ORDER — HYDROCORTISONE 25 MG/G
CREAM TOPICAL 2 TIMES DAILY
Qty: 30 G | Refills: 0 | Status: SHIPPED | OUTPATIENT
Start: 2024-08-26 | End: 2024-09-09

## 2024-08-26 RX ORDER — ARIPIPRAZOLE 20 MG/1
20 TABLET ORAL DAILY
Qty: 90 TABLET | Refills: 1 | Status: CANCELLED | OUTPATIENT
Start: 2024-08-26

## 2024-08-26 RX ORDER — TIZANIDINE 2 MG/1
2 TABLET ORAL 2 TIMES DAILY PRN
Qty: 180 TABLET | Refills: 1 | Status: SHIPPED | OUTPATIENT
Start: 2024-08-26 | End: 2025-02-22

## 2024-08-26 RX ORDER — ESTRADIOL 1.25 MG/1.25G
3.75 GEL TOPICAL DAILY
Qty: 112.5 G | Refills: 11 | Status: SHIPPED | OUTPATIENT
Start: 2024-08-26 | End: 2025-08-26

## 2024-08-26 SDOH — ECONOMIC STABILITY: FOOD INSECURITY: WITHIN THE PAST 12 MONTHS, THE FOOD YOU BOUGHT JUST DIDN'T LAST AND YOU DIDN'T HAVE MONEY TO GET MORE.: NEVER TRUE

## 2024-08-26 SDOH — ECONOMIC STABILITY: FOOD INSECURITY: WITHIN THE PAST 12 MONTHS, YOU WORRIED THAT YOUR FOOD WOULD RUN OUT BEFORE YOU GOT MONEY TO BUY MORE.: NEVER TRUE

## 2024-08-26 ASSESSMENT — LIFESTYLE VARIABLES
HOW OFTEN DO YOU HAVE SIX OR MORE DRINKS ON ONE OCCASION: NEVER
HOW OFTEN DO YOU HAVE A DRINK CONTAINING ALCOHOL: NEVER

## 2024-08-26 ASSESSMENT — PAIN SCALES - GENERAL: PAINLEVEL: 2

## 2024-08-26 NOTE — ASSESSMENT & PLAN NOTE
Improving with chiropractic care.  Orders:    tiZANidine (Zanaflex) 2 mg tablet; Take 1 tablet (2 mg) by mouth 2 times a day as needed for muscle spasms.

## 2024-08-26 NOTE — PROGRESS NOTES
Subjective   Patient ID: Jennifer Terrell is a 33 y.o. adult who presents for Follow-up (ER follow up 07/23/2024 Virus) and Back Pain.  She is concerned about blood on toilet paper when wiping for about the past week. Denies any rectal pain.     Back pain  She started seeing a chiropractor for her back, neck and shoulders and her pain has been improving.     Her disability application was denied. She is looking for a job.           Patient Care Team:  Minda Veliz DO as PCP - General  Nadia Orantes as Therapist (Psychology)  DAMION Macdonald-CNP as Nurse Practitioner (Gastroenterology)  Dipti Rosa MD MPH as Consulting Physician (Urology)  GIANNA Starsk as Nurse Practitioner (Psychiatry)    Current Outpatient Medications   Medication Sig Dispense Refill    ARIPiprazole (Abilify) 20 mg tablet Take 1 tablet (20 mg) by mouth once daily.      bicalutamide (Casodex) 50 mg tablet Take 1 tablet (50 mg total) by mouth once daily. 90 tablet 3    cetirizine (ZyrTEC) 10 mg tablet Take 1 tablet (10 mg) by mouth once daily. Allergy medication 90 tablet 3    famotidine (Pepcid) 40 mg tablet Take 1 tablet by mouth twice daily 60 tablet 3    lamoTRIgine (LaMICtal) 150 mg tablet Take 1 tablet (150 mg) by mouth 2 times a day.      levETIRAcetam (Keppra) 750 mg tablet Take 2 tablets (1,500 mg) by mouth every 12 hours.      multivitamin with minerals (multivitamin) tablet Take 1 tablet by mouth once daily.      prazosin (Minipress) 1 mg capsule Take 1 capsule (1 mg) by mouth once daily at bedtime. For nightmares      progesterone (Prometrium) 200 mg capsule Take 1 capsule (200 mg) by mouth once daily. 90 capsule 3    traZODone (Desyrel) 100 mg tablet Take 1 tablet (100 mg) by mouth once daily at bedtime.      zonisamide (Zonegran) 100 mg capsule Take 2 capsules (200 mg) by mouth once daily at bedtime.      estradioL 1.25 mg/1.25 gram (0.1 %) gel in packet Place 3.75 mg on the skin once daily. (3 packets) 112.5  g 11    hydrocortisone (Anusol-HC) 2.5 % rectal cream Insert into the rectum 2 times a day for 14 days. 30 g 0    tiZANidine (Zanaflex) 2 mg tablet Take 1 tablet (2 mg) by mouth 2 times a day as needed for muscle spasms. 180 tablet 1     No current facility-administered medications for this visit.       Objective     Visit Vitals  /69 (BP Location: Left arm, Patient Position: Sitting, BP Cuff Size: Adult)   Pulse 78   Temp 36.1 °C (96.9 °F) (Temporal)   Resp 18   Ht 1.829 m (6')   Wt 117 kg (257 lb)   SpO2 99%   BMI 34.86 kg/m²   Smoking Status Never   BSA 2.44 m²        Constitutional: Well nourished, well developed, appears stated age  Eyes: no scleral icterus, no conjunctival injection  Respiratory: normal respiratory effort  Musculoskeletal: No gross deformities appreciated  Skin: Warm, dry.  Neurologic: Alert, CNs II-XII grossly intact..  Psych: Appropriate mood and affect.  .        Assessment & Plan  Chronic left-sided low back pain with left-sided sciatica  Improving with chiropractic care.  Orders:    tiZANidine (Zanaflex) 2 mg tablet; Take 1 tablet (2 mg) by mouth 2 times a day as needed for muscle spasms.    Internal hemorrhoid    Orders:    hydrocortisone (Anusol-HC) 2.5 % rectal cream; Insert into the rectum 2 times a day for 14 days.    Gender dysphoria in adult  Increase estradiol to 3 packets daily  Continue progesterone and bicalutamide  Orders:    estradioL 1.25 mg/1.25 gram (0.1 %) gel in packet; Place 3.75 mg on the skin once daily. (3 packets)    Estradiol; Future           All pertinent lab work and results were reviewed with patient.     Follow up 3 months.     Minda Veliz DO

## 2024-08-26 NOTE — ASSESSMENT & PLAN NOTE
Increase estradiol to 3 packets daily  Continue progesterone and bicalutamide  Orders:    estradioL 1.25 mg/1.25 gram (0.1 %) gel in packet; Place 3.75 mg on the skin once daily. (3 packets)    Estradiol; Future

## 2024-09-11 ENCOUNTER — HOSPITAL ENCOUNTER (EMERGENCY)
Facility: HOSPITAL | Age: 34
Discharge: HOME | End: 2024-09-11
Attending: EMERGENCY MEDICINE
Payer: MEDICAID

## 2024-09-11 VITALS
SYSTOLIC BLOOD PRESSURE: 126 MMHG | TEMPERATURE: 98 F | DIASTOLIC BLOOD PRESSURE: 70 MMHG | RESPIRATION RATE: 16 BRPM | HEIGHT: 72 IN | BODY MASS INDEX: 34.54 KG/M2 | OXYGEN SATURATION: 98 % | WEIGHT: 255 LBS | HEART RATE: 70 BPM

## 2024-09-11 DIAGNOSIS — G43.909 MIGRAINE WITHOUT STATUS MIGRAINOSUS, NOT INTRACTABLE, UNSPECIFIED MIGRAINE TYPE: Primary | ICD-10-CM

## 2024-09-11 PROCEDURE — 99284 EMERGENCY DEPT VISIT MOD MDM: CPT | Mod: 25

## 2024-09-11 PROCEDURE — 96374 THER/PROPH/DIAG INJ IV PUSH: CPT

## 2024-09-11 PROCEDURE — 96375 TX/PRO/DX INJ NEW DRUG ADDON: CPT

## 2024-09-11 PROCEDURE — 2500000004 HC RX 250 GENERAL PHARMACY W/ HCPCS (ALT 636 FOR OP/ED): Performed by: EMERGENCY MEDICINE

## 2024-09-11 RX ORDER — DIPHENHYDRAMINE HYDROCHLORIDE 50 MG/ML
25 INJECTION INTRAMUSCULAR; INTRAVENOUS ONCE
Status: COMPLETED | OUTPATIENT
Start: 2024-09-11 | End: 2024-09-11

## 2024-09-11 RX ORDER — METOCLOPRAMIDE HYDROCHLORIDE 5 MG/ML
10 INJECTION INTRAMUSCULAR; INTRAVENOUS ONCE
Status: COMPLETED | OUTPATIENT
Start: 2024-09-11 | End: 2024-09-11

## 2024-09-11 ASSESSMENT — COLUMBIA-SUICIDE SEVERITY RATING SCALE - C-SSRS
1. IN THE PAST MONTH, HAVE YOU WISHED YOU WERE DEAD OR WISHED YOU COULD GO TO SLEEP AND NOT WAKE UP?: NO
6. HAVE YOU EVER DONE ANYTHING, STARTED TO DO ANYTHING, OR PREPARED TO DO ANYTHING TO END YOUR LIFE?: NO
2. HAVE YOU ACTUALLY HAD ANY THOUGHTS OF KILLING YOURSELF?: NO

## 2024-09-11 ASSESSMENT — PAIN SCALES - GENERAL
PAINLEVEL_OUTOF10: 4
PAINLEVEL_OUTOF10: 10 - WORST POSSIBLE PAIN

## 2024-09-11 ASSESSMENT — PAIN - FUNCTIONAL ASSESSMENT
PAIN_FUNCTIONAL_ASSESSMENT: 0-10
PAIN_FUNCTIONAL_ASSESSMENT: 0-10

## 2024-09-11 NOTE — ED PROVIDER NOTES
"HPI   No chief complaint on file.      Patient presents with a migraine headache.  She has been having this for 2 days.  She describes a throbbing headache and feels like something is trying to \"crawl out from my head.\"  She has had some photophobia associated with this.  Nausea but no vomiting she does have a history of migraine headaches.  She has been trying Aleve at home without any relief.  No lateralizing weakness.  She denies any recent fevers.  No neck pain.                          No data recorded                Patient History   Past Medical History:   Diagnosis Date   • ADHD (attention deficit hyperactivity disorder)    • Anxiety    • Chondromalacia patellae, right knee 10/24/2019    Chondromalacia of right patella   • Chronic low back pain 04/13/2023   • Depression    • Dorsalgia, unspecified 06/24/2020    Acute back pain   • Flushing 01/06/2021    Flushing   • Generalized abdominal pain 11/02/2021    Abdominal pain, generalized   • Local infection of the skin and subcutaneous tissue, unspecified 10/06/2020    Skin infection, bacterial   • Nausea 03/10/2021    Nausea in adult   • Other forms of dyspnea 03/08/2018    Dyspnea on exertion   • Other hemorrhoids 07/15/2021    Internal hemorrhoids   • Other skin changes 07/08/2021    Skin irritation   • Otitis media, unspecified, left ear 05/01/2020    Acute otitis media, left   • Personal history of COVID-19     Personal history of COVID-19   • Personal history of diseases of the skin and subcutaneous tissue 03/10/2021    History of dermatitis   • Personal history of other diseases of the respiratory system 10/06/2021    History of sore throat   • Personal history of other diseases of the respiratory system 10/06/2021    History of laryngitis   • Personal history of other specified conditions 08/17/2021    History of dysphagia   • Personal history of other specified conditions 05/03/2018    History of palpitations   • Personal history of other specified " conditions 05/03/2018    History of exertional chest pain   • Personal history of other specified conditions 05/03/2018    History of syncope   • Seasonal asthma ([facility])      Past Surgical History:   Procedure Laterality Date   • INNER EAR SURGERY  03/08/2018    Inner Ear Surgery   • OTHER SURGICAL HISTORY  04/05/2022    Oral surgery   • OTHER SURGICAL HISTORY  08/06/2021    Esophagogastroduodenoscopy   • OTHER SURGICAL HISTORY  08/06/2021    Colonoscopy   • TONSILLECTOMY  03/08/2018    Tonsillectomy     Family History   Problem Relation Name Age of Onset   • Other (cva) Other     • Epilepsy Other     • Ulcerative colitis Other     • Hypertension Other       Social History     Tobacco Use   • Smoking status: Never     Passive exposure: Never   • Smokeless tobacco: Never   Vaping Use   • Vaping status: Never Used   Substance Use Topics   • Alcohol use: Not Currently     Comment: very rare   • Drug use: Yes     Types: Marijuana     Comment: ocassional edibles       Physical Exam   ED Triage Vitals   Temp Pulse Resp BP   -- -- -- --      SpO2 Temp src Heart Rate Source Patient Position   -- -- -- --      BP Location FiO2 (%)     -- --       Physical Exam  Vitals and nursing note reviewed.   Constitutional:       Appearance: Normal appearance.   HENT:      Head: Normocephalic and atraumatic.      Mouth/Throat:      Mouth: Mucous membranes are moist.   Eyes:      Extraocular Movements: Extraocular movements intact.      Pupils: Pupils are equal, round, and reactive to light.   Cardiovascular:      Rate and Rhythm: Normal rate and regular rhythm.      Heart sounds: No murmur heard.  Pulmonary:      Effort: Pulmonary effort is normal. No respiratory distress.      Breath sounds: Normal breath sounds.   Abdominal:      General: There is no distension.      Palpations: Abdomen is soft.      Tenderness: There is no abdominal tenderness.   Musculoskeletal:         General: No tenderness or deformity. Normal range of motion.       Cervical back: Neck supple.      Right lower leg: No edema.      Left lower leg: No edema.   Skin:     General: Skin is warm and dry.      Findings: No lesion or rash.   Neurological:      General: No focal deficit present.      Mental Status: She is alert and oriented to person, place, and time.      Sensory: No sensory deficit.      Motor: No weakness.   Psychiatric:         Behavior: Behavior normal.       Labs Reviewed - No data to display  No orders to display     ED Course & MDM   Diagnoses as of 09/11/24 0100   Migraine without status migrainosus, not intractable, unspecified migraine type       Medical Decision Making  Differentials include migraine headache, tension headache. Imaging studies, if performed, were independently reviewed and interpreted by myself and confirmed by radiologist. EKG(s), if performed, were interpreted by myself.  The patient has no red flag symptoms.  She has no neurodeficits.  She states that she does have a history of migraines.  It is not the worst headache of her life.  I do not feel that this requires any imaging studies.The patient will be given Reglan and Benadryl.  She will be reevaluated by the oncoming physician.        Procedure  Procedures     Leighton Boyer MD  09/11/24 0100

## 2024-09-29 NOTE — ED PROVIDER NOTES
The patient's case was signed out to me by the outgoing physician.  The patient was given Reglan and Benadryl after this, the patient had improvement of her symptoms and as result and comfortable the patient being discharged home.  The patient was instructed to follow-up with a primary care physician as needed.  She expressed understanding and agreement the patient was then discharged home in otherwise stable condition.     Mukund Colby MD  09/29/24 0429

## 2024-09-30 ENCOUNTER — LAB (OUTPATIENT)
Dept: LAB | Facility: LAB | Age: 34
End: 2024-09-30
Payer: MEDICAID

## 2024-09-30 DIAGNOSIS — F64.0 GENDER DYSPHORIA IN ADULT: ICD-10-CM

## 2024-09-30 DIAGNOSIS — Z79.899 OTHER LONG TERM (CURRENT) DRUG THERAPY: Primary | ICD-10-CM

## 2024-09-30 LAB
ALBUMIN SERPL BCP-MCNC: 4.1 G/DL (ref 3.4–5)
ALP SERPL-CCNC: 62 U/L (ref 33–120)
ALT SERPL W P-5'-P-CCNC: 18 U/L (ref 7–52)
ANION GAP SERPL CALC-SCNC: 14 MMOL/L (ref 10–20)
AST SERPL W P-5'-P-CCNC: 14 U/L (ref 9–39)
BASOPHILS # BLD AUTO: 0.03 X10*3/UL (ref 0–0.1)
BASOPHILS NFR BLD AUTO: 0.4 %
BILIRUB SERPL-MCNC: 0.6 MG/DL (ref 0–1.2)
BUN SERPL-MCNC: 10 MG/DL (ref 6–23)
CALCIUM SERPL-MCNC: 8.5 MG/DL (ref 8.6–10.3)
CHLORIDE SERPL-SCNC: 105 MMOL/L (ref 98–107)
CHOLEST SERPL-MCNC: 131 MG/DL (ref 0–199)
CHOLESTEROL/HDL RATIO: 3.4
CO2 SERPL-SCNC: 22 MMOL/L (ref 21–32)
CREAT SERPL-MCNC: 1.09 MG/DL (ref 0.5–1.3)
EGFRCR SERPLBLD CKD-EPI 2021: 69 ML/MIN/1.73M*2
EOSINOPHIL # BLD AUTO: 0.1 X10*3/UL (ref 0–0.7)
EOSINOPHIL NFR BLD AUTO: 1.4 %
ERYTHROCYTE [DISTWIDTH] IN BLOOD BY AUTOMATED COUNT: 13 % (ref 11.5–14.5)
EST. AVERAGE GLUCOSE BLD GHB EST-MCNC: 94 MG/DL
ESTRADIOL SERPL-MCNC: 293 PG/ML
GLUCOSE SERPL-MCNC: 80 MG/DL (ref 74–99)
HBA1C MFR BLD: 4.9 %
HCT VFR BLD AUTO: 41.9 % (ref 36–52)
HDLC SERPL-MCNC: 38.8 MG/DL
HGB BLD-MCNC: 13.8 G/DL (ref 12–17.5)
IMM GRANULOCYTES # BLD AUTO: 0.02 X10*3/UL (ref 0–0.7)
IMM GRANULOCYTES NFR BLD AUTO: 0.3 % (ref 0–0.9)
LDLC SERPL CALC-MCNC: 72 MG/DL
LYMPHOCYTES # BLD AUTO: 2.06 X10*3/UL (ref 1.2–4.8)
LYMPHOCYTES NFR BLD AUTO: 28.2 %
MCH RBC QN AUTO: 29.6 PG (ref 26–34)
MCHC RBC AUTO-ENTMCNC: 32.9 G/DL (ref 32–36)
MCV RBC AUTO: 90 FL (ref 80–100)
MONOCYTES # BLD AUTO: 0.56 X10*3/UL (ref 0.1–1)
MONOCYTES NFR BLD AUTO: 7.7 %
NEUTROPHILS # BLD AUTO: 4.54 X10*3/UL (ref 1.2–7.7)
NEUTROPHILS NFR BLD AUTO: 62 %
NON HDL CHOLESTEROL: 92 MG/DL (ref 0–149)
NRBC BLD-RTO: 0 /100 WBCS (ref 0–0)
PLATELET # BLD AUTO: 208 X10*3/UL (ref 150–450)
POTASSIUM SERPL-SCNC: 3.7 MMOL/L (ref 3.5–5.3)
PROT SERPL-MCNC: 6.3 G/DL (ref 6.4–8.2)
RBC # BLD AUTO: 4.66 X10*6/UL (ref 4–5.9)
SODIUM SERPL-SCNC: 137 MMOL/L (ref 136–145)
TRIGL SERPL-MCNC: 103 MG/DL (ref 0–149)
VLDL: 21 MG/DL (ref 0–40)
WBC # BLD AUTO: 7.3 X10*3/UL (ref 4.4–11.3)

## 2024-09-30 PROCEDURE — 83036 HEMOGLOBIN GLYCOSYLATED A1C: CPT

## 2024-09-30 PROCEDURE — 82670 ASSAY OF TOTAL ESTRADIOL: CPT

## 2024-09-30 PROCEDURE — 36415 COLL VENOUS BLD VENIPUNCTURE: CPT

## 2024-09-30 PROCEDURE — 85025 COMPLETE CBC W/AUTO DIFF WBC: CPT

## 2024-09-30 PROCEDURE — 80053 COMPREHEN METABOLIC PANEL: CPT

## 2024-09-30 PROCEDURE — 80061 LIPID PANEL: CPT

## 2024-10-10 ENCOUNTER — HOSPITAL ENCOUNTER (EMERGENCY)
Facility: HOSPITAL | Age: 34
Discharge: HOME | End: 2024-10-10
Payer: MEDICAID

## 2024-10-10 VITALS
BODY MASS INDEX: 35.21 KG/M2 | DIASTOLIC BLOOD PRESSURE: 72 MMHG | SYSTOLIC BLOOD PRESSURE: 108 MMHG | HEIGHT: 72 IN | TEMPERATURE: 97.3 F | HEART RATE: 58 BPM | RESPIRATION RATE: 18 BRPM | OXYGEN SATURATION: 99 % | WEIGHT: 260 LBS

## 2024-10-10 DIAGNOSIS — R31.9 HEMATURIA, UNSPECIFIED TYPE: Primary | ICD-10-CM

## 2024-10-10 LAB
APPEARANCE UR: CLEAR
BILIRUB UR STRIP.AUTO-MCNC: NEGATIVE MG/DL
COLOR UR: YELLOW
GLUCOSE UR STRIP.AUTO-MCNC: NORMAL MG/DL
HCG UR QL IA.RAPID: NEGATIVE
KETONES UR STRIP.AUTO-MCNC: NEGATIVE MG/DL
LEUKOCYTE ESTERASE UR QL STRIP.AUTO: NEGATIVE
MUCOUS THREADS #/AREA URNS AUTO: ABNORMAL /LPF
NITRITE UR QL STRIP.AUTO: NEGATIVE
PH UR STRIP.AUTO: 6.5 [PH]
PROT UR STRIP.AUTO-MCNC: NEGATIVE MG/DL
RBC # UR STRIP.AUTO: ABNORMAL /UL
RBC #/AREA URNS AUTO: >20 /HPF
SP GR UR STRIP.AUTO: 1.02
SQUAMOUS #/AREA URNS AUTO: ABNORMAL /HPF
UROBILINOGEN UR STRIP.AUTO-MCNC: NORMAL MG/DL
WBC #/AREA URNS AUTO: ABNORMAL /HPF

## 2024-10-10 PROCEDURE — 99283 EMERGENCY DEPT VISIT LOW MDM: CPT

## 2024-10-10 PROCEDURE — 81025 URINE PREGNANCY TEST: CPT | Performed by: NURSE PRACTITIONER

## 2024-10-10 PROCEDURE — 81003 URINALYSIS AUTO W/O SCOPE: CPT | Performed by: NURSE PRACTITIONER

## 2024-10-10 ASSESSMENT — PAIN SCALES - GENERAL: PAINLEVEL_OUTOF10: 1

## 2024-10-10 ASSESSMENT — LIFESTYLE VARIABLES
EVER HAD A DRINK FIRST THING IN THE MORNING TO STEADY YOUR NERVES TO GET RID OF A HANGOVER: NO
TOTAL SCORE: 0
EVER FELT BAD OR GUILTY ABOUT YOUR DRINKING: NO
HAVE PEOPLE ANNOYED YOU BY CRITICIZING YOUR DRINKING: NO
HAVE YOU EVER FELT YOU SHOULD CUT DOWN ON YOUR DRINKING: NO

## 2024-10-10 ASSESSMENT — COLUMBIA-SUICIDE SEVERITY RATING SCALE - C-SSRS
6. HAVE YOU EVER DONE ANYTHING, STARTED TO DO ANYTHING, OR PREPARED TO DO ANYTHING TO END YOUR LIFE?: NO
2. HAVE YOU ACTUALLY HAD ANY THOUGHTS OF KILLING YOURSELF?: NO
1. IN THE PAST MONTH, HAVE YOU WISHED YOU WERE DEAD OR WISHED YOU COULD GO TO SLEEP AND NOT WAKE UP?: NO

## 2024-10-10 ASSESSMENT — PAIN DESCRIPTION - DESCRIPTORS: DESCRIPTORS: SHARP

## 2024-10-10 ASSESSMENT — PAIN - FUNCTIONAL ASSESSMENT: PAIN_FUNCTIONAL_ASSESSMENT: 0-10

## 2024-10-10 NOTE — ED PROVIDER NOTES
Chief Complaint   Patient presents with   • Blood in Urine       HPI       34 year old female presents to the Emergency Department today complaining of a 2 month history of hematuria that worsened this am. Denies any associated fever, chills, headache, neck pain, chest pain, shortness of breath, abdominal pain, nausea, vomiting, diarrhea, constipation, or urinary symptoms.       History provided by:  Patient             Patient History   Past Medical History:   Diagnosis Date   • ADHD (attention deficit hyperactivity disorder)    • Anxiety    • Chondromalacia patellae, right knee 10/24/2019    Chondromalacia of right patella   • Chronic low back pain 04/13/2023   • Depression    • Dorsalgia, unspecified 06/24/2020    Acute back pain   • Flushing 01/06/2021    Flushing   • Generalized abdominal pain 11/02/2021    Abdominal pain, generalized   • Local infection of the skin and subcutaneous tissue, unspecified 10/06/2020    Skin infection, bacterial   • Nausea 03/10/2021    Nausea in adult   • Other forms of dyspnea 03/08/2018    Dyspnea on exertion   • Other hemorrhoids 07/15/2021    Internal hemorrhoids   • Other skin changes 07/08/2021    Skin irritation   • Otitis media, unspecified, left ear 05/01/2020    Acute otitis media, left   • Personal history of COVID-19     Personal history of COVID-19   • Personal history of diseases of the skin and subcutaneous tissue 03/10/2021    History of dermatitis   • Personal history of other diseases of the respiratory system 10/06/2021    History of sore throat   • Personal history of other diseases of the respiratory system 10/06/2021    History of laryngitis   • Personal history of other specified conditions 08/17/2021    History of dysphagia   • Personal history of other specified conditions 05/03/2018    History of palpitations   • Personal history of other specified conditions 05/03/2018    History of exertional chest pain   • Personal history of other specified conditions  05/03/2018    History of syncope   • Seasonal asthma ([facility])      Past Surgical History:   Procedure Laterality Date   • INNER EAR SURGERY  03/08/2018    Inner Ear Surgery   • OTHER SURGICAL HISTORY  04/05/2022    Oral surgery   • OTHER SURGICAL HISTORY  08/06/2021    Esophagogastroduodenoscopy   • OTHER SURGICAL HISTORY  08/06/2021    Colonoscopy   • TONSILLECTOMY  03/08/2018    Tonsillectomy     Family History   Problem Relation Name Age of Onset   • Other (cva) Other     • Epilepsy Other     • Ulcerative colitis Other     • Hypertension Other       Social History     Tobacco Use   • Smoking status: Never     Passive exposure: Never   • Smokeless tobacco: Never   Vaping Use   • Vaping status: Never Used   Substance Use Topics   • Alcohol use: Not Currently     Comment: very rare   • Drug use: Yes     Types: Marijuana     Comment: ocassional edibles           Physical Exam  Constitutional:       General: She is awake.      Appearance: Normal appearance.   Cardiovascular:      Rate and Rhythm: Normal rate and regular rhythm.      Pulses:           Radial pulses are 3+ on the right side and 3+ on the left side.      Heart sounds: Normal heart sounds. No murmur heard.     No friction rub. No gallop.   Pulmonary:      Effort: Pulmonary effort is normal.      Breath sounds: Normal breath sounds and air entry.   Abdominal:      General: Abdomen is flat.      Palpations: Abdomen is soft.      Tenderness: There is no abdominal tenderness. There is no right CVA tenderness or left CVA tenderness.   Neurological:      Mental Status: She is alert.   Psychiatric:         Behavior: Behavior is cooperative.         Labs Reviewed   URINALYSIS WITH REFLEX CULTURE AND MICROSCOPIC - Abnormal       Result Value    Color, Urine Yellow      Appearance, Urine Clear      Specific Gravity, Urine 1.022      pH, Urine 6.5      Protein, Urine NEGATIVE      Glucose, Urine Normal      Blood, Urine 0.2 (2+) (*)     Ketones, Urine NEGATIVE       Bilirubin, Urine NEGATIVE      Urobilinogen, Urine Normal      Nitrite, Urine NEGATIVE      Leukocyte Esterase, Urine NEGATIVE     URINALYSIS MICROSCOPIC WITH REFLEX CULTURE - Abnormal    WBC, Urine 1-5      RBC, Urine >20 (*)     Squamous Epithelial Cells, Urine 10-25 (FEW)      Mucus, Urine 1+     HCG, URINE, QUALITATIVE - Normal    HCG, Urine NEGATIVE     URINALYSIS WITH REFLEX CULTURE AND MICROSCOPIC    Narrative:     The following orders were created for panel order Urinalysis with Reflex Culture and Microscopic.  Procedure                               Abnormality         Status                     ---------                               -----------         ------                     Urinalysis with Reflex C...[[id number]]  Abnormal            Final result               Extra Urine Gray Tube[[id number]]                            In process                   Please view results for these tests on the individual orders.   EXTRA URINE GRAY TUBE       No orders to display            ED Course & MDM   Diagnoses as of 10/10/24 1600   Hematuria, unspecified type           Medical Decision Making  Patient was seen and evaluated by myself. Urinalysis was positive for blood, but there are no signs of infection noted. Repeat abdominal evaluation reveals an abdomen soft, nondistended, and nontender to palpation. There was no rebound, rigidity, or guarding noted. There were no peritoneal signs noted. Continued to have multiple benign serial abdominal exams. At this time, we find no underlying evidence of acute pancreatitis, cholecystitis, cholangitis, diverticulitis, or appendicitis. No flank pain making kidney stone less likely. No evidence of pyelonephritis as well. I am unclear as to the direct etiology of her hematuria. Given a referral to urology for follow up. Return if worse in any way. Discharged in stable condition with computer instructions.     Diagnostic Impression:    1. Acute hematuria           Your  medication list        ASK your doctor about these medications        Instructions Last Dose Given Next Dose Due   ARIPiprazole 20 mg tablet  Commonly known as: Abilify           bicalutamide 50 mg tablet  Commonly known as: Casodex      Take 1 tablet (50 mg total) by mouth once daily.       cetirizine 10 mg tablet  Commonly known as: ZyrTEC      Take 1 tablet (10 mg) by mouth once daily. Allergy medication       estradioL 1.25 mg/1.25 gram (0.1 %) gel in packet      Place 3.75 mg on the skin once daily. (3 packets)       famotidine 40 mg tablet  Commonly known as: Pepcid      Take 1 tablet by mouth twice daily       hydrocortisone 2.5 % rectal cream  Commonly known as: Anusol-HC      Insert into the rectum 2 times a day for 14 days.       lamoTRIgine 150 mg tablet  Commonly known as: LaMICtal           levETIRAcetam 750 mg tablet  Commonly known as: Keppra           multivitamin with minerals tablet           prazosin 1 mg capsule  Commonly known as: Minipress           progesterone 200 mg capsule  Commonly known as: Prometrium      Take 1 capsule (200 mg) by mouth once daily.       tiZANidine 2 mg tablet  Commonly known as: Zanaflex      Take 1 tablet (2 mg) by mouth 2 times a day as needed for muscle spasms.       traZODone 100 mg tablet  Commonly known as: Desyrel           zonisamide 100 mg capsule  Commonly known as: Zonegran                      Procedure  Procedures     DAMION Carrasquillo-CNP  10/10/24 1600

## 2024-10-11 LAB — HOLD SPECIMEN: NORMAL

## 2024-10-13 ENCOUNTER — HOSPITAL ENCOUNTER (EMERGENCY)
Facility: HOSPITAL | Age: 34
Discharge: HOME | End: 2024-10-13
Attending: STUDENT IN AN ORGANIZED HEALTH CARE EDUCATION/TRAINING PROGRAM
Payer: MEDICAID

## 2024-10-13 ENCOUNTER — APPOINTMENT (OUTPATIENT)
Dept: RADIOLOGY | Facility: HOSPITAL | Age: 34
End: 2024-10-13
Payer: MEDICAID

## 2024-10-13 VITALS
OXYGEN SATURATION: 100 % | RESPIRATION RATE: 16 BRPM | TEMPERATURE: 96.9 F | HEIGHT: 72 IN | BODY MASS INDEX: 35.21 KG/M2 | SYSTOLIC BLOOD PRESSURE: 120 MMHG | HEART RATE: 65 BPM | WEIGHT: 260 LBS | DIASTOLIC BLOOD PRESSURE: 75 MMHG

## 2024-10-13 DIAGNOSIS — R31.9 HEMATURIA, UNSPECIFIED TYPE: Primary | ICD-10-CM

## 2024-10-13 LAB
ALBUMIN SERPL BCP-MCNC: 4.1 G/DL (ref 3.4–5)
ALP SERPL-CCNC: 64 U/L (ref 33–120)
ALT SERPL W P-5'-P-CCNC: 18 U/L (ref 7–52)
ANION GAP SERPL CALC-SCNC: 12 MMOL/L (ref 10–20)
APPEARANCE UR: ABNORMAL
AST SERPL W P-5'-P-CCNC: 14 U/L (ref 9–39)
BASOPHILS # BLD AUTO: 0.04 X10*3/UL (ref 0–0.1)
BASOPHILS NFR BLD AUTO: 0.6 %
BILIRUB SERPL-MCNC: 0.4 MG/DL (ref 0–1.2)
BILIRUB UR STRIP.AUTO-MCNC: NEGATIVE MG/DL
BUN SERPL-MCNC: 17 MG/DL (ref 6–23)
CALCIUM SERPL-MCNC: 9.1 MG/DL (ref 8.6–10.3)
CHLORIDE SERPL-SCNC: 108 MMOL/L (ref 98–107)
CO2 SERPL-SCNC: 22 MMOL/L (ref 21–32)
COLOR UR: YELLOW
CREAT SERPL-MCNC: 1.18 MG/DL (ref 0.5–1.3)
EGFRCR SERPLBLD CKD-EPI 2021: 62 ML/MIN/1.73M*2
EOSINOPHIL # BLD AUTO: 0.09 X10*3/UL (ref 0–0.7)
EOSINOPHIL NFR BLD AUTO: 1.3 %
ERYTHROCYTE [DISTWIDTH] IN BLOOD BY AUTOMATED COUNT: 12.7 % (ref 11.5–14.5)
GLUCOSE SERPL-MCNC: 93 MG/DL (ref 74–99)
GLUCOSE UR STRIP.AUTO-MCNC: NEGATIVE MG/DL
HCT VFR BLD AUTO: 40.2 % (ref 36–52)
HGB BLD-MCNC: 13.9 G/DL (ref 12–17.5)
IMM GRANULOCYTES # BLD AUTO: 0.02 X10*3/UL (ref 0–0.7)
IMM GRANULOCYTES NFR BLD AUTO: 0.3 % (ref 0–0.9)
KETONES UR STRIP.AUTO-MCNC: NEGATIVE MG/DL
LACTATE SERPL-SCNC: 1.1 MMOL/L (ref 0.4–2)
LEUKOCYTE ESTERASE UR QL STRIP.AUTO: NEGATIVE
LYMPHOCYTES # BLD AUTO: 1.84 X10*3/UL (ref 1.2–4.8)
LYMPHOCYTES NFR BLD AUTO: 27.2 %
MCH RBC QN AUTO: 30.1 PG (ref 26–34)
MCHC RBC AUTO-ENTMCNC: 34.6 G/DL (ref 32–36)
MCV RBC AUTO: 87 FL (ref 80–100)
MONOCYTES # BLD AUTO: 0.48 X10*3/UL (ref 0.1–1)
MONOCYTES NFR BLD AUTO: 7.1 %
MUCOUS THREADS #/AREA URNS AUTO: ABNORMAL /LPF
NEUTROPHILS # BLD AUTO: 4.3 X10*3/UL (ref 1.2–7.7)
NEUTROPHILS NFR BLD AUTO: 63.5 %
NITRITE UR QL STRIP.AUTO: NEGATIVE
NRBC BLD-RTO: 0 /100 WBCS (ref 0–0)
PH UR STRIP.AUTO: 5.5 [PH]
PLATELET # BLD AUTO: 199 X10*3/UL (ref 150–450)
POTASSIUM SERPL-SCNC: 3.9 MMOL/L (ref 3.5–5.3)
PROT SERPL-MCNC: 6.6 G/DL (ref 6.4–8.2)
PROT UR STRIP.AUTO-MCNC: NEGATIVE MG/DL
RBC # BLD AUTO: 4.62 X10*6/UL (ref 4–5.9)
RBC # UR STRIP.AUTO: ABNORMAL /UL
RBC #/AREA URNS AUTO: >20 /HPF
SODIUM SERPL-SCNC: 138 MMOL/L (ref 136–145)
SP GR UR STRIP.AUTO: >1.03
SQUAMOUS #/AREA URNS AUTO: ABNORMAL /HPF
UROBILINOGEN UR STRIP.AUTO-MCNC: ABNORMAL MG/DL
WBC # BLD AUTO: 6.8 X10*3/UL (ref 4.4–11.3)
WBC #/AREA URNS AUTO: ABNORMAL /HPF

## 2024-10-13 PROCEDURE — 80053 COMPREHEN METABOLIC PANEL: CPT | Performed by: NURSE PRACTITIONER

## 2024-10-13 PROCEDURE — 36415 COLL VENOUS BLD VENIPUNCTURE: CPT | Performed by: NURSE PRACTITIONER

## 2024-10-13 PROCEDURE — 83605 ASSAY OF LACTIC ACID: CPT | Performed by: NURSE PRACTITIONER

## 2024-10-13 PROCEDURE — 74176 CT ABD & PELVIS W/O CONTRAST: CPT | Mod: FOREIGN READ | Performed by: RADIOLOGY

## 2024-10-13 PROCEDURE — 99284 EMERGENCY DEPT VISIT MOD MDM: CPT | Mod: 25

## 2024-10-13 PROCEDURE — 74176 CT ABD & PELVIS W/O CONTRAST: CPT

## 2024-10-13 PROCEDURE — 81001 URINALYSIS AUTO W/SCOPE: CPT | Performed by: NURSE PRACTITIONER

## 2024-10-13 PROCEDURE — 85025 COMPLETE CBC W/AUTO DIFF WBC: CPT | Performed by: NURSE PRACTITIONER

## 2024-10-13 ASSESSMENT — PAIN SCALES - GENERAL: PAINLEVEL_OUTOF10: 3

## 2024-10-13 ASSESSMENT — LIFESTYLE VARIABLES
TOTAL SCORE: 0
EVER HAD A DRINK FIRST THING IN THE MORNING TO STEADY YOUR NERVES TO GET RID OF A HANGOVER: NO
EVER FELT BAD OR GUILTY ABOUT YOUR DRINKING: NO
HAVE PEOPLE ANNOYED YOU BY CRITICIZING YOUR DRINKING: NO
HAVE YOU EVER FELT YOU SHOULD CUT DOWN ON YOUR DRINKING: NO

## 2024-10-13 ASSESSMENT — PAIN DESCRIPTION - PAIN TYPE: TYPE: ACUTE PAIN

## 2024-10-13 ASSESSMENT — PAIN - FUNCTIONAL ASSESSMENT: PAIN_FUNCTIONAL_ASSESSMENT: 0-10

## 2024-10-13 ASSESSMENT — PAIN DESCRIPTION - LOCATION: LOCATION: BACK

## 2024-10-13 NOTE — ED PROVIDER NOTES
HPI   Chief Complaint   Patient presents with    Blood in Urine     C/O blood in urine       34-year-old transgender female presents to the emergency department today for hematuria.  Patient states that she has been having blood in her urine intermittently for the past 2 months.  Did see an urgent care 3 weeks ago as she was having some dysuria associated with that it was noted to be negative.  States 4 days ago she noticed hematuria again and came to the emergency department.  A urinalysis was performed and they educated her to follow-up outpatient with urology on Monday.  Patient states last night she had another round of hematuria and is now having some flank discomfort.  No dysuria, urinary frequency or hesitancy.  Denies any blood thinner use.  No over-the-counter pain medication use.                          No data recorded                Patient History   Past Medical History:   Diagnosis Date    ADHD (attention deficit hyperactivity disorder)     Anxiety     Chondromalacia patellae, right knee 10/24/2019    Chondromalacia of right patella    Chronic low back pain 04/13/2023    Depression     Dorsalgia, unspecified 06/24/2020    Acute back pain    Flushing 01/06/2021    Flushing    Generalized abdominal pain 11/02/2021    Abdominal pain, generalized    Local infection of the skin and subcutaneous tissue, unspecified 10/06/2020    Skin infection, bacterial    Nausea 03/10/2021    Nausea in adult    Other forms of dyspnea 03/08/2018    Dyspnea on exertion    Other hemorrhoids 07/15/2021    Internal hemorrhoids    Other skin changes 07/08/2021    Skin irritation    Otitis media, unspecified, left ear 05/01/2020    Acute otitis media, left    Personal history of COVID-19     Personal history of COVID-19    Personal history of diseases of the skin and subcutaneous tissue 03/10/2021    History of dermatitis    Personal history of other diseases of the respiratory system 10/06/2021    History of sore throat     Personal history of other diseases of the respiratory system 10/06/2021    History of laryngitis    Personal history of other specified conditions 08/17/2021    History of dysphagia    Personal history of other specified conditions 05/03/2018    History of palpitations    Personal history of other specified conditions 05/03/2018    History of exertional chest pain    Personal history of other specified conditions 05/03/2018    History of syncope    Seasonal asthma ([facility])      Past Surgical History:   Procedure Laterality Date    INNER EAR SURGERY  03/08/2018    Inner Ear Surgery    OTHER SURGICAL HISTORY  04/05/2022    Oral surgery    OTHER SURGICAL HISTORY  08/06/2021    Esophagogastroduodenoscopy    OTHER SURGICAL HISTORY  08/06/2021    Colonoscopy    TONSILLECTOMY  03/08/2018    Tonsillectomy     Family History   Problem Relation Name Age of Onset    Other (cva) Other      Epilepsy Other      Ulcerative colitis Other      Hypertension Other       Social History     Tobacco Use    Smoking status: Never     Passive exposure: Never    Smokeless tobacco: Never   Vaping Use    Vaping status: Never Used   Substance Use Topics    Alcohol use: Not Currently     Comment: very rare    Drug use: Yes     Types: Marijuana     Comment: ocassional edibles       Physical Exam   ED Triage Vitals [10/13/24 1243]   Temperature Heart Rate Respirations BP   35.9 °C (96.6 °F) 70 20 135/75      Pulse Ox Temp Source Heart Rate Source Patient Position   96 % Tympanic -- Sitting      BP Location FiO2 (%)     Left arm --       Physical Exam  Vitals and nursing note reviewed.   Constitutional:       General: She is not in acute distress.     Appearance: Normal appearance. She is not toxic-appearing.   HENT:      Right Ear: Tympanic membrane normal.      Left Ear: Tympanic membrane normal.      Mouth/Throat:      Mouth: Mucous membranes are moist.      Pharynx: Oropharynx is clear.   Eyes:      Extraocular Movements: Extraocular movements  intact.      Pupils: Pupils are equal, round, and reactive to light.   Cardiovascular:      Rate and Rhythm: Normal rate and regular rhythm.      Pulses: Normal pulses.      Heart sounds: Normal heart sounds.   Pulmonary:      Effort: Pulmonary effort is normal.      Breath sounds: Normal breath sounds.   Abdominal:      General: Abdomen is flat. Bowel sounds are normal.      Palpations: Abdomen is soft.      Tenderness: There is no abdominal tenderness. There is right CVA tenderness and left CVA tenderness.   Musculoskeletal:         General: Normal range of motion.      Cervical back: Normal range of motion and neck supple.   Skin:     General: Skin is warm and dry.      Capillary Refill: Capillary refill takes less than 2 seconds.   Neurological:      General: No focal deficit present.      Mental Status: She is alert and oriented to person, place, and time.   Psychiatric:         Mood and Affect: Mood normal.         Behavior: Behavior normal.         Judgment: Judgment normal.         Labs Reviewed   COMPREHENSIVE METABOLIC PANEL - Abnormal       Result Value    Glucose 93      Sodium 138      Potassium 3.9      Chloride 108 (*)     Bicarbonate 22      Anion Gap 12      Urea Nitrogen 17      Creatinine 1.18      eGFR 62      Calcium 9.1      Albumin 4.1      Alkaline Phosphatase 64      Total Protein 6.6      AST 14      Bilirubin, Total 0.4      ALT 18     URINALYSIS WITH REFLEX CULTURE AND MICROSCOPIC - Abnormal    Color, Urine Yellow      Appearance, Urine Cloudy (*)     Specific Gravity, Urine >1.030 (*)     pH, Urine 5.5      Protein, Urine NEGATIVE      Glucose, Urine NEGATIVE      Blood, Urine 1.0 (3+) (*)     Ketones, Urine NEGATIVE      Bilirubin, Urine NEGATIVE      Urobilinogen, Urine NORM      Nitrite, Urine NEGATIVE      Leukocyte Esterase, Urine NEGATIVE     URINALYSIS MICROSCOPIC WITH REFLEX CULTURE - Abnormal    WBC, Urine 1-5      RBC, Urine >20 (*)     Squamous Epithelial Cells, Urine 1-9  (SPARSE)      Mucus, Urine FEW     LACTATE - Normal    Lactate 1.1      Narrative:     Venipuncture immediately after or during the administration of Metamizole may lead to falsely low results. Testing should be performed immediately prior to Metamizole dosing.   URINALYSIS WITH REFLEX CULTURE AND MICROSCOPIC    Narrative:     The following orders were created for panel order Urinalysis with Reflex Culture and Microscopic.  Procedure                               Abnormality         Status                     ---------                               -----------         ------                     Urinalysis with Reflex C...[[id number]]  Abnormal            Final result               Extra Urine Gray Tube[[id number]]                            Final result                 Please view results for these tests on the individual orders.   CBC WITH AUTO DIFFERENTIAL    WBC 6.8      nRBC 0.0      RBC 4.62      Hemoglobin 13.9      Hematocrit 40.2      MCV 87      MCH 30.1      MCHC 34.6      RDW 12.7      Platelets 199      Neutrophils % 63.5      Immature Granulocytes %, Automated 0.3      Lymphocytes % 27.2      Monocytes % 7.1      Eosinophils % 1.3      Basophils % 0.6      Neutrophils Absolute 4.30      Immature Granulocytes Absolute, Automated 0.02      Lymphocytes Absolute 1.84      Monocytes Absolute 0.48      Eosinophils Absolute 0.09      Basophils Absolute 0.04     EXTRA URINE GRAY TUBE    Extra Tube Hold for add-ons.       Pain Management Panel          Latest Ref Rng & Units 7/10/2024   Pain Management Panel   Amphetamine Screen, Urine Presumptive Negative Presumptive Negative    Barbiturate Screen, Urine Presumptive Negative Presumptive Negative    Benzodiazepines Screen, Urine Presumptive Negative Presumptive Negative    Fentanyl Screen, Urine Presumptive Negative Presumptive Negative    Methadone Screen, Urine Presumptive Negative Presumptive Negative       Details                 CT abdomen pelvis wo IV  contrast   Final Result   Nonobstructing left renal calculi.  There is no evidence ureteral   calculi.  There is no evidence of hydronephrosis.   No acute process.   Signed by Chiki Thomason MD          ED Course & [facility]   ED Course as of 10/14/24 1503   Sun Oct 13, 2024   9448 IMPRESSION:  Nonobstructing left renal calculi.  There is no evidence ureteral  calculi.  There is no evidence of hydronephrosis.  No acute process.   [CF]      ED Course User Index  [CF] Erin Us MD         Diagnoses as of 10/14/24 1503   Hematuria, unspecified type       Medical Decision Making  On initial evaluation patient is well-appearing in the emergency department at this time.  She is stating that she has had hematuria intermittently for the last 2 months.  It is worse than he was concerned.  Urine lab work and CT imaging ordered at this time.  Urinalysis is positive for hematuria but no sign of infection currently.  CBC shows leukocytosis or signs of anemia.  Lactic is negative CMP is within normal limits.  A urine culture is pending.  CT abdomen pelvis is pending. Dr Us will dispo patient pending CT imaging.        Procedure  Procedures        Differential Diagnoses include hematuria, UTI, kidney stone  This is not an exhaustive list of all the diagnosis and therapeutics are considered during the patient's evaluation for an emergency medical condition.     SAM Thomson  10/13/24 1636       DAMION Thomson-CNP  10/14/24 1503

## 2024-10-14 LAB — HOLD SPECIMEN: NORMAL

## 2024-10-18 ENCOUNTER — OFFICE VISIT (OUTPATIENT)
Dept: UROLOGY | Facility: CLINIC | Age: 34
End: 2024-10-18
Payer: MEDICAID

## 2024-10-18 DIAGNOSIS — N20.0 KIDNEY STONE: ICD-10-CM

## 2024-10-18 LAB
POC APPEARANCE, URINE: ABNORMAL
POC BILIRUBIN, URINE: NEGATIVE
POC BLOOD, URINE: NEGATIVE
POC COLOR, URINE: YELLOW
POC GLUCOSE, URINE: NEGATIVE MG/DL
POC KETONES, URINE: NEGATIVE MG/DL
POC LEUKOCYTES, URINE: NEGATIVE
POC NITRITE,URINE: NEGATIVE
POC PH, URINE: 8 PH
POC PROTEIN, URINE: ABNORMAL MG/DL
POC SPECIFIC GRAVITY, URINE: 1.02
POC UROBILINOGEN, URINE: 1 EU/DL

## 2024-10-18 PROCEDURE — 81003 URINALYSIS AUTO W/O SCOPE: CPT | Performed by: UROLOGY

## 2024-10-18 PROCEDURE — 99203 OFFICE O/P NEW LOW 30 MIN: CPT | Performed by: UROLOGY

## 2024-10-18 NOTE — PROGRESS NOTES
10/18/2024  34-year-old female presents an incidental finding of left nonobstructive kidney stone.  No flank pain    Patient has no nausea, no vomiting, no fever.    CT scan demonstrated a 1 to 2 mm kidney stone in left kidney without hydronephrosis    We discussed kidney stone, nonobstructive conservative management  All the questions were answered, the patient expressed understanding and agreed to the plan.    Impression  Left kidney stone-nonobstructive    Plan  Conservative management  Call if flank pain or nausea and vomiting etc.    Chief Complaint   Patient presents with    Nephrolithiasis     Pt here for kidney stones Pt says its very painful and sometimes has blood in the urine Pt has 3x         Physical Exam     TODAYS LAB RESULTS:  ontains abnormal data POCT UA Automated manually resulted  Order: [id number]   Status: Final result       Visible to patient: Yes (not seen)       Next appt: 11/18/2024 at 10:45 AM in Primary Care (Minda Veliz DO)       Dx: Kidney stone    0 Result Notes      Component  Ref Range & Units 08:42   POC Color, Urine  Straw, Yellow, Light-Yellow Yellow   POC Appearance, Urine  Clear Cloudy Abnormal    POC Glucose, Urine  NEGATIVE mg/dl NEGATIVE   POC Bilirubin, Urine  NEGATIVE NEGATIVE   POC Ketones, Urine  NEGATIVE mg/dl NEGATIVE   POC Specific Gravity, Urine  1.005 - 1.035 1.020   POC Blood, Urine  NEGATIVE NEGATIVE   POC PH, Urine  No Reference Range Established PH 8.0   POC Protein, Urine  NEGATIVE, 30 (1+) mg/dl 30 (1+)   POC Urobilinogen, Urine  0.2, 1.0 EU/DL 1.0   Poc Nitrite, Urine  NEGATIVE NEGATIVE   POC Leukocytes, Urine  NEGATIVE NEGATIVE                 ASSESSMENT&PLAN:      IMPRESSIONS:    [id number]      TUDY:  CT Abdomen and Pelvis without IV Contrast; 10/13/2024 at 2:28 PM.  INDICATION:  Hematuria, flank pain.  COMPARISON:  CT AP 4/27/2021.  ACCESSION NUMBER(S):  [id number]  ORDERING CLINICIAN:  LORENZO BRANNON  TECHNIQUE:  CT of the abdomen and pelvis was  performed.  Contiguous axial images  were obtained at 3 mm slice thickness through the abdomen and pelvis.   Coronal and sagittal reconstructions at 3 mm slice thickness were  performed. No intravenous contrast was administered.    Automated mA/kV exposure control was utilized and patient examination  was performed in strict accordance with principles of ALARA.  FINDINGS:  Please note that the evaluation of vessels, lymph nodes and organs is  limited without intravenous contrast.      LOWER CHEST:  No cardiomegaly.  No pericardial effusion.  Lung bases are clear.     ABDOMEN:     LIVER:  No hepatomegaly.  Smooth surface contour.  Normal attenuation.     BILE DUCTS:  No intrahepatic or extrahepatic biliary ductal dilatation.     GALLBLADDER:  The gallbladder is unremarkable.  STOMACH:  There is a hiatal hernia..     PANCREAS:  No masses or ductal dilatation.     SPLEEN:  No splenomegaly or focal splenic lesion.     ADRENAL GLANDS:  No thickening or nodules.     KIDNEYS AND URETERS:  Kidneys are normal in size and location.  There is a 1 and a 2 mm  nonobstructing calculus in the upper pole of the left kidney.     PELVIS:     BLADDER:  No abnormalities identified.     REPRODUCTIVE ORGANS:  No abnormalities identified.     BOWEL:  There is diverticulosis.  The appendix is identified and is normal.     VESSELS:  No abnormalities identified.  Abdominal aorta is normal in caliber.      PERITONEUM/RETROPERITONEUM/LYMPH NODES:  No free fluid.  No pneumoperitoneum.  No lymphadenopathy.     ABDOMINAL WALL:  No abnormalities identified.  SOFT TISSUES:   No abnormalities identified.     BONES:  No acute fracture or aggressive osseous lesion.  IMPRESSION:  Nonobstructing left renal calculi.  There is no evidence ureteral  calculi.  There is no evidence of hydronephrosis.  No acute process.  Signed by Chiki Thomason MD

## 2024-10-20 ENCOUNTER — HOSPITAL ENCOUNTER (EMERGENCY)
Facility: HOSPITAL | Age: 34
Discharge: HOME | End: 2024-10-21
Attending: STUDENT IN AN ORGANIZED HEALTH CARE EDUCATION/TRAINING PROGRAM
Payer: MEDICAID

## 2024-10-20 ENCOUNTER — APPOINTMENT (OUTPATIENT)
Dept: RADIOLOGY | Facility: HOSPITAL | Age: 34
End: 2024-10-20
Payer: MEDICAID

## 2024-10-20 DIAGNOSIS — G40.919 BREAKTHROUGH SEIZURE (MULTI): Primary | ICD-10-CM

## 2024-10-20 LAB
ALBUMIN SERPL BCP-MCNC: 4.3 G/DL (ref 3.4–5)
ALP SERPL-CCNC: 65 U/L (ref 33–120)
ALT SERPL W P-5'-P-CCNC: 17 U/L (ref 7–52)
ANION GAP SERPL CALC-SCNC: 9 MMOL/L (ref 10–20)
AST SERPL W P-5'-P-CCNC: 13 U/L (ref 9–39)
BASOPHILS # BLD AUTO: 0.05 X10*3/UL (ref 0–0.1)
BASOPHILS NFR BLD AUTO: 0.5 %
BILIRUB SERPL-MCNC: 0.3 MG/DL (ref 0–1.2)
BUN SERPL-MCNC: 13 MG/DL (ref 6–23)
CALCIUM SERPL-MCNC: 9.3 MG/DL (ref 8.6–10.3)
CHLORIDE SERPL-SCNC: 106 MMOL/L (ref 98–107)
CO2 SERPL-SCNC: 26 MMOL/L (ref 21–32)
CREAT SERPL-MCNC: 1.19 MG/DL (ref 0.5–1.3)
EGFRCR SERPLBLD CKD-EPI 2021: 61 ML/MIN/1.73M*2
EOSINOPHIL # BLD AUTO: 0.19 X10*3/UL (ref 0–0.7)
EOSINOPHIL NFR BLD AUTO: 1.8 %
ERYTHROCYTE [DISTWIDTH] IN BLOOD BY AUTOMATED COUNT: 12.7 % (ref 11.5–14.5)
GLUCOSE SERPL-MCNC: 73 MG/DL (ref 74–99)
HCT VFR BLD AUTO: 40.7 % (ref 36–52)
HGB BLD-MCNC: 14 G/DL (ref 12–17.5)
IMM GRANULOCYTES # BLD AUTO: 0.03 X10*3/UL (ref 0–0.7)
IMM GRANULOCYTES NFR BLD AUTO: 0.3 % (ref 0–0.9)
LYMPHOCYTES # BLD AUTO: 2.68 X10*3/UL (ref 1.2–4.8)
LYMPHOCYTES NFR BLD AUTO: 25.8 %
MAGNESIUM SERPL-MCNC: 2.19 MG/DL (ref 1.6–2.4)
MCH RBC QN AUTO: 30.1 PG (ref 26–34)
MCHC RBC AUTO-ENTMCNC: 34.4 G/DL (ref 32–36)
MCV RBC AUTO: 88 FL (ref 80–100)
MONOCYTES # BLD AUTO: 0.72 X10*3/UL (ref 0.1–1)
MONOCYTES NFR BLD AUTO: 6.9 %
NEUTROPHILS # BLD AUTO: 6.73 X10*3/UL (ref 1.2–7.7)
NEUTROPHILS NFR BLD AUTO: 64.7 %
NRBC BLD-RTO: 0 /100 WBCS (ref 0–0)
PLATELET # BLD AUTO: 239 X10*3/UL (ref 150–450)
POTASSIUM SERPL-SCNC: 3.7 MMOL/L (ref 3.5–5.3)
PROT SERPL-MCNC: 7.4 G/DL (ref 6.4–8.2)
RBC # BLD AUTO: 4.65 X10*6/UL (ref 4–5.9)
SODIUM SERPL-SCNC: 137 MMOL/L (ref 136–145)
WBC # BLD AUTO: 10.4 X10*3/UL (ref 4.4–11.3)

## 2024-10-20 PROCEDURE — 70450 CT HEAD/BRAIN W/O DYE: CPT

## 2024-10-20 PROCEDURE — 72125 CT NECK SPINE W/O DYE: CPT

## 2024-10-20 PROCEDURE — 36415 COLL VENOUS BLD VENIPUNCTURE: CPT | Performed by: NURSE PRACTITIONER

## 2024-10-20 PROCEDURE — 85025 COMPLETE CBC W/AUTO DIFF WBC: CPT | Performed by: NURSE PRACTITIONER

## 2024-10-20 PROCEDURE — 80177 DRUG SCRN QUAN LEVETIRACETAM: CPT | Mod: PORLAB | Performed by: NURSE PRACTITIONER

## 2024-10-20 PROCEDURE — 99285 EMERGENCY DEPT VISIT HI MDM: CPT

## 2024-10-20 PROCEDURE — 84075 ASSAY ALKALINE PHOSPHATASE: CPT | Performed by: NURSE PRACTITIONER

## 2024-10-20 PROCEDURE — 70450 CT HEAD/BRAIN W/O DYE: CPT | Performed by: RADIOLOGY

## 2024-10-20 PROCEDURE — 72125 CT NECK SPINE W/O DYE: CPT | Performed by: RADIOLOGY

## 2024-10-20 PROCEDURE — 83735 ASSAY OF MAGNESIUM: CPT | Performed by: NURSE PRACTITIONER

## 2024-10-20 ASSESSMENT — PAIN DESCRIPTION - ORIENTATION
ORIENTATION_2: LEFT;ANTERIOR
ORIENTATION: LEFT;POSTERIOR

## 2024-10-20 ASSESSMENT — PAIN DESCRIPTION - FREQUENCY: FREQUENCY: CONSTANT/CONTINUOUS

## 2024-10-20 ASSESSMENT — PAIN DESCRIPTION - LOCATION
LOCATION_2: HEAD
LOCATION: HEAD

## 2024-10-20 ASSESSMENT — PAIN DESCRIPTION - DESCRIPTORS
DESCRIPTORS_2: SHARP;ACHING
DESCRIPTORS: ACHING;THROBBING

## 2024-10-20 ASSESSMENT — PAIN DESCRIPTION - PAIN TYPE: TYPE: ACUTE PAIN

## 2024-10-20 ASSESSMENT — COLUMBIA-SUICIDE SEVERITY RATING SCALE - C-SSRS
2. HAVE YOU ACTUALLY HAD ANY THOUGHTS OF KILLING YOURSELF?: NO
1. IN THE PAST MONTH, HAVE YOU WISHED YOU WERE DEAD OR WISHED YOU COULD GO TO SLEEP AND NOT WAKE UP?: NO
6. HAVE YOU EVER DONE ANYTHING, STARTED TO DO ANYTHING, OR PREPARED TO DO ANYTHING TO END YOUR LIFE?: NO

## 2024-10-20 ASSESSMENT — PAIN SCALES - GENERAL
PAINLEVEL_OUTOF10: 2
PAINLEVEL_OUTOF10: 4

## 2024-10-20 ASSESSMENT — PAIN - FUNCTIONAL ASSESSMENT: PAIN_FUNCTIONAL_ASSESSMENT: 0-10

## 2024-10-21 VITALS
TEMPERATURE: 97.7 F | WEIGHT: 254.41 LBS | DIASTOLIC BLOOD PRESSURE: 78 MMHG | HEIGHT: 72 IN | OXYGEN SATURATION: 98 % | HEART RATE: 58 BPM | RESPIRATION RATE: 18 BRPM | SYSTOLIC BLOOD PRESSURE: 117 MMHG | BODY MASS INDEX: 34.46 KG/M2

## 2024-10-21 LAB — LEVETIRACETAM SERPL-MCNC: 27 UG/ML (ref 10–40)

## 2024-10-21 ASSESSMENT — LIFESTYLE VARIABLES
TOTAL SCORE: 0
HAVE YOU EVER FELT YOU SHOULD CUT DOWN ON YOUR DRINKING: NO
HAVE PEOPLE ANNOYED YOU BY CRITICIZING YOUR DRINKING: NO
EVER HAD A DRINK FIRST THING IN THE MORNING TO STEADY YOUR NERVES TO GET RID OF A HANGOVER: NO
EVER FELT BAD OR GUILTY ABOUT YOUR DRINKING: NO

## 2024-10-21 NOTE — ED PROVIDER NOTES
Chief Complaint   Patient presents with    Seizures     1850 pt woke on bathroom floor and does not remember what happened  and confusion  hx of seizures  pt has some pain lt back of head and behind lt eye         HPI       34 year old female presents to the Emergency Department today complaining of awakening on the bathroom floor and not knowing what had occurred. Reports that she has had amnesia regarding the events and the only time this occurs is after having a seizure. Currently on Keppra and taking the medication as directed. Has developed a pain in the back of her head that she describes as throbbing in nature, constant, and varies in intensity. The headache is not the first or worst of their life. Not of sudden onset or thunderclap in nature. This is also common post a seizure. Last seizure was 1 year ago. Denies any associated fever, chills, neck pain, chest pain, shortness of breath, abdominal pain, nausea, vomiting, diarrhea, constipation, or urinary symptoms.      History provided by:  Patient             Patient History   Past Medical History:   Diagnosis Date    ADHD (attention deficit hyperactivity disorder)     Anxiety     Chondromalacia patellae, right knee 10/24/2019    Chondromalacia of right patella    Chronic low back pain 04/13/2023    Depression     Dorsalgia, unspecified 06/24/2020    Acute back pain    Flushing 01/06/2021    Flushing    Generalized abdominal pain 11/02/2021    Abdominal pain, generalized    Local infection of the skin and subcutaneous tissue, unspecified 10/06/2020    Skin infection, bacterial    Nausea 03/10/2021    Nausea in adult    Other forms of dyspnea 03/08/2018    Dyspnea on exertion    Other hemorrhoids 07/15/2021    Internal hemorrhoids    Other skin changes 07/08/2021    Skin irritation    Otitis media, unspecified, left ear 05/01/2020    Acute otitis media, left    Personal history of COVID-19     Personal history of COVID-19    Personal history of diseases of  the skin and subcutaneous tissue 03/10/2021    History of dermatitis    Personal history of other diseases of the respiratory system 10/06/2021    History of sore throat    Personal history of other diseases of the respiratory system 10/06/2021    History of laryngitis    Personal history of other specified conditions 08/17/2021    History of dysphagia    Personal history of other specified conditions 05/03/2018    History of palpitations    Personal history of other specified conditions 05/03/2018    History of exertional chest pain    Personal history of other specified conditions 05/03/2018    History of syncope    Seasonal asthma ([facility])      Past Surgical History:   Procedure Laterality Date    INNER EAR SURGERY  03/08/2018    Inner Ear Surgery    OTHER SURGICAL HISTORY  04/05/2022    Oral surgery    OTHER SURGICAL HISTORY  08/06/2021    Esophagogastroduodenoscopy    OTHER SURGICAL HISTORY  08/06/2021    Colonoscopy    TONSILLECTOMY  03/08/2018    Tonsillectomy     Family History   Problem Relation Name Age of Onset    Other (cva) Other      Epilepsy Other      Ulcerative colitis Other      Hypertension Other       Social History     Tobacco Use    Smoking status: Never     Passive exposure: Never    Smokeless tobacco: Never   Vaping Use    Vaping status: Never Used   Substance Use Topics    Alcohol use: Not Currently     Comment: very rare    Drug use: Yes     Types: Marijuana     Comment: ocassional edibles           Physical Exam  Constitutional:       Appearance: Normal appearance.   HENT:      Head: Normocephalic.      Right Ear: External ear normal.      Left Ear: External ear normal.      Nose: Nose normal.      Mouth/Throat:      Mouth: Mucous membranes are moist.      Pharynx: Oropharynx is clear. No oropharyngeal exudate or posterior oropharyngeal erythema.   Eyes:      Conjunctiva/sclera: Conjunctivae normal.      Pupils: Pupils are equal, round, and reactive to light.   Cardiovascular:      Rate  and Rhythm: Normal rate and regular rhythm.      Pulses:           Radial pulses are 3+ on the right side and 3+ on the left side.        Dorsalis pedis pulses are 3+ on the right side and 3+ on the left side.      Heart sounds: Normal heart sounds. No murmur heard.     No friction rub. No gallop.   Pulmonary:      Effort: Pulmonary effort is normal. No respiratory distress.      Breath sounds: Normal breath sounds. No wheezing, rhonchi or rales.   Abdominal:      General: Abdomen is flat. Bowel sounds are normal.      Palpations: Abdomen is soft.      Tenderness: There is no abdominal tenderness. There is no right CVA tenderness, left CVA tenderness, guarding or rebound. Negative signs include Cantu's sign and McBurney's sign.   Musculoskeletal:         General: No swelling or deformity.      Cervical back: Full passive range of motion without pain.      Right lower leg: No edema.      Left lower leg: No edema.   Lymphadenopathy:      Cervical: No cervical adenopathy.   Skin:     Capillary Refill: Capillary refill takes less than 2 seconds.      Coloration: Skin is not jaundiced.      Findings: No rash.   Neurological:      General: No focal deficit present.      Mental Status: She is alert and oriented to person, place, and time. Mental status is at baseline.      Gait: Gait is intact.   Psychiatric:         Mood and Affect: Mood normal.         Behavior: Behavior is cooperative.         Labs Reviewed - No data to display    No orders to display            ED Course & MDM   ED Course as of 10/21/24 0043   Mon Oct 21, 2024   0036 IMPRESSION:  No evidence of acute fracture or subluxation of the cervical spine.      Straightening of the normal cervical lordosis which may be secondary  to patient position or muscle spasm   [CF]   0036 CT head wo IV contrast [CF]   0036 IMPRESSION:  No evidence of acute intracranial hemorrhage or depressed calvarial  fracture.   [CF]      ED Course User Index  [CF] Erin Us  MD           Medical Decision Making  Patient was seen and evaluated by myself in triage per [facility] protocol. History and physical was obtained and orders were placed based on such     Junior Esparza MSN  CNP      This is a 34-year-old female who presents to the emergency department for concern of a seizure after waking up on the bathroom floor.  They are on Keppra currently.  Here patient has no phonologic deficits, workup does show no leukocytosis or left shift no signs of anemia.  CT of her head and neck showed no acute pathology.  Electrolytes are all within normal limits.  Keppra levels pending.  At this time patient is safe for discharge home and follow-up with neurologist.           Your medication list        ASK your doctor about these medications        Instructions Last Dose Given Next Dose Due   ARIPiprazole 20 mg tablet  Commonly known as: Abilify           bicalutamide 50 mg tablet  Commonly known as: Casodex      Take 1 tablet (50 mg total) by mouth once daily.       cetirizine 10 mg tablet  Commonly known as: ZyrTEC      Take 1 tablet (10 mg) by mouth once daily. Allergy medication       estradioL 1.25 mg/1.25 gram (0.1 %) gel in packet      Place 3.75 mg on the skin once daily. (3 packets)       famotidine 40 mg tablet  Commonly known as: Pepcid      Take 1 tablet by mouth twice daily       hydrocortisone 2.5 % rectal cream  Commonly known as: Anusol-HC      Insert into the rectum 2 times a day for 14 days.       lamoTRIgine 150 mg tablet  Commonly known as: LaMICtal           levETIRAcetam 750 mg tablet  Commonly known as: Keppra           multivitamin with minerals tablet           prazosin 1 mg capsule  Commonly known as: Minipress           progesterone 200 mg capsule  Commonly known as: Prometrium      Take 1 capsule (200 mg) by mouth once daily.       tiZANidine 2 mg tablet  Commonly known as: Zanaflex      Take 1 tablet (2 mg) by mouth 2 times a day as needed for muscle spasms.        traZODone 100 mg tablet  Commonly known as: Desyrel           zonisamide 100 mg capsule  Commonly known as: Zonegran                      Procedure  Procedures     Erin Us MD  10/21/24 0043

## 2024-10-31 ENCOUNTER — TELEPHONE (OUTPATIENT)
Dept: PRIMARY CARE | Facility: CLINIC | Age: 34
End: 2024-10-31
Payer: MEDICAID

## 2024-11-04 ENCOUNTER — TELEPHONE (OUTPATIENT)
Dept: UROLOGY | Facility: CLINIC | Age: 34
End: 2024-11-04
Payer: MEDICAID

## 2024-11-04 NOTE — TELEPHONE ENCOUNTER
Pre-Consult Phone Call    Verified patient by name and date of birth.    Confirmed consult needed for urological reconstruction.    Patient interested in PPT Vaginoplasty     Information provided TGD questionnaire, Letters of support, and Surgical procedure brochure and Myer weblink.     HRT >1 year 2019     Nicotine Never     Height 6'    Weight approx 254 lbs    BMI approx 34.5    DM2 Never    Hair Removal None    Ensured patient is aware that letters of support are valid for one year and that we cannot schedule their surgery until we have received copies of both letters.    Encouraged patient to become familiar with their insurance policy, coverage and financial responsibility.    Addressed patient's questions.    Encouraged patient to contact the team with any other questions or concerns.

## 2024-11-16 NOTE — PATIENT INSTRUCTIONS
Thank you for choosing [facility] for your healthcare.   As always if you have any questions or concerns please do not hesitate to call our office at [phone] or through [employer].    Have a great day!  Minda Veliz, DO

## 2024-11-18 ENCOUNTER — APPOINTMENT (OUTPATIENT)
Dept: PRIMARY CARE | Facility: CLINIC | Age: 34
End: 2024-11-18
Payer: MEDICAID

## 2024-11-18 VITALS
DIASTOLIC BLOOD PRESSURE: 72 MMHG | BODY MASS INDEX: 35.35 KG/M2 | WEIGHT: 261 LBS | HEART RATE: 78 BPM | RESPIRATION RATE: 22 BRPM | TEMPERATURE: 96.8 F | HEIGHT: 72 IN | SYSTOLIC BLOOD PRESSURE: 114 MMHG | OXYGEN SATURATION: 99 %

## 2024-11-18 DIAGNOSIS — K21.9 GASTROESOPHAGEAL REFLUX DISEASE WITHOUT ESOPHAGITIS: ICD-10-CM

## 2024-11-18 DIAGNOSIS — G89.29 CHRONIC LEFT-SIDED LOW BACK PAIN WITH LEFT-SIDED SCIATICA: ICD-10-CM

## 2024-11-18 DIAGNOSIS — M54.42 CHRONIC LEFT-SIDED LOW BACK PAIN WITH LEFT-SIDED SCIATICA: ICD-10-CM

## 2024-11-18 DIAGNOSIS — F64.0 GENDER DYSPHORIA IN ADULT: ICD-10-CM

## 2024-11-18 PROCEDURE — 99213 OFFICE O/P EST LOW 20 MIN: CPT | Performed by: FAMILY MEDICINE

## 2024-11-18 PROCEDURE — 3008F BODY MASS INDEX DOCD: CPT | Performed by: FAMILY MEDICINE

## 2024-11-18 PROCEDURE — 1036F TOBACCO NON-USER: CPT | Performed by: FAMILY MEDICINE

## 2024-11-18 RX ORDER — HYDROXYZINE PAMOATE 25 MG/1
1 CAPSULE ORAL
COMMUNITY
Start: 2024-11-06

## 2024-11-18 RX ORDER — FAMOTIDINE 40 MG/1
40 TABLET, FILM COATED ORAL 2 TIMES DAILY
Qty: 60 TABLET | Refills: 11 | Status: SHIPPED | OUTPATIENT
Start: 2024-11-18

## 2024-11-18 RX ORDER — ESTRADIOL 1.25 MG/1.25G
3.75 GEL TOPICAL DAILY
Qty: 112.5 G | Refills: 11 | Status: CANCELLED | OUTPATIENT
Start: 2024-11-18 | End: 2025-11-18

## 2024-11-18 RX ORDER — TIZANIDINE 2 MG/1
2 TABLET ORAL 2 TIMES DAILY PRN
Qty: 180 TABLET | Refills: 1 | Status: SHIPPED | OUTPATIENT
Start: 2024-11-18 | End: 2025-05-17

## 2024-11-18 SDOH — ECONOMIC STABILITY: FOOD INSECURITY: WITHIN THE PAST 12 MONTHS, THE FOOD YOU BOUGHT JUST DIDN'T LAST AND YOU DIDN'T HAVE MONEY TO GET MORE.: NEVER TRUE

## 2024-11-18 SDOH — ECONOMIC STABILITY: FOOD INSECURITY: WITHIN THE PAST 12 MONTHS, YOU WORRIED THAT YOUR FOOD WOULD RUN OUT BEFORE YOU GOT MONEY TO BUY MORE.: NEVER TRUE

## 2024-11-18 ASSESSMENT — PATIENT HEALTH QUESTIONNAIRE - PHQ9
1. LITTLE INTEREST OR PLEASURE IN DOING THINGS: NOT AT ALL
SUM OF ALL RESPONSES TO PHQ9 QUESTIONS 1 & 2: 0
2. FEELING DOWN, DEPRESSED OR HOPELESS: NOT AT ALL

## 2024-11-18 ASSESSMENT — LIFESTYLE VARIABLES
HOW OFTEN DO YOU HAVE A DRINK CONTAINING ALCOHOL: NEVER
AUDIT-C TOTAL SCORE: 0
SKIP TO QUESTIONS 9-10: 1
HOW OFTEN DO YOU HAVE SIX OR MORE DRINKS ON ONE OCCASION: NEVER
HOW MANY STANDARD DRINKS CONTAINING ALCOHOL DO YOU HAVE ON A TYPICAL DAY: PATIENT DOES NOT DRINK

## 2024-11-18 ASSESSMENT — ANXIETY QUESTIONNAIRES
GAD7 TOTAL SCORE: 14
IF YOU CHECKED OFF ANY PROBLEMS ON THIS QUESTIONNAIRE, HOW DIFFICULT HAVE THESE PROBLEMS MADE IT FOR YOU TO DO YOUR WORK, TAKE CARE OF THINGS AT HOME, OR GET ALONG WITH OTHER PEOPLE: NOT DIFFICULT AT ALL
3. WORRYING TOO MUCH ABOUT DIFFERENT THINGS: MORE THAN HALF THE DAYS
5. BEING SO RESTLESS THAT IT IS HARD TO SIT STILL: MORE THAN HALF THE DAYS
6. BECOMING EASILY ANNOYED OR IRRITABLE: MORE THAN HALF THE DAYS
4. TROUBLE RELAXING: MORE THAN HALF THE DAYS
1. FEELING NERVOUS, ANXIOUS, OR ON EDGE: MORE THAN HALF THE DAYS
2. NOT BEING ABLE TO STOP OR CONTROL WORRYING: MORE THAN HALF THE DAYS
7. FEELING AFRAID AS IF SOMETHING AWFUL MIGHT HAPPEN: MORE THAN HALF THE DAYS

## 2024-11-18 ASSESSMENT — ENCOUNTER SYMPTOMS: LOSS OF SENSATION IN FEET: 0

## 2024-11-18 ASSESSMENT — PAIN SCALES - GENERAL: PAINLEVEL_OUTOF10: 4

## 2024-11-18 NOTE — ASSESSMENT & PLAN NOTE
Stable  Continue tizanidine   Orders:    tiZANidine (Zanaflex) 2 mg tablet; Take 1 tablet (2 mg) by mouth 2 times a day as needed for muscle spasms.

## 2024-11-18 NOTE — PROGRESS NOTES
Subjective   Patient ID: Jennifer Terrell is a 34 y.o. adult who presents for Back Pain.  Back pain  Prolonged standing is a trigger. Doing well with tizandine.     She iIs in training for a new job that started 3 weeks ago. The job is remote. Has some anxiety due to starting working. Psychiatry prescribed hydroxyzine which is helping.    She has an appointment to see Dr. Warren for consultation for bottom surgery. She is aware that she will need hair removal and a letter from a second behavioral health therapist. She was hoping that hair removal might be partially covered by insurance. I let her know that to my knowledge, it is not a covered service. I provided her with info for a trans-owned laser hair removal and electrolysis clinic in [place].         Patient Care Team:  Minda Veliz DO as PCP - General  Nadia Orantes as Therapist (Psychology)  DAMION Macdonald-CNP as Nurse Practitioner (Gastroenterology)  Dipti Rosa MD MPH as Consulting Physician (Urology)  DAMION Starks-CNS as Nurse Practitioner (Psychiatry)    Current Outpatient Medications   Medication Sig Dispense Refill    ARIPiprazole (Abilify) 20 mg tablet Take 1 tablet (20 mg) by mouth once daily.      bicalutamide (Casodex) 50 mg tablet Take 1 tablet (50 mg total) by mouth once daily. 90 tablet 3    cetirizine (ZyrTEC) 10 mg tablet Take 1 tablet (10 mg) by mouth once daily. Allergy medication 90 tablet 3    estradioL 1.25 mg/1.25 gram (0.1 %) gel in packet Place 3.75 mg on the skin once daily. (3 packets) 112.5 g 11    hydrocortisone (Anusol-HC) 2.5 % rectal cream Insert into the rectum 2 times a day for 14 days. 30 g 0    hydrOXYzine pamoate (Vistaril) 25 mg capsule Take 1 capsule (25 mg) by mouth every 12 hours.      lamoTRIgine (LaMICtal) 150 mg tablet Take 1 tablet (150 mg) by mouth 2 times a day.      levETIRAcetam (Keppra) 750 mg tablet Take 2 tablets (1,500 mg) by mouth every 12 hours.      prazosin (Minipress) 1 mg capsule  Take 1 capsule (1 mg) by mouth once daily at bedtime. For nightmares      progesterone (Prometrium) 200 mg capsule Take 1 capsule (200 mg) by mouth once daily. 90 capsule 3    traZODone (Desyrel) 100 mg tablet Take 1 tablet (100 mg) by mouth once daily at bedtime.      zonisamide (Zonegran) 100 mg capsule Take 2 capsules (200 mg) by mouth once daily at bedtime.      famotidine (Pepcid) 40 mg tablet Take 1 tablet (40 mg) by mouth 2 times a day. 60 tablet 11    tiZANidine (Zanaflex) 2 mg tablet Take 1 tablet (2 mg) by mouth 2 times a day as needed for muscle spasms. 180 tablet 1     No current facility-administered medications for this visit.       Objective     Visit Vitals  /72 (BP Location: Left arm, Patient Position: Sitting, BP Cuff Size: Adult long)   Pulse 78   Temp 36 °C (96.8 °F) (Temporal)   Resp 22   Ht 1.829 m (6')   Wt 118 kg (261 lb)   SpO2 99%   BMI 35.40 kg/m²   Smoking Status Never   BSA 2.45 m²        Constitutional: Well nourished, well developed, appears stated age  Eyes: no scleral icterus, no conjunctival injection  Respiratory: normal respiratory effort  Musculoskeletal: No gross deformities appreciated  Skin: Warm, dry.  Neurologic: Alert, CNs II-XII grossly intact..  Psych: Appropriate mood and affect.        Assessment & Plan  Chronic left-sided low back pain with left-sided sciatica  Stable  Continue tizanidine   Orders:    tiZANidine (Zanaflex) 2 mg tablet; Take 1 tablet (2 mg) by mouth 2 times a day as needed for muscle spasms.    Gender dysphoria in adult  HRT started 4/2020  Continue estradiol, progesterone, bicalutamide  Has appointment with Dr. Warren for consult for bottom surgery  Back pain  Prolonged standing is a trigger. Doing well with tizandine.   Info for a trans-owned laser hair removal and electrolysis clinic in [place] provided.          Gastroesophageal reflux disease without esophagitis  Stable on famotidine, refilled.   Orders:    famotidine (Pepcid) 40 mg tablet; Take  1 tablet (40 mg) by mouth 2 times a day.       Follow up 3-4 months    Minda Veliz DO

## 2024-11-18 NOTE — LETTER
November 18, 2024     Patient: Jennifer Terrell   YOB: 1990   Date of Visit: 11/18/2024       To Whom It May Concern:    Jennifer Terrell was seen in my clinic on 11/18/2024 at 10:45 am. Please excuse Jennifer for her absence from work on this day to make the appointment.    If you have any questions or concerns, please don't hesitate to call.         Sincerely,         Minda Veliz DO        CC: No Recipients

## 2024-11-18 NOTE — ASSESSMENT & PLAN NOTE
HRT started 4/2020  Continue estradiol, progesterone, bicalutamide  Has appointment with Dr. Warren for consult for bottom surgery  Back pain  Prolonged standing is a trigger. Doing well with tizandine.   Info for a trans-owned laser hair removal and electrolysis clinic in [place] provided.

## 2024-11-18 NOTE — ASSESSMENT & PLAN NOTE
Stable on famotidine, refilled.   Orders:    famotidine (Pepcid) 40 mg tablet; Take 1 tablet (40 mg) by mouth 2 times a day.

## 2024-11-25 ENCOUNTER — APPOINTMENT (OUTPATIENT)
Dept: UROLOGY | Facility: CLINIC | Age: 34
End: 2024-11-25
Payer: MEDICAID

## 2024-11-25 VITALS
HEIGHT: 72 IN | BODY MASS INDEX: 35.89 KG/M2 | HEART RATE: 87 BPM | DIASTOLIC BLOOD PRESSURE: 80 MMHG | WEIGHT: 265 LBS | TEMPERATURE: 97.8 F | SYSTOLIC BLOOD PRESSURE: 124 MMHG

## 2024-11-25 DIAGNOSIS — N20.0 KIDNEY STONE: Primary | ICD-10-CM

## 2024-11-25 DIAGNOSIS — F64.9 GENDER DYSPHORIA: ICD-10-CM

## 2024-11-25 PROCEDURE — 1036F TOBACCO NON-USER: CPT | Performed by: UROLOGY

## 2024-11-25 PROCEDURE — 3008F BODY MASS INDEX DOCD: CPT | Performed by: UROLOGY

## 2024-11-25 PROCEDURE — 99215 OFFICE O/P EST HI 40 MIN: CPT | Performed by: UROLOGY

## 2024-11-25 ASSESSMENT — PAIN SCALES - GENERAL: PAINLEVEL_OUTOF10: 0-NO PAIN

## 2024-11-25 NOTE — PROGRESS NOTES
uestion 11/18/2024  5:18 PM EST - Filed by Patient   1. WHAT NAME DO YOU GO BY? Jennifer   2. WHAT PRONOUNS DO YOU PREFER? She/Her   3. WHAT IS YOUR IDENTIFIED GENDER? Female   4. WHEN DID YOU FIRST FEEL LIKE YOU WERE IN THE WRONG BODY OR IDENTIFIED WITH ANOTHER GENDER? Age 5   5. AT WHAT AGE DID YOU FIRST EXPERIENCE DYSPHORIA? Age 6   6. WHAT PART OF YOUR BODY GIVES YOU THE MOST DYSPHORIA? (PENIS - TESTICLES - VAGINA - BREASTS - CHEST - JAWLINE - JARRED’S APPLE - VOICE,OTHER) Genitals   7. WHEN DID YOU SOCIALLY TRANSITION? IN OTHER WORDS, WHEN DID YOU START CONSISTENTLY PRESENTING TO YOUR FRIENDS AND/OR FAMILY IN YOUR IDENTIFIED GENDER? 2019   8. WHEN DID YOU HORMONALLY TRANSITION? IN OTHER WORDS, WHEN DID YOU START TAKING GENDER AFFIRMING HORMONE THERAPY SUCH AS TESTOSTERONE OR ESTROGEN? 2020   9. DID YOU EVER TAKE PUBERTY BLOCKERS TO DELAY OR PREVENT THE ONSET OF PUBERTY? No   10. WHO IS YOUR CURRENT HORMONE PROVIDER? Dr Minda Veliz   11. DO YOU HAVE AN ESTABLISHED RELATIONSHIP WITH A THERAPIST AND/OR OTHER MENTAL HEALTH PROVIDER? IF SO, PLEASE PROVIDE THEIR NAME. Roxie Peacock   12. PLEASE LIST ANY HISTORY OF MENTAL HEALTH CHALLENGES (NONE-ANXIETY - DEPRESSION - BIPOLAR-SUICIDAL THOUGHTS-SUICIDAL ATTEMPT-DISSOCIATIVE DISORDER-SCHIZOPHRENIA-OTHER) Anxiety, depression, dissociative disorder, OCD   13. WHO ARE YOU SEXUALLY ATTRACTED TO? All genders   14. DO YOU CURRENTLY HAVE A PARTNER(S)? Yes   15. WHAT DO YOU HOPE TO ACHIEVE FROM GENDER AFFIRMING BOTTOM SURGERY? (ADDRESS DYSPHORIA - SEXUAL FUNCTION - RECEPTIVE VAGINAL INTERCOURSE -PENETRATIVE INTERCOURSE - STANDING TO PEE, OTHER) Address dysphoria, sexual function, receptive vaginal intercourse   16. ANY PREVIOUS GENDER AFFIRMING SURGERIES? IF SO,PLEASE SPECIFY. No   17. ANY OTHER MEDICAL HISTORY? (NONE - DIABETES - HIGH BLOOD PRESSURE - HEART DISEASE OTHER) Epilepsy   18. ANY NICOTINE USE IN ANY FORM? No   19. ANY MARIJUANA/THC/CBD USE IN ANY FORM? Yes   20.  ANY OTHER RECREATIONAL DRUG USE? No   21. ANY ALCOHOL CONSUMPTION? Rarely   22. HAIR REMOVAL - WHERE ON YOUR BODY, HOW (LASER OR ELECTROLYSIS, ETC.), HOW MANY SESSIONS? Not yet.   23. DO YOU HAVE ANY CHILDREN? No   24. ARE YOU INTERESTED IN FERTILITY PRESERVATION? No     Surgical consultation - Vaginoplasty    Presenting complaint: Gender dysphoria, seeking vaginoplasty    History:  - On hormone therapy since 2020  - Reports good results overall though desires more chest development  - Currently living in [place], works in customer service for [employer]    Medical history:  - Seizure disorder on anticonvulsants  - Hypothyroidism  - Anxiety  - Chronic migraines  - Sleep disorder  - Mood disorder requiring stabilisers  - Small renal calculi (1-2mm) left kidney    Medications:  - Anticonvulsants  - Thyroid medication  - Anxiety medication  - Migraine medication  - Sleep medication  - Mood stabilisers  - Hormone therapy    Social history:  - Non-smoker  - Minimal alcohol (occasional drink at [place])  - Lives with roommate  - Has partner  - Previously lived in [place]    Surgical discussion:  - Discussed vaginoplasty procedure (4-6 hour operation)  - Explained components:  - Removal of male anatomy (penectomy, orchiectomy, scrotectomy)  - Creation of female anatomy (urethroplasty, clitoroplasty, labiaplasty)  - Vaginal canal creation  - Discussed surgical options:  - Penile inversion  - Peritoneal pull-through  - Intestinal vaginoplasty (salvage procedure only)  - Patient expressing preference for peritoneal pull-through approach    Plan:  - Await two mental health letters (one completed, second pending)  - Will schedule surgery once letters received  - Pre-operative review 6-8 weeks before surgery  - Pre-operative blood work to be arranged  - No immediate concerns regarding renal calculi    Follow up:  - To forward mental health letters to coordinator when complete  - Will review again pre-operatively for detailed  surgical discussion

## 2024-12-04 ENCOUNTER — APPOINTMENT (OUTPATIENT)
Dept: RADIOLOGY | Facility: HOSPITAL | Age: 34
End: 2024-12-04
Payer: MEDICAID

## 2024-12-04 ENCOUNTER — HOSPITAL ENCOUNTER (EMERGENCY)
Facility: HOSPITAL | Age: 34
Discharge: HOME | End: 2024-12-05
Payer: MEDICAID

## 2024-12-04 ENCOUNTER — APPOINTMENT (OUTPATIENT)
Dept: CARDIOLOGY | Facility: HOSPITAL | Age: 34
End: 2024-12-04
Payer: MEDICAID

## 2024-12-04 DIAGNOSIS — S39.012A LUMBAR STRAIN, INITIAL ENCOUNTER: Primary | ICD-10-CM

## 2024-12-04 LAB
ALBUMIN SERPL BCP-MCNC: 4.4 G/DL (ref 3.4–5)
ALP SERPL-CCNC: 69 U/L (ref 33–120)
ALT SERPL W P-5'-P-CCNC: 17 U/L (ref 7–52)
ANION GAP SERPL CALC-SCNC: 11 MMOL/L (ref 10–20)
AST SERPL W P-5'-P-CCNC: 17 U/L (ref 9–39)
BASOPHILS # BLD AUTO: 0.06 X10*3/UL (ref 0–0.1)
BASOPHILS NFR BLD AUTO: 0.6 %
BILIRUB SERPL-MCNC: 0.4 MG/DL (ref 0–1.2)
BUN SERPL-MCNC: 11 MG/DL (ref 6–23)
CALCIUM SERPL-MCNC: 9.3 MG/DL (ref 8.6–10.3)
CHLORIDE SERPL-SCNC: 105 MMOL/L (ref 98–107)
CO2 SERPL-SCNC: 24 MMOL/L (ref 21–32)
CREAT SERPL-MCNC: 1.07 MG/DL (ref 0.5–1.3)
EGFRCR SERPLBLD CKD-EPI 2021: 70 ML/MIN/1.73M*2
EOSINOPHIL # BLD AUTO: 0.1 X10*3/UL (ref 0–0.7)
EOSINOPHIL NFR BLD AUTO: 1.1 %
ERYTHROCYTE [DISTWIDTH] IN BLOOD BY AUTOMATED COUNT: 12.7 % (ref 11.5–14.5)
GLUCOSE SERPL-MCNC: 69 MG/DL (ref 74–99)
HCT VFR BLD AUTO: 41.4 % (ref 36–52)
HGB BLD-MCNC: 14.5 G/DL (ref 12–17.5)
IMM GRANULOCYTES # BLD AUTO: 0.03 X10*3/UL (ref 0–0.7)
IMM GRANULOCYTES NFR BLD AUTO: 0.3 % (ref 0–0.9)
LACTATE SERPL-SCNC: 0.7 MMOL/L (ref 0.4–2)
LYMPHOCYTES # BLD AUTO: 3.24 X10*3/UL (ref 1.2–4.8)
LYMPHOCYTES NFR BLD AUTO: 34.6 %
MCH RBC QN AUTO: 30.5 PG (ref 26–34)
MCHC RBC AUTO-ENTMCNC: 35 G/DL (ref 32–36)
MCV RBC AUTO: 87 FL (ref 80–100)
MONOCYTES # BLD AUTO: 0.69 X10*3/UL (ref 0.1–1)
MONOCYTES NFR BLD AUTO: 7.4 %
NEUTROPHILS # BLD AUTO: 5.24 X10*3/UL (ref 1.2–7.7)
NEUTROPHILS NFR BLD AUTO: 56 %
NRBC BLD-RTO: 0 /100 WBCS (ref 0–0)
PLATELET # BLD AUTO: 239 X10*3/UL (ref 150–450)
POTASSIUM SERPL-SCNC: 3.8 MMOL/L (ref 3.5–5.3)
PROT SERPL-MCNC: 7.3 G/DL (ref 6.4–8.2)
RBC # BLD AUTO: 4.75 X10*6/UL (ref 4–5.9)
SODIUM SERPL-SCNC: 136 MMOL/L (ref 136–145)
WBC # BLD AUTO: 9.4 X10*3/UL (ref 4.4–11.3)

## 2024-12-04 PROCEDURE — 74177 CT ABD & PELVIS W/CONTRAST: CPT | Performed by: RADIOLOGY

## 2024-12-04 PROCEDURE — 93005 ELECTROCARDIOGRAM TRACING: CPT

## 2024-12-04 PROCEDURE — 99285 EMERGENCY DEPT VISIT HI MDM: CPT | Mod: 25

## 2024-12-04 PROCEDURE — 80053 COMPREHEN METABOLIC PANEL: CPT | Performed by: NURSE PRACTITIONER

## 2024-12-04 PROCEDURE — 99284 EMERGENCY DEPT VISIT MOD MDM: CPT | Mod: 25

## 2024-12-04 PROCEDURE — 83605 ASSAY OF LACTIC ACID: CPT | Performed by: NURSE PRACTITIONER

## 2024-12-04 PROCEDURE — 2500000004 HC RX 250 GENERAL PHARMACY W/ HCPCS (ALT 636 FOR OP/ED): Performed by: NURSE PRACTITIONER

## 2024-12-04 PROCEDURE — 96374 THER/PROPH/DIAG INJ IV PUSH: CPT | Mod: 59

## 2024-12-04 PROCEDURE — 36415 COLL VENOUS BLD VENIPUNCTURE: CPT | Performed by: NURSE PRACTITIONER

## 2024-12-04 PROCEDURE — 74177 CT ABD & PELVIS W/CONTRAST: CPT

## 2024-12-04 PROCEDURE — 2550000001 HC RX 255 CONTRASTS: Performed by: NURSE PRACTITIONER

## 2024-12-04 PROCEDURE — 96375 TX/PRO/DX INJ NEW DRUG ADDON: CPT

## 2024-12-04 PROCEDURE — 85025 COMPLETE CBC W/AUTO DIFF WBC: CPT | Performed by: NURSE PRACTITIONER

## 2024-12-04 PROCEDURE — 96361 HYDRATE IV INFUSION ADD-ON: CPT

## 2024-12-04 RX ORDER — ONDANSETRON HYDROCHLORIDE 2 MG/ML
4 INJECTION, SOLUTION INTRAVENOUS ONCE
Status: COMPLETED | OUTPATIENT
Start: 2024-12-04 | End: 2024-12-04

## 2024-12-04 RX ORDER — MORPHINE SULFATE 4 MG/ML
4 INJECTION INTRAVENOUS ONCE
Status: COMPLETED | OUTPATIENT
Start: 2024-12-04 | End: 2024-12-04

## 2024-12-04 RX ORDER — KETOROLAC TROMETHAMINE 30 MG/ML
15 INJECTION, SOLUTION INTRAMUSCULAR; INTRAVENOUS ONCE
Status: COMPLETED | OUTPATIENT
Start: 2024-12-04 | End: 2024-12-04

## 2024-12-04 RX ADMIN — SODIUM CHLORIDE 500 ML: 9 INJECTION, SOLUTION INTRAVENOUS at 22:30

## 2024-12-04 RX ADMIN — MORPHINE SULFATE 4 MG: 4 INJECTION INTRAVENOUS at 22:32

## 2024-12-04 RX ADMIN — ONDANSETRON 4 MG: 2 INJECTION INTRAMUSCULAR; INTRAVENOUS at 22:31

## 2024-12-04 RX ADMIN — IOHEXOL 75 ML: 350 INJECTION, SOLUTION INTRAVENOUS at 22:51

## 2024-12-04 RX ADMIN — KETOROLAC TROMETHAMINE 15 MG: 30 INJECTION, SOLUTION INTRAMUSCULAR at 22:31

## 2024-12-04 ASSESSMENT — PAIN - FUNCTIONAL ASSESSMENT: PAIN_FUNCTIONAL_ASSESSMENT: 0-10

## 2024-12-04 ASSESSMENT — PAIN DESCRIPTION - FREQUENCY: FREQUENCY: CONSTANT/CONTINUOUS

## 2024-12-04 ASSESSMENT — PAIN DESCRIPTION - LOCATION: LOCATION: BACK

## 2024-12-04 ASSESSMENT — PAIN SCALES - GENERAL
PAINLEVEL_OUTOF10: 9
PAINLEVEL_OUTOF10: 9

## 2024-12-04 ASSESSMENT — VISUAL ACUITY: OU: 1

## 2024-12-04 ASSESSMENT — PAIN DESCRIPTION - ORIENTATION: ORIENTATION: LEFT;LOWER

## 2024-12-04 ASSESSMENT — PAIN DESCRIPTION - PAIN TYPE: TYPE: ACUTE PAIN

## 2024-12-04 ASSESSMENT — PAIN DESCRIPTION - DESCRIPTORS: DESCRIPTORS: SHARP

## 2024-12-05 ENCOUNTER — TELEPHONE (OUTPATIENT)
Dept: PRIMARY CARE | Facility: CLINIC | Age: 34
End: 2024-12-05
Payer: MEDICAID

## 2024-12-05 VITALS
DIASTOLIC BLOOD PRESSURE: 82 MMHG | TEMPERATURE: 97.7 F | WEIGHT: 250 LBS | HEIGHT: 72 IN | BODY MASS INDEX: 33.86 KG/M2 | SYSTOLIC BLOOD PRESSURE: 144 MMHG | OXYGEN SATURATION: 100 % | HEART RATE: 59 BPM | RESPIRATION RATE: 16 BRPM

## 2024-12-05 LAB
APPEARANCE UR: CLEAR
ATRIAL RATE: 52 BPM
BILIRUB UR STRIP.AUTO-MCNC: NEGATIVE MG/DL
COLOR UR: NORMAL
GLUCOSE UR STRIP.AUTO-MCNC: NORMAL MG/DL
HOLD SPECIMEN: NORMAL
KETONES UR STRIP.AUTO-MCNC: NEGATIVE MG/DL
LEUKOCYTE ESTERASE UR QL STRIP.AUTO: NEGATIVE
MUCOUS THREADS #/AREA URNS AUTO: ABNORMAL /LPF
NITRITE UR QL STRIP.AUTO: NEGATIVE
P AXIS: 41 DEGREES
PH UR STRIP.AUTO: 6.5 [PH]
PR INTERVAL: 183 MS
PROT UR STRIP.AUTO-MCNC: NORMAL MG/DL
Q ONSET: 249 MS
QRS COUNT: 9 BEATS
QRS DURATION: 106 MS
QT INTERVAL: 444 MS
QTC CALCULATION(BAZETT): 413 MS
QTC FREDERICIA: 423 MS
R AXIS: 61 DEGREES
RBC # UR STRIP.AUTO: NEGATIVE /UL
RBC #/AREA URNS AUTO: ABNORMAL /HPF
SP GR UR STRIP.AUTO: 1.02
T AXIS: 59 DEGREES
T OFFSET: 471 MS
UROBILINOGEN UR STRIP.AUTO-MCNC: NORMAL MG/DL
VENTRICULAR RATE: 52 BPM
WBC #/AREA URNS AUTO: ABNORMAL /HPF
YEAST BUDDING #/AREA UR COMP ASSIST: PRESENT /HPF

## 2024-12-05 PROCEDURE — 81001 URINALYSIS AUTO W/SCOPE: CPT | Performed by: NURSE PRACTITIONER

## 2024-12-05 NOTE — ED PROVIDER NOTES
"    HPI   Chief Complaint   Patient presents with    Back Pain     1200 began with sharp lower  back pain mid line to lt side   denies radiation around to ABD c/o vomiting and \"passing out \" when pain gets bad  states has kidney stones but does not remember which side denies urinary symptoms  Tylenol 1200  muscle relaxer 1100        Patient presents the emergency department for evaluation of sharp low back pain that began around noon today.  Patient reports the pain to be just left of the midline in her mid back and feeling similar to previous kidney stone.  She is having nausea, pain that is constant but gets so bad with vomiting that it caused her to pass out at 1 point.  She took a muscle relaxer and Tylenol earlier today but it did not change her pain.  She does not appreciate any traumatic head injury, chest pain, shortness of breath, hematemesis, black or bloody stools, dysuria, hematuria, urinary frequency, urgency, penile discharge, calf pain, leg swelling or rash.  She has had no loss of bowel or bladder control, saddle paresthesia or surgical intervention of the abdomen as she is transgender pending surgical intervention for this.  Her symptoms are moderate in severity and persistent nature.      History provided by:  Patient and friend   used: No                          Jayshree Coma Scale Score: 15                  Patient History   Past Medical History:   Diagnosis Date    ADHD (attention deficit hyperactivity disorder)     Anxiety     Chondromalacia patellae, right knee 10/24/2019    Chondromalacia of right patella    Chronic low back pain 04/13/2023    Depression     Dorsalgia, unspecified 06/24/2020    Acute back pain    Flushing 01/06/2021    Flushing    Generalized abdominal pain 11/02/2021    Abdominal pain, generalized    Local infection of the skin and subcutaneous tissue, unspecified 10/06/2020    Skin infection, bacterial    Nausea 03/10/2021    Nausea in adult    Other " forms of dyspnea 03/08/2018    Dyspnea on exertion    Other hemorrhoids 07/15/2021    Internal hemorrhoids    Other skin changes 07/08/2021    Skin irritation    Otitis media, unspecified, left ear 05/01/2020    Acute otitis media, left    Personal history of COVID-19     Personal history of COVID-19    Personal history of diseases of the skin and subcutaneous tissue 03/10/2021    History of dermatitis    Personal history of other diseases of the respiratory system 10/06/2021    History of sore throat    Personal history of other diseases of the respiratory system 10/06/2021    History of laryngitis    Personal history of other specified conditions 08/17/2021    History of dysphagia    Personal history of other specified conditions 05/03/2018    History of palpitations    Personal history of other specified conditions 05/03/2018    History of exertional chest pain    Personal history of other specified conditions 05/03/2018    History of syncope    Seasonal asthma ([facility])      Past Surgical History:   Procedure Laterality Date    INNER EAR SURGERY  03/08/2018    Inner Ear Surgery    OTHER SURGICAL HISTORY  04/05/2022    Oral surgery    OTHER SURGICAL HISTORY  08/06/2021    Esophagogastroduodenoscopy    OTHER SURGICAL HISTORY  08/06/2021    Colonoscopy    TONSILLECTOMY  03/08/2018    Tonsillectomy     Family History   Problem Relation Name Age of Onset    Other (cva) Other      Epilepsy Other      Ulcerative colitis Other      Hypertension Other       Social History     Tobacco Use    Smoking status: Never     Passive exposure: Never    Smokeless tobacco: Never   Vaping Use    Vaping status: Never Used   Substance Use Topics    Alcohol use: Not Currently     Comment: very rare    Drug use: Yes     Types: Marijuana     Comment: ocassional edibles       Physical Exam   Visit Vitals  /80 (BP Location: Right arm, Patient Position: Sitting)   Pulse 67   Temp 36.9 °C (98.5 °F) (Oral)   Resp 20   Ht 1.829 m (6')    Wt 113 kg (250 lb)   SpO2 99%   BMI 33.91 kg/m²   Smoking Status Never   BSA 2.4 m²      Physical Exam  Vitals reviewed.   Constitutional:       Appearance: Normal appearance.   HENT:      Head: Normocephalic and atraumatic.      Right Ear: Hearing and external ear normal.      Left Ear: Hearing and external ear normal.      Nose: Nose normal.      Mouth/Throat:      Lips: Pink.      Mouth: Mucous membranes are moist.      Pharynx: Oropharynx is clear.   Eyes:      General: Vision grossly intact. No scleral icterus.  Cardiovascular:      Rate and Rhythm: Normal rate and regular rhythm.      Pulses:           Radial pulses are 2+ on the left side.      Heart sounds: No murmur heard.     Comments: No resting tachycardia  Pulmonary:      Effort: Pulmonary effort is normal.      Breath sounds: Normal breath sounds. No wheezing or rhonchi.   Abdominal:      General: Bowel sounds are normal.      Palpations: Abdomen is soft.      Tenderness: There is abdominal tenderness in the suprapubic area and left lower quadrant. There is guarding. There is no right CVA tenderness or left CVA tenderness.      Comments: Left flank   Genitourinary:     Comments:  exam declined   Musculoskeletal:      Cervical back: Normal range of motion and neck supple.      Comments: Moves all extremities randomly. Neurovascularly intact.  No midline vertebral tenderness, step-off or crepitus.  No outward sign of trauma or vesicular rash.   Skin:     General: Skin is warm and dry.      Capillary Refill: Capillary refill takes less than 2 seconds.      Coloration: Skin is not cyanotic, jaundiced or pale.      Findings: No wound.   Neurological:      Mental Status: She is alert and oriented to person, place, and time.      Sensory: Sensation is intact.      Motor: Motor function is intact.      Coordination: Coordination is intact.      Gait: Gait is intact.      Comments: Clear speech.  No facial asymmetry.  Hand grasps, pushes, pulls,  dorsiflexion and plantarflexion strong and equal bilaterally.         CT abdomen pelvis w IV contrast   Final Result   Symmetric enhancement of the kidneys without hydronephrosis.        No significant free abdominal or pelvic fluid.        Hiatal hernia.        MACRO:   None        Signed by: Brent Couch 12/4/2024 11:35 PM   Dictation workstation:   [id number]          Labs Reviewed   COMPREHENSIVE METABOLIC PANEL - Abnormal       Result Value    Glucose 69 (*)     Sodium 136      Potassium 3.8      Chloride 105      Bicarbonate 24      Anion Gap 11      Urea Nitrogen 11      Creatinine 1.07      eGFR 70      Calcium 9.3      Albumin 4.4      Alkaline Phosphatase 69      Total Protein 7.3      AST 17      Bilirubin, Total 0.4      ALT 17     URINALYSIS MICROSCOPIC WITH REFLEX CULTURE - Abnormal    WBC, Urine NONE      RBC, Urine NONE      Budding Yeast, Urine PRESENT (*)     Mucus, Urine 2+     LACTATE - Normal    Lactate 0.7      Narrative:     Venipuncture immediately after or during the administration of Metamizole may lead to falsely low results. Testing should be performed immediately prior to Metamizole dosing.   URINALYSIS WITH REFLEX CULTURE AND MICROSCOPIC - Normal    Color, Urine Light-Yellow      Appearance, Urine Clear      Specific Gravity, Urine 1.020      pH, Urine 6.5      Protein, Urine 10 (TRACE)      Glucose, Urine Normal      Blood, Urine NEGATIVE      Ketones, Urine NEGATIVE      Bilirubin, Urine NEGATIVE      Urobilinogen, Urine Normal      Nitrite, Urine NEGATIVE      Leukocyte Esterase, Urine NEGATIVE     CBC WITH AUTO DIFFERENTIAL    WBC 9.4      nRBC 0.0      RBC 4.75      Hemoglobin 14.5      Hematocrit 41.4      MCV 87      MCH 30.5      MCHC 35.0      RDW 12.7      Platelets 239      Neutrophils % 56.0      Immature Granulocytes %, Automated 0.3      Lymphocytes % 34.6      Monocytes % 7.4      Eosinophils % 1.1      Basophils % 0.6      Neutrophils Absolute 5.24      Immature  Granulocytes Absolute, Automated 0.03      Lymphocytes Absolute 3.24      Monocytes Absolute 0.69      Eosinophils Absolute 0.10      Basophils Absolute 0.06     URINALYSIS WITH REFLEX CULTURE AND MICROSCOPIC    Narrative:     The following orders were created for panel order Urinalysis with Reflex Culture and Microscopic.  Procedure                               Abnormality         Status                     ---------                               -----------         ------                     Urinalysis with Reflex C...[[id number]]  Normal              Final result               Extra Urine Gray Tube[[id number]]                            In process                   Please view results for these tests on the individual orders.   EXTRA URINE GRAY TUBE       ED Course & MDM     Medical Decision Making  Presents to the emergency department for evaluation of back pain and left lower quadrant abdominal pain.  Differential diagnosis of musculoskeletal back pain, ureteral lithiasis and constipation to mention a few.  Plan is for CT imaging of the abdomen pelvis, baseline labs, urinalysis and symptom management of Toradol, morphine and Zofran with IV fluids.  Patient provides consent.        ED Course as of 12/05/24 0044   Wed Dec 04, 2024   2149 EKG as interpreted by physician negative for STEMI.  As interpreted by myself shows sinus bradycardia, heart rate 52 bpm, QTc 413 ms, normal P axis. [NA]   2235 GLUCOSE(!): 69 [NA]   2235 Creatinine: 1.07 [NA]   2235 WBC: 9.4 [NA]   2235 Lactate: 0.7 [NA]   Thu Dec 05, 2024   0005 Patient updated on CT results.  They are feeling much better however states they are unable to urinate yet.  Fluid bolus is recently started and just now infusing.  Plan is to treat as musculoskeletal back pain in which they have an adequate supply of muscle relaxant, anti-inflammatory and voice no need for refills.  Will ensure no sign of UTI on urinalysis prior to outpatient management and discharge.   Patient requesting to eat. [NA]   0042 Urinalysis is above with no blood or bacteria nor white blood cells suggestive of recent passage of a stone.  We elect not to treat budding yeast and there is no concern for STI.  Plan is for continuation of their muscle relaxant, anti-inflammatory and range of motion exercises. Patient is non-toxic, not hypoxic and appropriate for this outpatient management plan which they prefer. Encouragement to arrange close follow up was discussed as well as provided in a written handout of discharge instructions. Patient was educated on signs of symptoms to watch for indicative of re-evaluation in the emergency department setting to include any worsening of current symptoms. They verbalized understanding of instructions and is amenable to this treatment plan with no social determinants of health that would obscure this plan. Patient departed in stable condition.  [NA]      ED Course User Index  [NA] Millie Stallings, APRN-CNP         Diagnoses as of 12/05/24 0044   Lumbar strain, initial encounter          Your medication list        ASK your doctor about these medications        Instructions Last Dose Given Next Dose Due   ARIPiprazole 20 mg tablet  Commonly known as: Abilify           bicalutamide 50 mg tablet  Commonly known as: Casodex      Take 1 tablet (50 mg total) by mouth once daily.       cetirizine 10 mg tablet  Commonly known as: ZyrTEC      Take 1 tablet (10 mg) by mouth once daily. Allergy medication       estradioL 1.25 mg/1.25 gram (0.1 %) gel in packet      Place 3.75 mg on the skin once daily. (3 packets)       famotidine 40 mg tablet  Commonly known as: Pepcid      Take 1 tablet (40 mg) by mouth 2 times a day.       hydrocortisone 2.5 % rectal cream  Commonly known as: Anusol-HC      Insert into the rectum 2 times a day for 14 days.       hydrOXYzine pamoate 25 mg capsule  Commonly known as: Vistaril           lamoTRIgine 150 mg tablet  Commonly known as: LaMICtal            levETIRAcetam 750 mg tablet  Commonly known as: Keppra           prazosin 1 mg capsule  Commonly known as: Minipress           progesterone 200 mg capsule  Commonly known as: Prometrium      Take 1 capsule (200 mg) by mouth once daily.       tiZANidine 2 mg tablet  Commonly known as: Zanaflex      Take 1 tablet (2 mg) by mouth 2 times a day as needed for muscle spasms.       traZODone 100 mg tablet  Commonly known as: Desyrel           zonisamide 100 mg capsule  Commonly known as: Zonegran                    Procedure  None     *This report was transcribed using voice recognition software.  Every effort was made to ensure accuracy; however, inadvertent computerized transcription errors may be present.*  DAMION Elizalde-LÁZARO  12/05/24         DAMION Elizalde-LÁZARO  12/05/24 0044

## 2024-12-05 NOTE — TELEPHONE ENCOUNTER
Patient called in stating that she was in the ED last night for severe back pain. Patient states that they weren't clear on if she should go back to work today or take the day off. Patient states they just told her to follow up with her pcp. Please advise.

## 2024-12-05 NOTE — TELEPHONE ENCOUNTER
If she is feeling well enough to return to work she can return to work.  If not, I can write her a work excuse.   Thanks,  Minda Veliz, DO

## 2024-12-14 LAB
ATRIAL RATE: 52 BPM
P AXIS: 41 DEGREES
PR INTERVAL: 183 MS
Q ONSET: 249 MS
QRS COUNT: 9 BEATS
QRS DURATION: 106 MS
QT INTERVAL: 444 MS
QTC CALCULATION(BAZETT): 413 MS
QTC FREDERICIA: 423 MS
R AXIS: 61 DEGREES
T AXIS: 59 DEGREES
T OFFSET: 471 MS
VENTRICULAR RATE: 52 BPM

## 2024-12-27 ENCOUNTER — APPOINTMENT (OUTPATIENT)
Dept: RADIOLOGY | Facility: HOSPITAL | Age: 34
End: 2024-12-27
Payer: MEDICAID

## 2024-12-27 ENCOUNTER — HOSPITAL ENCOUNTER (EMERGENCY)
Facility: HOSPITAL | Age: 34
Discharge: HOME | End: 2024-12-27
Attending: EMERGENCY MEDICINE
Payer: MEDICAID

## 2024-12-27 ENCOUNTER — APPOINTMENT (OUTPATIENT)
Dept: CARDIOLOGY | Facility: HOSPITAL | Age: 34
End: 2024-12-27
Payer: MEDICAID

## 2024-12-27 VITALS
DIASTOLIC BLOOD PRESSURE: 78 MMHG | HEIGHT: 72 IN | TEMPERATURE: 97.7 F | BODY MASS INDEX: 36.57 KG/M2 | WEIGHT: 270 LBS | OXYGEN SATURATION: 100 % | RESPIRATION RATE: 16 BRPM | SYSTOLIC BLOOD PRESSURE: 136 MMHG | HEART RATE: 71 BPM

## 2024-12-27 DIAGNOSIS — R55 SYNCOPE, UNSPECIFIED SYNCOPE TYPE: Primary | ICD-10-CM

## 2024-12-27 LAB
ALBUMIN SERPL BCP-MCNC: 4.2 G/DL (ref 3.4–5)
ALP SERPL-CCNC: 60 U/L (ref 33–120)
ALT SERPL W P-5'-P-CCNC: 17 U/L (ref 7–52)
ANION GAP SERPL CALC-SCNC: 11 MMOL/L (ref 10–20)
AST SERPL W P-5'-P-CCNC: 15 U/L (ref 9–39)
BASOPHILS # BLD AUTO: 0.06 X10*3/UL (ref 0–0.1)
BASOPHILS NFR BLD AUTO: 0.6 %
BILIRUB SERPL-MCNC: 0.3 MG/DL (ref 0–1.2)
BUN SERPL-MCNC: 14 MG/DL (ref 6–23)
CALCIUM SERPL-MCNC: 8.9 MG/DL (ref 8.6–10.3)
CARDIAC TROPONIN I PNL SERPL HS: <3 NG/L (ref 0–20)
CHLORIDE SERPL-SCNC: 105 MMOL/L (ref 98–107)
CO2 SERPL-SCNC: 25 MMOL/L (ref 21–32)
CREAT SERPL-MCNC: 1.2 MG/DL (ref 0.5–1.3)
EGFRCR SERPLBLD CKD-EPI 2021: 61 ML/MIN/1.73M*2
EOSINOPHIL # BLD AUTO: 0.17 X10*3/UL (ref 0–0.7)
EOSINOPHIL NFR BLD AUTO: 1.8 %
ERYTHROCYTE [DISTWIDTH] IN BLOOD BY AUTOMATED COUNT: 12.5 % (ref 11.5–14.5)
FLUAV RNA RESP QL NAA+PROBE: NOT DETECTED
FLUBV RNA RESP QL NAA+PROBE: NOT DETECTED
GLUCOSE BLD MANUAL STRIP-MCNC: 72 MG/DL (ref 74–99)
GLUCOSE SERPL-MCNC: 74 MG/DL (ref 74–99)
HCT VFR BLD AUTO: 41.4 % (ref 36–52)
HGB BLD-MCNC: 14.4 G/DL (ref 12–17.5)
IMM GRANULOCYTES # BLD AUTO: 0.03 X10*3/UL (ref 0–0.7)
IMM GRANULOCYTES NFR BLD AUTO: 0.3 % (ref 0–0.9)
LYMPHOCYTES # BLD AUTO: 2.74 X10*3/UL (ref 1.2–4.8)
LYMPHOCYTES NFR BLD AUTO: 29.6 %
MCH RBC QN AUTO: 30.3 PG (ref 26–34)
MCHC RBC AUTO-ENTMCNC: 34.8 G/DL (ref 32–36)
MCV RBC AUTO: 87 FL (ref 80–100)
MONOCYTES # BLD AUTO: 0.66 X10*3/UL (ref 0.1–1)
MONOCYTES NFR BLD AUTO: 7.1 %
NEUTROPHILS # BLD AUTO: 5.6 X10*3/UL (ref 1.2–7.7)
NEUTROPHILS NFR BLD AUTO: 60.6 %
NRBC BLD-RTO: 0 /100 WBCS (ref 0–0)
PLATELET # BLD AUTO: 228 X10*3/UL (ref 150–450)
POTASSIUM SERPL-SCNC: 3.9 MMOL/L (ref 3.5–5.3)
PROT SERPL-MCNC: 6.9 G/DL (ref 6.4–8.2)
RBC # BLD AUTO: 4.75 X10*6/UL (ref 4–5.9)
SARS-COV-2 RNA RESP QL NAA+PROBE: NOT DETECTED
SODIUM SERPL-SCNC: 137 MMOL/L (ref 136–145)
WBC # BLD AUTO: 9.3 X10*3/UL (ref 4.4–11.3)

## 2024-12-27 PROCEDURE — 73564 X-RAY EXAM KNEE 4 OR MORE: CPT | Mod: LEFT SIDE | Performed by: RADIOLOGY

## 2024-12-27 PROCEDURE — 2500000004 HC RX 250 GENERAL PHARMACY W/ HCPCS (ALT 636 FOR OP/ED): Performed by: EMERGENCY MEDICINE

## 2024-12-27 PROCEDURE — 99285 EMERGENCY DEPT VISIT HI MDM: CPT | Mod: 25 | Performed by: EMERGENCY MEDICINE

## 2024-12-27 PROCEDURE — 80053 COMPREHEN METABOLIC PANEL: CPT | Performed by: EMERGENCY MEDICINE

## 2024-12-27 PROCEDURE — 82947 ASSAY GLUCOSE BLOOD QUANT: CPT

## 2024-12-27 PROCEDURE — 96360 HYDRATION IV INFUSION INIT: CPT

## 2024-12-27 PROCEDURE — 84484 ASSAY OF TROPONIN QUANT: CPT | Performed by: EMERGENCY MEDICINE

## 2024-12-27 PROCEDURE — 36415 COLL VENOUS BLD VENIPUNCTURE: CPT | Performed by: EMERGENCY MEDICINE

## 2024-12-27 PROCEDURE — 73564 X-RAY EXAM KNEE 4 OR MORE: CPT | Mod: LT

## 2024-12-27 PROCEDURE — 85025 COMPLETE CBC W/AUTO DIFF WBC: CPT | Performed by: EMERGENCY MEDICINE

## 2024-12-27 PROCEDURE — 93005 ELECTROCARDIOGRAM TRACING: CPT

## 2024-12-27 PROCEDURE — 87636 SARSCOV2 & INF A&B AMP PRB: CPT | Performed by: EMERGENCY MEDICINE

## 2024-12-27 PROCEDURE — 80177 DRUG SCRN QUAN LEVETIRACETAM: CPT | Mod: PORLAB | Performed by: EMERGENCY MEDICINE

## 2024-12-27 PROCEDURE — 82947 ASSAY GLUCOSE BLOOD QUANT: CPT | Mod: 59

## 2024-12-27 PROCEDURE — 70450 CT HEAD/BRAIN W/O DYE: CPT

## 2024-12-27 PROCEDURE — 70450 CT HEAD/BRAIN W/O DYE: CPT | Performed by: STUDENT IN AN ORGANIZED HEALTH CARE EDUCATION/TRAINING PROGRAM

## 2024-12-27 RX ORDER — ONDANSETRON 4 MG/1
4 TABLET, ORALLY DISINTEGRATING ORAL EVERY 8 HOURS PRN
Qty: 10 TABLET | Refills: 0 | Status: SHIPPED | OUTPATIENT
Start: 2024-12-27 | End: 2025-01-06

## 2024-12-27 RX ADMIN — SODIUM CHLORIDE 1000 ML: 9 INJECTION, SOLUTION INTRAVENOUS at 18:08

## 2024-12-27 ASSESSMENT — PAIN - FUNCTIONAL ASSESSMENT: PAIN_FUNCTIONAL_ASSESSMENT: 0-10

## 2024-12-27 ASSESSMENT — LIFESTYLE VARIABLES
HAVE YOU EVER FELT YOU SHOULD CUT DOWN ON YOUR DRINKING: NO
TOTAL SCORE: 0
EVER HAD A DRINK FIRST THING IN THE MORNING TO STEADY YOUR NERVES TO GET RID OF A HANGOVER: NO
HAVE PEOPLE ANNOYED YOU BY CRITICIZING YOUR DRINKING: NO
EVER FELT BAD OR GUILTY ABOUT YOUR DRINKING: NO

## 2024-12-27 ASSESSMENT — COLUMBIA-SUICIDE SEVERITY RATING SCALE - C-SSRS
1. IN THE PAST MONTH, HAVE YOU WISHED YOU WERE DEAD OR WISHED YOU COULD GO TO SLEEP AND NOT WAKE UP?: NO
2. HAVE YOU ACTUALLY HAD ANY THOUGHTS OF KILLING YOURSELF?: NO
6. HAVE YOU EVER DONE ANYTHING, STARTED TO DO ANYTHING, OR PREPARED TO DO ANYTHING TO END YOUR LIFE?: NO

## 2024-12-27 ASSESSMENT — PAIN DESCRIPTION - LOCATION: LOCATION: KNEE

## 2024-12-27 ASSESSMENT — PAIN SCALES - GENERAL: PAINLEVEL_OUTOF10: 4

## 2024-12-27 ASSESSMENT — PAIN DESCRIPTION - ORIENTATION: ORIENTATION: LEFT

## 2024-12-27 ASSESSMENT — PAIN DESCRIPTION - PAIN TYPE: TYPE: ACUTE PAIN

## 2024-12-27 NOTE — ED TRIAGE NOTES
Pt presents for a fall today in the kitchen. Unknown if they hit his head but he states that she has had intermittent HA. Complains of left knee pain. Pt states that they passed out and then fell. C/O dizziness and states it has been hard to focus all day. Pt alert and oriented x's 4. Ambulatory.

## 2024-12-27 NOTE — ED PROVIDER NOTES
HPI   Chief Complaint   Patient presents with    Syncope       HPI    HISTORY OF PRESENT ILLNESS:  Patient is a 34-year-old history of bipolar, male to female transition, seizures on Keppra presents emergency department for syncope.  Patient had woken up this morning feeling lightheaded.  Patient was having increasing amounts of confusion.  Decreased oral intake.  Denied any cough, abdominal pain, chest pain.  While in the kitchen, she had a witnessed syncope.  No seizure-like activity.  Immediately woke up after falling.  The patient currently feels back to normal baseline state.  Is not on anticoagulation.  Did take seizure medications.  Seizures are well-controlled, approximately 2-4 seizures in a year, triggered by flashing lights.  Patient is on a transdermal estrogen product.  Deny prior history of pulmonary embolism, DVT, no recent travel or leg swelling.    Past Medical History: As noted history above    __________________________________________________________  PHYSICAL EXAM:    Appearance: Alert, oriented , cooperative   Skin: Intact,  dry skin, no lesions, rash, petechiae or purpura.   Eyes: PERRLA, EOMs intact,  Conjunctiva pink with no redness or exudates.    HENT: Normocephalic, atraumatic. Nares patent   Neck: Supple. Trachea at midline.   Pulmonary: Lung sounds are clear bilaterally.  There is no rales, rhonchi, or wheezing.  Cardiac: Regular rate and rhythm, no rubs, murmurs, or gallops. No JVD,   Abdomen: Abdomen is soft, nontender, and nondistended.  No palpable organomegaly.  No rebound or guarding.  No CVA tenderness. Nonsurgical abdomen  Genitourinary: Exam deferred.  Musculoskeletal: no edema, pain, cyanosis, or deformity in extremities. Pulses full and equal.   Neurological:  Cranial nerves are grossly intact, grossly normal sensation, no weakness, no focal findings identified.    __________________________________________________________  MEDICAL DECISION MAKING:    Patient was seen and  examined. Differential diagnosis for lightheadedness includes viral illness, ACS, arrhythmia.  Patient woke up feeling lightheaded.  She had a fall today.  Patient did have a syncope where she had been witnessed.  She did hit her head.  Had immediately woken up.  Left knee the did have some tenderness but knee exam overall stable.  Orthostats will be obtained in addition to IV fluids.  PE considered but given lack of prior history, low Wells score, no hypoxia or tachycardia, and estrogen hormone has been administered transdermal and not oral, patient unlikely to have PE.  Cardiac labs unremarkable.  Patient CT head imaging was negative.  Independently reviewed the CT head scan did not note any obvious intracranial bleed.  Patient reevaluated afterwards.  Was encouraged oral intake.  Was given return precautions and primary care follow-up.  Patient verbalized understanding, agreed to plan, the patient was discharged home    Considered CT C-spine but patient cleared by Nexus criteria.    Chronic Medical Conditions Significantly Affecting Care: History of seizures, bipolar disorder, male to female transition    [facility]  Emergency Medicine    Patient History   Past Medical History:   Diagnosis Date    ADHD (attention deficit hyperactivity disorder)     Anxiety     Chondromalacia patellae, right knee 10/24/2019    Chondromalacia of right patella    Chronic low back pain 04/13/2023    Depression     Dorsalgia, unspecified 06/24/2020    Acute back pain    Flushing 01/06/2021    Flushing    Generalized abdominal pain 11/02/2021    Abdominal pain, generalized    Local infection of the skin and subcutaneous tissue, unspecified 10/06/2020    Skin infection, bacterial    Nausea 03/10/2021    Nausea in adult    Other forms of dyspnea 03/08/2018    Dyspnea on exertion    Other hemorrhoids 07/15/2021    Internal hemorrhoids    Other skin changes 07/08/2021    Skin irritation    Otitis media, unspecified, left ear 05/01/2020     Acute otitis media, left    Personal history of COVID-19     Personal history of COVID-19    Personal history of diseases of the skin and subcutaneous tissue 03/10/2021    History of dermatitis    Personal history of other diseases of the respiratory system 10/06/2021    History of sore throat    Personal history of other diseases of the respiratory system 10/06/2021    History of laryngitis    Personal history of other specified conditions 08/17/2021    History of dysphagia    Personal history of other specified conditions 05/03/2018    History of palpitations    Personal history of other specified conditions 05/03/2018    History of exertional chest pain    Personal history of other specified conditions 05/03/2018    History of syncope    Seasonal asthma ([facility])      Past Surgical History:   Procedure Laterality Date    INNER EAR SURGERY  03/08/2018    Inner Ear Surgery    OTHER SURGICAL HISTORY  04/05/2022    Oral surgery    OTHER SURGICAL HISTORY  08/06/2021    Esophagogastroduodenoscopy    OTHER SURGICAL HISTORY  08/06/2021    Colonoscopy    TONSILLECTOMY  03/08/2018    Tonsillectomy     Family History   Problem Relation Name Age of Onset    Other (cva) Other      Epilepsy Other      Ulcerative colitis Other      Hypertension Other       Social History     Tobacco Use    Smoking status: Never     Passive exposure: Never    Smokeless tobacco: Never   Vaping Use    Vaping status: Never Used   Substance Use Topics    Alcohol use: Not Currently     Comment: very rare    Drug use: Yes     Types: Marijuana     Comment: ocassional edibles       Physical Exam   ED Triage Vitals [12/27/24 1625]   Temperature Heart Rate Respirations BP   36.5 °C (97.7 °F) 71 16 136/78      Pulse Ox Temp Source Heart Rate Source Patient Position   100 % Skin -- --      BP Location FiO2 (%)     -- --       Physical Exam      ED Course & MDM   ED Course as of 12/28/24 1157   Fri Dec 27, 2024   1749 Patient twelve-lead EKG interpreted  by myself shows sinus rhythm, sugar 63, normal SD interval, normal axis, normal QRS duration, normal QT, no STEMI [WJ]   2030 GLUCOSE: 74 [WJ]      ED Course User Index  [WJ] Cy Mills DO         Diagnoses as of 12/28/24 1157   Syncope, unspecified syncope type                 No data recorded     [place] Coma Scale Score: 15 (12/27/24 1630 : Yumi Jimenez RN)                           Medical Decision Making      Procedure  Procedures     Cy Mills DO  12/28/24 1201

## 2024-12-28 LAB — LEVETIRACETAM SERPL-MCNC: 31 UG/ML (ref 10–40)

## 2024-12-31 DIAGNOSIS — F64.9 GENDER DYSPHORIA: ICD-10-CM

## 2024-12-31 LAB
ATRIAL RATE: 60 BPM
P AXIS: 40 DEGREES
PR INTERVAL: 189 MS
Q ONSET: 251 MS
QRS COUNT: 10 BEATS
QRS DURATION: 96 MS
QT INTERVAL: 403 MS
QTC CALCULATION(BAZETT): 413 MS
QTC FREDERICIA: 409 MS
R AXIS: 57 DEGREES
T AXIS: 57 DEGREES
T OFFSET: 452 MS
VENTRICULAR RATE: 63 BPM

## 2025-01-05 RX ORDER — PROGESTERONE 200 MG/1
200 CAPSULE ORAL DAILY
Qty: 90 CAPSULE | Refills: 1 | Status: SHIPPED | OUTPATIENT
Start: 2025-01-05

## 2025-01-07 DIAGNOSIS — F64.9 GENDER DYSPHORIA: Primary | ICD-10-CM

## 2025-01-08 DIAGNOSIS — M62.89 PELVIC FLOOR TENSION: ICD-10-CM

## 2025-01-08 DIAGNOSIS — Z01.818 PREOPERATIVE CLEARANCE: ICD-10-CM

## 2025-01-08 PROBLEM — F64.9 GENDER DYSPHORIA: Status: ACTIVE | Noted: 2025-01-07

## 2025-01-08 RX ORDER — ACETAMINOPHEN 325 MG/1
975 TABLET ORAL ONCE
OUTPATIENT
Start: 2025-01-08

## 2025-01-08 RX ORDER — CELECOXIB 50 MG/1
200 CAPSULE ORAL ONCE
OUTPATIENT
Start: 2025-01-08

## 2025-01-08 RX ORDER — METRONIDAZOLE 500 MG/100ML
500 INJECTION, SOLUTION INTRAVENOUS ONCE
OUTPATIENT
Start: 2025-01-08

## 2025-01-08 RX ORDER — GABAPENTIN 300 MG/1
600 CAPSULE ORAL ONCE
OUTPATIENT
Start: 2025-01-08

## 2025-01-08 NOTE — TELEPHONE ENCOUNTER
Estradiol cream sent to pharmacy. It needs a prior authorization with her insurance which I submitted.    I put in orders for blood work for her to do about 1 month after starting the cream so we can check her hormone levels.     Thanks,  Minda Veliz, DO     INFECTIOUS DISEASE PROGRESS NOTE    Taylor Chau  84 year old female  MRN : [id number]    Date: 1/8/2025     Attending physician : Favian Rodriguez DO    Reason for consult / chief complaint : UTI, colitis    Interval history : No acute events overnight    Subjective : Looks and feels well.  Denies abdominal pain or urinary symptoms    Vitals : Reviewed  Vitals:    01/08/25 0835   BP: (!) 153/77   Pulse: 73   Resp:    Temp: 97.5 °F (36.4 °C)       Physical Examination :  General : Awake. No acute distress.   HEENT : Head is normocephalic, atraumatic. JESSIE. Oral mucosa is moist. No scleral icterus  Lungs : Clear to auscultation bilaterally. No wheezes, crackles, or rhonchi. No usage of accessory muscles of respiration  Heart : Regular rate and rhythm. No murmurs, gallops, or rubs. No edema in bilateral lower extremities  Abdomen : Soft, positive bowel sounds, Non tender, Non distended. No hepatosplenomegaly  Musculoskeletal : Normal ROM in bilateral shoulder, elbow, hip, knee joints  Skin : No lesions, rashes, or ulcers. No erythema or cyanosis.  Neurological : Cranial nerves 2-12 are grossly non focal. Muscle strength is 5/5 in all extremities.  Psychiatric : Alert and oriented X 3. Normal affect    Medications : Reviewed    Laboratory data :    Recent Labs   Lab 01/08/25  0633 01/07/25  0608 01/06/25  0739 01/06/25  0107   WBC 7.0 6.5 9.7 10.5   HCT 28.3* 27.6* 28.1* 29.9*   HGB 8.9* 8.8* 9.2* 9.7*    184 199 215   SODIUM 139 139 137 136   POTASSIUM 4.3 4.3 3.8 3.5   CHLORIDE 110 112* 107 105   CO2 23 24 23 19*   CALCIUM 8.7 8.8 8.7 9.6   GLUCOSE 83 86 104* 129*   BUN 10 12 17 21*   CREATININE 1.10* 1.06* 1.24* 1.38*   AST 9 <8 <8 9   GPT 9 8 8 10   ALKPT 57 56 63 75   BILIRUBIN 0.3 0.4 0.5 0.6   ALBUMIN 2.4* 2.4* 2.7* 3.2*   LIPA  --   --   --  54       Imaging :   CT abd/pelvis 1/6/25 :   1. Findings suggesting localized colitis involving the distal descending and sigmoid colon.  2. Moderate amount of  stool throughout the colon. Correlate with constipation.  3. Sigmoid colonic diverticulosis without evidence of acute diverticulitis.   4. Cholelithiasis, without evidence of acute cholecystitis.     Microbiology :   Urine 1/6/25 : Pseudomonas aeruginosa     Antimicrobials :   Ceftazidime 1/7 - 1/9     Isolation : None    Assessment / Diagnoses :  Acute UTI ?  - urine culture positive for Pseudomonas aeruginosa  - denies symptoms     2.   Acute gastroenteritis with nausea, vomiting, diarrhea              - stool studies negative              - CT with descending colitis              - symptoms resolved at this time, abdomen benign     3.   Acute kidney injury due to gastrointestinal losses              -Improving with IV fluid resuscitation.     4.   Chronic medical conditions :               - paroxysmal atrial fibrillation              - hypertension              - hypothyroidism              - chronic anemia    Plan / Recommendations :  Conclude Ceftazidime course tomorrow  GI input noted, colonoscopy to be done tomorrow  Remaining issues noted       Madeleine Pederson MD  [place] [facility]  Pager : [phone]  Answering service : [phone]    Communication preferences :  6 AM - 9 PM : Epic chat or pager  9 PM - 6 AM : Call answering service to connect

## 2025-01-10 ENCOUNTER — TELEPHONE (OUTPATIENT)
Dept: UROLOGY | Facility: CLINIC | Age: 35
End: 2025-01-10
Payer: COMMERCIAL

## 2025-01-20 ENCOUNTER — APPOINTMENT (OUTPATIENT)
Dept: UROLOGY | Facility: CLINIC | Age: 35
End: 2025-01-20
Payer: COMMERCIAL

## 2025-01-22 ENCOUNTER — EVALUATION (OUTPATIENT)
Dept: PHYSICAL THERAPY | Facility: CLINIC | Age: 35
End: 2025-01-22
Payer: COMMERCIAL

## 2025-01-22 DIAGNOSIS — M62.89 PELVIC FLOOR TENSION: ICD-10-CM

## 2025-01-22 PROCEDURE — 97110 THERAPEUTIC EXERCISES: CPT | Mod: GP

## 2025-01-22 PROCEDURE — 97535 SELF CARE MNGMENT TRAINING: CPT | Mod: GP

## 2025-01-22 PROCEDURE — 97161 PT EVAL LOW COMPLEX 20 MIN: CPT | Mod: GP

## 2025-01-22 ASSESSMENT — ENCOUNTER SYMPTOMS
DEPRESSION: 0
OCCASIONAL FEELINGS OF UNSTEADINESS: 0
LOSS OF SENSATION IN FEET: 0

## 2025-01-22 ASSESSMENT — PAIN - FUNCTIONAL ASSESSMENT: PAIN_FUNCTIONAL_ASSESSMENT: 0-10
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
## 2025-01-24 DIAGNOSIS — F64.9 GENDER DYSPHORIA: ICD-10-CM

## 2025-01-24 RX ORDER — BICALUTAMIDE 50 MG/1
50 TABLET, FILM COATED ORAL DAILY
Qty: 90 TABLET | Refills: 1 | Status: SHIPPED | OUTPATIENT
Start: 2025-01-24

## 2025-01-27 ENCOUNTER — TREATMENT (OUTPATIENT)
Dept: PHYSICAL THERAPY | Facility: CLINIC | Age: 35
End: 2025-01-27
Payer: COMMERCIAL

## 2025-01-27 ENCOUNTER — HOSPITAL ENCOUNTER (EMERGENCY)
Facility: HOSPITAL | Age: 35
Discharge: HOME | End: 2025-01-27
Attending: EMERGENCY MEDICINE
Payer: COMMERCIAL

## 2025-01-27 ENCOUNTER — APPOINTMENT (OUTPATIENT)
Dept: CARDIOLOGY | Facility: HOSPITAL | Age: 35
End: 2025-01-27
Payer: COMMERCIAL

## 2025-01-27 ENCOUNTER — APPOINTMENT (OUTPATIENT)
Dept: RADIOLOGY | Facility: HOSPITAL | Age: 35
End: 2025-01-27
Payer: COMMERCIAL

## 2025-01-27 VITALS
RESPIRATION RATE: 16 BRPM | WEIGHT: 270 LBS | TEMPERATURE: 97.3 F | HEART RATE: 62 BPM | SYSTOLIC BLOOD PRESSURE: 122 MMHG | BODY MASS INDEX: 36.57 KG/M2 | OXYGEN SATURATION: 100 % | DIASTOLIC BLOOD PRESSURE: 81 MMHG | HEIGHT: 72 IN

## 2025-01-27 DIAGNOSIS — R53.1 GENERALIZED WEAKNESS: Primary | ICD-10-CM

## 2025-01-27 DIAGNOSIS — M62.89 PELVIC FLOOR TENSION: ICD-10-CM

## 2025-01-27 LAB
ALBUMIN SERPL BCP-MCNC: 4.2 G/DL (ref 3.4–5)
ALP SERPL-CCNC: 68 U/L (ref 33–120)
ALT SERPL W P-5'-P-CCNC: 16 U/L (ref 7–52)
ANION GAP SERPL CALC-SCNC: 10 MMOL/L (ref 10–20)
AST SERPL W P-5'-P-CCNC: 14 U/L (ref 9–39)
ATRIAL RATE: 63 BPM
BASOPHILS # BLD AUTO: 0.04 X10*3/UL (ref 0–0.1)
BASOPHILS NFR BLD AUTO: 0.5 %
BILIRUB SERPL-MCNC: 0.3 MG/DL (ref 0–1.2)
BUN SERPL-MCNC: 14 MG/DL (ref 6–23)
CALCIUM SERPL-MCNC: 8.9 MG/DL (ref 8.6–10.3)
CARDIAC TROPONIN I PNL SERPL HS: <3 NG/L (ref 0–20)
CARDIAC TROPONIN I PNL SERPL HS: <3 NG/L (ref 0–20)
CHLORIDE SERPL-SCNC: 108 MMOL/L (ref 98–107)
CO2 SERPL-SCNC: 22 MMOL/L (ref 21–32)
CREAT SERPL-MCNC: 1.17 MG/DL (ref 0.5–1.3)
EGFRCR SERPLBLD CKD-EPI 2021: 63 ML/MIN/1.73M*2
EOSINOPHIL # BLD AUTO: 0.06 X10*3/UL (ref 0–0.7)
EOSINOPHIL NFR BLD AUTO: 0.7 %
ERYTHROCYTE [DISTWIDTH] IN BLOOD BY AUTOMATED COUNT: 12.5 % (ref 11.5–14.5)
GLUCOSE BLD MANUAL STRIP-MCNC: 98 MG/DL (ref 74–99)
GLUCOSE SERPL-MCNC: 99 MG/DL (ref 74–99)
HCT VFR BLD AUTO: 40.2 % (ref 36–52)
HGB BLD-MCNC: 13.7 G/DL (ref 12–17.5)
IMM GRANULOCYTES # BLD AUTO: 0.03 X10*3/UL (ref 0–0.7)
IMM GRANULOCYTES NFR BLD AUTO: 0.4 % (ref 0–0.9)
LYMPHOCYTES # BLD AUTO: 1.59 X10*3/UL (ref 1.2–4.8)
LYMPHOCYTES NFR BLD AUTO: 19.6 %
MCH RBC QN AUTO: 29.7 PG (ref 26–34)
MCHC RBC AUTO-ENTMCNC: 34.1 G/DL (ref 32–36)
MCV RBC AUTO: 87 FL (ref 80–100)
MONOCYTES # BLD AUTO: 0.5 X10*3/UL (ref 0.1–1)
MONOCYTES NFR BLD AUTO: 6.2 %
NEUTROPHILS # BLD AUTO: 5.89 X10*3/UL (ref 1.2–7.7)
NEUTROPHILS NFR BLD AUTO: 72.6 %
NRBC BLD-RTO: 0 /100 WBCS (ref 0–0)
P AXIS: 37 DEGREES
PLATELET # BLD AUTO: 202 X10*3/UL (ref 150–450)
POTASSIUM SERPL-SCNC: 3.7 MMOL/L (ref 3.5–5.3)
PR INTERVAL: 181 MS
PROT SERPL-MCNC: 6.9 G/DL (ref 6.4–8.2)
Q ONSET: 249 MS
QRS COUNT: 11 BEATS
QRS DURATION: 100 MS
QT INTERVAL: 387 MS
QTC CALCULATION(BAZETT): 403 MS
QTC FREDERICIA: 397 MS
R AXIS: 48 DEGREES
RBC # BLD AUTO: 4.62 X10*6/UL (ref 4–5.9)
SODIUM SERPL-SCNC: 136 MMOL/L (ref 136–145)
T AXIS: 51 DEGREES
T OFFSET: 443 MS
VENTRICULAR RATE: 65 BPM
WBC # BLD AUTO: 8.1 X10*3/UL (ref 4.4–11.3)

## 2025-01-27 PROCEDURE — 99285 EMERGENCY DEPT VISIT HI MDM: CPT | Mod: 25 | Performed by: EMERGENCY MEDICINE

## 2025-01-27 PROCEDURE — 80053 COMPREHEN METABOLIC PANEL: CPT | Performed by: NURSE PRACTITIONER

## 2025-01-27 PROCEDURE — 82947 ASSAY GLUCOSE BLOOD QUANT: CPT

## 2025-01-27 PROCEDURE — 70450 CT HEAD/BRAIN W/O DYE: CPT | Performed by: RADIOLOGY

## 2025-01-27 PROCEDURE — 85025 COMPLETE CBC W/AUTO DIFF WBC: CPT | Performed by: NURSE PRACTITIONER

## 2025-01-27 PROCEDURE — 97110 THERAPEUTIC EXERCISES: CPT | Mod: GP

## 2025-01-27 PROCEDURE — 36415 COLL VENOUS BLD VENIPUNCTURE: CPT | Performed by: NURSE PRACTITIONER

## 2025-01-27 PROCEDURE — 70450 CT HEAD/BRAIN W/O DYE: CPT

## 2025-01-27 PROCEDURE — 84484 ASSAY OF TROPONIN QUANT: CPT | Performed by: NURSE PRACTITIONER

## 2025-01-27 PROCEDURE — 96360 HYDRATION IV INFUSION INIT: CPT

## 2025-01-27 PROCEDURE — 2500000004 HC RX 250 GENERAL PHARMACY W/ HCPCS (ALT 636 FOR OP/ED): Performed by: NURSE PRACTITIONER

## 2025-01-27 PROCEDURE — 93005 ELECTROCARDIOGRAM TRACING: CPT

## 2025-01-27 RX ADMIN — SODIUM CHLORIDE 500 ML: 9 INJECTION, SOLUTION INTRAVENOUS at 13:22

## 2025-01-27 ASSESSMENT — COLUMBIA-SUICIDE SEVERITY RATING SCALE - C-SSRS
6. HAVE YOU EVER DONE ANYTHING, STARTED TO DO ANYTHING, OR PREPARED TO DO ANYTHING TO END YOUR LIFE?: NO
1. IN THE PAST MONTH, HAVE YOU WISHED YOU WERE DEAD OR WISHED YOU COULD GO TO SLEEP AND NOT WAKE UP?: NO
2. HAVE YOU ACTUALLY HAD ANY THOUGHTS OF KILLING YOURSELF?: NO

## 2025-01-27 ASSESSMENT — PAIN DESCRIPTION - PAIN TYPE: TYPE: ACUTE PAIN

## 2025-01-27 ASSESSMENT — LIFESTYLE VARIABLES
EVER HAD A DRINK FIRST THING IN THE MORNING TO STEADY YOUR NERVES TO GET RID OF A HANGOVER: NO
EVER FELT BAD OR GUILTY ABOUT YOUR DRINKING: NO
HAVE YOU EVER FELT YOU SHOULD CUT DOWN ON YOUR DRINKING: NO
TOTAL SCORE: 0
HAVE PEOPLE ANNOYED YOU BY CRITICIZING YOUR DRINKING: NO

## 2025-01-27 ASSESSMENT — PAIN SCALES - GENERAL: PAINLEVEL_OUTOF10: 0 - NO PAIN

## 2025-01-27 ASSESSMENT — PAIN - FUNCTIONAL ASSESSMENT: PAIN_FUNCTIONAL_ASSESSMENT: 0-10

## 2025-01-27 NOTE — PROGRESS NOTES
PHYSICAL THERAPY   TREATMENT NOTE    Patient Name:  Jennifer Terrell   Patient MRN: [id number]  Date: 01/28/25    Time Calculation  Start Time: 0903  Stop Time: 0948  Time Calculation (min): 45 min    Insurance:  Visit number: 2  Insurance Type: [employer]   Approved # of visits: 24  Authorization Needed: yes  Cert Date Ends On: 4-25-2[place] [address] ([id number], [id number], [id number], [id number] APPROVED)     General:  Reason for visit: gender dysphoria; preop strengthening   Referred by: Nestor Orellana diagnoses:   1. Pelvic floor tension  Follow Up In Physical Therapy           Assessment:    Pain levels were slightly reduced at the end of the treatment.    Plan:  1) Will see again in June for pre surgery recheck of POC     Subjective:  L hip has been more achy, especially with sleeping on it. Low back has not been too much of a problem  Pain (0-10): 0/10 Lumbar spine     Precautions:  PMH: gender dysphoria, L deafness, obesity, dysphagia, GERD, hiatal hernia, hemorrhoids, bipolad depression, chronic low back pain, epilepsy, facial myokymia, TIA  Fall Risk: yes   Fall Risk: yes    Objective:    Treatment Performed:   Therapeutic Exercise:  45 minutes  Review HEP   Bridge 4 x 5   Hip flexor stretch on chair 30 seconds x 3 R/L  Seated hip abduction sit to stand   Hooklying abduction isometric   LTR x 2 minutes x 2   TA contraction hooklying   Therapeutic Activity:   minutes     Self Care:   minutes    Manual Therapy:   minutes    Neuromuscular Re-education:   minutes    Gait Training:    minutes    Modalities:    minutes    Dry Needling:   minutes

## 2025-01-27 NOTE — ED PROVIDER NOTES
HPI   Chief Complaint   Patient presents with    slurred speech    weakness       This is a 34-year-old, male to female transition, with a past medical history of bipolar disorder, chronic low back pain, facial myokymia, GERD, and seizures that is currently on Keppra, that is presenting to the emergency room with strokelike symptoms.  She was in the process of getting a couple coffee when the symptoms started around 11 AM this morning.  Reports feeling very fatigued and weak on the right side.  The patient reports that she has had similar symptoms in the past.  She denies any chest pain, shortness of breath, palpitations, paresthesias, focal weakness.  Denies any abdominal pain alteration in her urine or bowel function.  Denies any fever or cold-like symptoms.  Patient reports that she has been taking her medications as directed.  Denies any alcohol or street drug use.  Denies any head injury or fall.      History provided by:  Patient   used: No            Patient History   Past Medical History:   Diagnosis Date    ADHD (attention deficit hyperactivity disorder)     Anxiety     Chondromalacia patellae, right knee 10/24/2019    Chondromalacia of right patella    Chronic low back pain 04/13/2023    Depression     Dorsalgia, unspecified 06/24/2020    Acute back pain    Flushing 01/06/2021    Flushing    Generalized abdominal pain 11/02/2021    Abdominal pain, generalized    Local infection of the skin and subcutaneous tissue, unspecified 10/06/2020    Skin infection, bacterial    Nausea 03/10/2021    Nausea in adult    Other forms of dyspnea 03/08/2018    Dyspnea on exertion    Other hemorrhoids 07/15/2021    Internal hemorrhoids    Other skin changes 07/08/2021    Skin irritation    Otitis media, unspecified, left ear 05/01/2020    Acute otitis media, left    Personal history of COVID-19     Personal history of COVID-19    Personal history of diseases of the skin and subcutaneous tissue 03/10/2021     History of dermatitis    Personal history of other diseases of the respiratory system 10/06/2021    History of sore throat    Personal history of other diseases of the respiratory system 10/06/2021    History of laryngitis    Personal history of other specified conditions 08/17/2021    History of dysphagia    Personal history of other specified conditions 05/03/2018    History of palpitations    Personal history of other specified conditions 05/03/2018    History of exertional chest pain    Personal history of other specified conditions 05/03/2018    History of syncope    Seasonal asthma ([facility])      Past Surgical History:   Procedure Laterality Date    INNER EAR SURGERY  03/08/2018    Inner Ear Surgery    OTHER SURGICAL HISTORY  04/05/2022    Oral surgery    OTHER SURGICAL HISTORY  08/06/2021    Esophagogastroduodenoscopy    OTHER SURGICAL HISTORY  08/06/2021    Colonoscopy    TONSILLECTOMY  03/08/2018    Tonsillectomy     Family History   Problem Relation Name Age of Onset    Other (cva) Other      Epilepsy Other      Ulcerative colitis Other      Hypertension Other       Social History     Tobacco Use    Smoking status: Never     Passive exposure: Never    Smokeless tobacco: Never   Vaping Use    Vaping status: Never Used   Substance Use Topics    Alcohol use: Not Currently     Comment: very rare    Drug use: Yes     Types: Marijuana     Comment: ocassional edibles       Physical Exam   ED Triage Vitals [01/27/25 1208]   Temperature Heart Rate Respirations BP   36.3 °C (97.3 °F) 77 16 145/77      Pulse Ox Temp src Heart Rate Source Patient Position   99 % -- -- --      BP Location FiO2 (%)     -- --       Physical Exam  Vitals and nursing note reviewed.   Constitutional:       Appearance: Normal appearance. She is normal weight.   HENT:      Head: Normocephalic and atraumatic.      Right Ear: Tympanic membrane and external ear normal.      Left Ear: Tympanic membrane and external ear normal.      Nose: Nose  normal.      Mouth/Throat:      Mouth: Mucous membranes are moist.      Pharynx: Oropharynx is clear.   Eyes:      General: No visual field deficit.     Extraocular Movements: Extraocular movements intact.      Conjunctiva/sclera: Conjunctivae normal.      Pupils: Pupils are equal, round, and reactive to light.   Cardiovascular:      Rate and Rhythm: Normal rate and regular rhythm.      Pulses: Normal pulses.   Pulmonary:      Effort: Pulmonary effort is normal.      Breath sounds: Normal breath sounds.   Abdominal:      General: Abdomen is flat. Bowel sounds are normal.      Palpations: Abdomen is soft.   Genitourinary:     Comments: No CVA tenderness or pubic pain.  Musculoskeletal:         General: Normal range of motion.      Cervical back: Normal range of motion. No tenderness.      Comments: No midline spinal tenderness.  Patient is able to move against gravity.  Hand grasps are strong with slight weakness noted on the right upper extremity.  Distal extremity brisk cap refill and sensation intact.   Skin:     General: Skin is warm and dry.      Capillary Refill: Capillary refill takes less than 2 seconds.   Neurological:      General: No focal deficit present.      Mental Status: She is alert and oriented to person, place, and time.      GCS: GCS eye subscore is 4. GCS verbal subscore is 5. GCS motor subscore is 6.      Cranial Nerves: No dysarthria or facial asymmetry.      Sensory: No sensory deficit.      Motor: Weakness present.      Coordination: Coordination is intact.      Deep Tendon Reflexes: Reflexes are normal and symmetric.   Psychiatric:         Mood and Affect: Mood normal.         Judgment: Judgment normal.           ED Course & MDM   ED Course as of 01/27/25 1557   Mon Jan 27, 2025   1329 ECG 12 lead  Twelve-lead EKG interpreted by me.  Sinus rhythm at a rate of 65.  NE interval is 181, QRS is 100, QT is 387, QTc 403.  Normal axis.  Normal interval.  No acute ischemia or injury pattern noted.  [CT]      ED Course User Index  [CT] CHESTER GonsalesCNP         Diagnoses as of 01/27/25 1557   Generalized weakness                 No data recorded     [place] Coma Scale Score: 15 (01/27/25 1212 : Yumi Jimenez RN)                           Medical Decision Making  The patient was seen and evaluated with the attending physician, Dr. Gaspar.  The patient is presenting to the emergency room with complaints of slurred speech and weakness.  On examination the patient had a NIH of 0.  The patient did not have any facial droop or true asymmetry.  Patient was able to use her extremities without any gross deficits.  Differential diagnosis includes TIA, CVA, viral illness, electrolyte imbalance, ACS, arrhythmia, anemia, or other acute process.  A twelve-lead EKG was obtained and was negative for acute ischemia or injury pattern.  No arrhythmia appreciated.  A saline lock was established.  Laboratory studies were drawn with results as noted.  The patient does not have an acute leukocytosis.  No gross electrolyte imbalance.  Initial and delta troponins were negative.  A CT of the head was obtained that showed no acute intracranial process.  The patient was notified of her imaging and laboratory results.  Have a very low suspicion for any acute intercranial process at this time.  The patient was advised to continue monitoring and follow-up with her primary care physician in the next 2 to 3 days.  She was given strict return precautions.  The patient was discharged in stable condition with computer discharge instructions given.        Procedure  Procedures     SAM Gonsales  01/27/25 4664

## 2025-01-27 NOTE — ED TRIAGE NOTES
Pt presents for stroke like symptoms. She has numbness on the right side of her face and body, slurred speech and tongue numbness. States that she has weakness on the right side. LKW 1109. Was at home standing in the kitchen when symptoms happened. Feels dizzy like the room is spinning and like she is going to pass out. States fatigue and difficulty reading.

## 2025-02-23 LAB
ATRIAL RATE: 63 BPM
P AXIS: 37 DEGREES
PR INTERVAL: 181 MS
Q ONSET: 249 MS
QRS COUNT: 11 BEATS
QRS DURATION: 100 MS
QT INTERVAL: 387 MS
QTC CALCULATION(BAZETT): 403 MS
QTC FREDERICIA: 397 MS
R AXIS: 48 DEGREES
T AXIS: 51 DEGREES
T OFFSET: 443 MS
VENTRICULAR RATE: 65 BPM

## 2025-02-24 VITALS
WEIGHT: 260 LBS | SYSTOLIC BLOOD PRESSURE: 120 MMHG | DIASTOLIC BLOOD PRESSURE: 82 MMHG | HEIGHT: 72 IN | TEMPERATURE: 97.3 F | OXYGEN SATURATION: 99 % | HEART RATE: 63 BPM | RESPIRATION RATE: 20 BRPM | BODY MASS INDEX: 35.21 KG/M2

## 2025-02-24 PROCEDURE — 99281 EMR DPT VST MAYX REQ PHY/QHP: CPT

## 2025-02-24 PROCEDURE — 4500999001 HC ED NO CHARGE

## 2025-02-24 ASSESSMENT — PAIN DESCRIPTION - DESCRIPTORS: DESCRIPTORS: ACHING;DULL;CRAMPING

## 2025-02-24 ASSESSMENT — PAIN - FUNCTIONAL ASSESSMENT: PAIN_FUNCTIONAL_ASSESSMENT: 0-10

## 2025-02-24 ASSESSMENT — PAIN DESCRIPTION - ORIENTATION: ORIENTATION: MID

## 2025-02-24 ASSESSMENT — PAIN SCALES - GENERAL: PAINLEVEL_OUTOF10: 4

## 2025-02-24 ASSESSMENT — PAIN DESCRIPTION - LOCATION: LOCATION: ABDOMEN

## 2025-02-24 ASSESSMENT — PAIN DESCRIPTION - FREQUENCY: FREQUENCY: CONSTANT/CONTINUOUS

## 2025-02-24 ASSESSMENT — PAIN DESCRIPTION - PAIN TYPE: TYPE: ACUTE PAIN

## 2025-02-25 ENCOUNTER — HOSPITAL ENCOUNTER (EMERGENCY)
Facility: HOSPITAL | Age: 35
Discharge: HOME | End: 2025-02-26
Attending: EMERGENCY MEDICINE
Payer: COMMERCIAL

## 2025-02-25 ENCOUNTER — TELEPHONE (OUTPATIENT)
Dept: PRIMARY CARE | Facility: CLINIC | Age: 35
End: 2025-02-25

## 2025-02-25 ENCOUNTER — HOSPITAL ENCOUNTER (EMERGENCY)
Facility: HOSPITAL | Age: 35
Discharge: LEFT BEFORE DISCHARGE | End: 2025-02-25
Payer: COMMERCIAL

## 2025-02-25 ENCOUNTER — PATIENT MESSAGE (OUTPATIENT)
Dept: PRIMARY CARE | Facility: CLINIC | Age: 35
End: 2025-02-25
Payer: COMMERCIAL

## 2025-02-25 ENCOUNTER — APPOINTMENT (OUTPATIENT)
Dept: RADIOLOGY | Facility: HOSPITAL | Age: 35
End: 2025-02-25
Payer: COMMERCIAL

## 2025-02-25 DIAGNOSIS — K92.0 HEMATEMESIS WITH NAUSEA: Primary | ICD-10-CM

## 2025-02-25 DIAGNOSIS — K44.9 HIATAL HERNIA: ICD-10-CM

## 2025-02-25 DIAGNOSIS — K29.01 ACUTE GASTRITIS WITH HEMORRHAGE, UNSPECIFIED GASTRITIS TYPE: ICD-10-CM

## 2025-02-25 LAB
ALBUMIN SERPL BCP-MCNC: 4.4 G/DL (ref 3.4–5)
ALP SERPL-CCNC: 62 U/L (ref 33–120)
ALT SERPL W P-5'-P-CCNC: 19 U/L (ref 7–52)
ANION GAP SERPL CALC-SCNC: 10 MMOL/L (ref 10–20)
APPEARANCE UR: ABNORMAL
AST SERPL W P-5'-P-CCNC: 16 U/L (ref 9–39)
BASOPHILS # BLD AUTO: 0.03 X10*3/UL (ref 0–0.1)
BASOPHILS NFR BLD AUTO: 0.4 %
BILIRUB SERPL-MCNC: 0.5 MG/DL (ref 0–1.2)
BILIRUB UR STRIP.AUTO-MCNC: NEGATIVE MG/DL
BUN SERPL-MCNC: 13 MG/DL (ref 6–23)
CALCIUM SERPL-MCNC: 9.4 MG/DL (ref 8.6–10.3)
CHLORIDE SERPL-SCNC: 105 MMOL/L (ref 98–107)
CO2 SERPL-SCNC: 24 MMOL/L (ref 21–32)
COLOR UR: ABNORMAL
CREAT SERPL-MCNC: 1.21 MG/DL (ref 0.5–1.3)
EGFRCR SERPLBLD CKD-EPI 2021: 60 ML/MIN/1.73M*2
EOSINOPHIL # BLD AUTO: 0.11 X10*3/UL (ref 0–0.7)
EOSINOPHIL NFR BLD AUTO: 1.6 %
ERYTHROCYTE [DISTWIDTH] IN BLOOD BY AUTOMATED COUNT: 12.8 % (ref 11.5–14.5)
GLUCOSE SERPL-MCNC: 79 MG/DL (ref 74–99)
GLUCOSE UR STRIP.AUTO-MCNC: NORMAL MG/DL
HCT VFR BLD AUTO: 42.6 % (ref 36–52)
HGB BLD-MCNC: 14.7 G/DL (ref 12–17.5)
IMM GRANULOCYTES # BLD AUTO: 0.03 X10*3/UL (ref 0–0.7)
IMM GRANULOCYTES NFR BLD AUTO: 0.4 % (ref 0–0.9)
INR PPP: 1 (ref 0.9–1.1)
KETONES UR STRIP.AUTO-MCNC: NEGATIVE MG/DL
LEUKOCYTE ESTERASE UR QL STRIP.AUTO: NEGATIVE
LYMPHOCYTES # BLD AUTO: 1.41 X10*3/UL (ref 1.2–4.8)
LYMPHOCYTES NFR BLD AUTO: 20.4 %
MCH RBC QN AUTO: 29.8 PG (ref 26–34)
MCHC RBC AUTO-ENTMCNC: 34.5 G/DL (ref 32–36)
MCV RBC AUTO: 86 FL (ref 80–100)
MONOCYTES # BLD AUTO: 0.55 X10*3/UL (ref 0.1–1)
MONOCYTES NFR BLD AUTO: 8 %
MUCOUS THREADS #/AREA URNS AUTO: ABNORMAL /LPF
NEUTROPHILS # BLD AUTO: 4.77 X10*3/UL (ref 1.2–7.7)
NEUTROPHILS NFR BLD AUTO: 69.2 %
NITRITE UR QL STRIP.AUTO: NEGATIVE
NRBC BLD-RTO: 0 /100 WBCS (ref 0–0)
PH UR STRIP.AUTO: 5.5 [PH]
PLATELET # BLD AUTO: 196 X10*3/UL (ref 150–450)
POTASSIUM SERPL-SCNC: 3.5 MMOL/L (ref 3.5–5.3)
PROT SERPL-MCNC: 7.3 G/DL (ref 6.4–8.2)
PROT UR STRIP.AUTO-MCNC: NEGATIVE MG/DL
PROTHROMBIN TIME: 11.2 SECONDS (ref 9.8–12.4)
RBC # BLD AUTO: 4.93 X10*6/UL (ref 4–5.9)
RBC # UR STRIP.AUTO: ABNORMAL MG/DL
RBC #/AREA URNS AUTO: ABNORMAL /HPF
SODIUM SERPL-SCNC: 135 MMOL/L (ref 136–145)
SP GR UR STRIP.AUTO: 1.02
SQUAMOUS #/AREA URNS AUTO: ABNORMAL /HPF
UROBILINOGEN UR STRIP.AUTO-MCNC: NORMAL MG/DL
WBC # BLD AUTO: 6.9 X10*3/UL (ref 4.4–11.3)
WBC #/AREA URNS AUTO: ABNORMAL /HPF

## 2025-02-25 PROCEDURE — 36415 COLL VENOUS BLD VENIPUNCTURE: CPT | Performed by: EMERGENCY MEDICINE

## 2025-02-25 PROCEDURE — 80053 COMPREHEN METABOLIC PANEL: CPT | Performed by: EMERGENCY MEDICINE

## 2025-02-25 PROCEDURE — 85025 COMPLETE CBC W/AUTO DIFF WBC: CPT | Performed by: EMERGENCY MEDICINE

## 2025-02-25 PROCEDURE — 85610 PROTHROMBIN TIME: CPT | Performed by: EMERGENCY MEDICINE

## 2025-02-25 PROCEDURE — 81001 URINALYSIS AUTO W/SCOPE: CPT | Performed by: EMERGENCY MEDICINE

## 2025-02-25 PROCEDURE — 2550000001 HC RX 255 CONTRASTS: Performed by: EMERGENCY MEDICINE

## 2025-02-25 PROCEDURE — 99285 EMERGENCY DEPT VISIT HI MDM: CPT | Mod: 25 | Performed by: EMERGENCY MEDICINE

## 2025-02-25 PROCEDURE — 74174 CTA ABD&PLVS W/CONTRAST: CPT

## 2025-02-25 RX ORDER — PANTOPRAZOLE SODIUM 40 MG/10ML
40 INJECTION, POWDER, LYOPHILIZED, FOR SOLUTION INTRAVENOUS DAILY
Status: DISCONTINUED | OUTPATIENT
Start: 2025-02-26 | End: 2025-02-26 | Stop reason: HOSPADM

## 2025-02-25 RX ADMIN — IOHEXOL 100 ML: 350 INJECTION, SOLUTION INTRAVENOUS at 23:06

## 2025-02-25 ASSESSMENT — PAIN DESCRIPTION - LOCATION: LOCATION: ABDOMEN

## 2025-02-25 ASSESSMENT — LIFESTYLE VARIABLES
TOTAL SCORE: 0
HAVE PEOPLE ANNOYED YOU BY CRITICIZING YOUR DRINKING: NO
EVER FELT BAD OR GUILTY ABOUT YOUR DRINKING: NO
EVER HAD A DRINK FIRST THING IN THE MORNING TO STEADY YOUR NERVES TO GET RID OF A HANGOVER: NO
HAVE YOU EVER FELT YOU SHOULD CUT DOWN ON YOUR DRINKING: NO

## 2025-02-25 ASSESSMENT — PAIN SCALES - GENERAL: PAINLEVEL_OUTOF10: 4

## 2025-02-25 ASSESSMENT — PAIN DESCRIPTION - PAIN TYPE: TYPE: ACUTE PAIN

## 2025-02-25 ASSESSMENT — PAIN - FUNCTIONAL ASSESSMENT: PAIN_FUNCTIONAL_ASSESSMENT: 0-10

## 2025-02-25 NOTE — TELEPHONE ENCOUNTER
Notified patient. I offered her the soonest available appointment with TABITHA Feng 2/27 11am. I also told her if she feels the pain has increased or has any new symptoms that she should head to the ED. Patient expressed verbal understanding.

## 2025-02-25 NOTE — TELEPHONE ENCOUNTER
If her abdominal pain is severe then yes she should go back to the emergency room.  If it is not can you offer an acute appointment with any provider if available for evaluation?  Thanks,  Minda Veliz, DO

## 2025-02-25 NOTE — TELEPHONE ENCOUNTER
Patient called to let you know she's was at the ED last night. She is still having stomach pain. She stated they didn't do anything for her while she was there. They took her vitals and she left without being seen due to the wait.    She wants to know if she should go back to the ED?

## 2025-02-26 VITALS
SYSTOLIC BLOOD PRESSURE: 125 MMHG | OXYGEN SATURATION: 99 % | TEMPERATURE: 98.1 F | DIASTOLIC BLOOD PRESSURE: 82 MMHG | HEIGHT: 72 IN | BODY MASS INDEX: 35.21 KG/M2 | RESPIRATION RATE: 18 BRPM | WEIGHT: 260 LBS | HEART RATE: 68 BPM

## 2025-02-26 LAB — HOLD SPECIMEN: NORMAL

## 2025-02-26 RX ORDER — ONDANSETRON 4 MG/1
4 TABLET, ORALLY DISINTEGRATING ORAL EVERY 8 HOURS PRN
Qty: 20 TABLET | Refills: 0 | Status: SHIPPED | OUTPATIENT
Start: 2025-02-26 | End: 2025-03-05

## 2025-02-26 RX ORDER — OMEPRAZOLE 20 MG/1
20 CAPSULE, DELAYED RELEASE ORAL DAILY
Qty: 30 CAPSULE | Refills: 0 | Status: SHIPPED | OUTPATIENT
Start: 2025-02-26 | End: 2025-03-28

## 2025-02-26 RX ORDER — FAMOTIDINE 20 MG/1
20 TABLET, FILM COATED ORAL 2 TIMES DAILY
Qty: 30 TABLET | Refills: 0 | Status: SHIPPED | OUTPATIENT
Start: 2025-02-26 | End: 2025-03-13

## 2025-02-26 RX ORDER — ALUMINUM HYDROXIDE, MAGNESIUM HYDROXIDE, AND SIMETHICONE 1200; 120; 1200 MG/30ML; MG/30ML; MG/30ML
15 SUSPENSION ORAL EVERY 6 HOURS PRN
Qty: 355 ML | Refills: 0 | Status: SHIPPED | OUTPATIENT
Start: 2025-02-26 | End: 2025-03-08

## 2025-02-26 NOTE — DISCHARGE INSTRUCTIONS
Take medication as prescribed.  Follow-up with the GI doctor and primary care doctor soon as possible.  Come back to the ED for any new or worsening symptoms such as worsening abdominal pain, worsening vomiting blood

## 2025-02-26 NOTE — PROGRESS NOTES
Emergency Medicine Transition of Care Note.    I received Jennifer Terrell in signout from Dr. Bunch.  Please see the previous ED provider note for all HPI, PE and MDM up to the time of signout at 0100. This is in addition to the primary record.    In brief Jennifer Terrell is an 34 y.o. adult presenting for   Chief Complaint   Patient presents with    Vomiting Blood     At the time of signout we were awaiting: CTA of the abdomen pelvis    Diagnoses as of 02/26/25 0228   Hematemesis with nausea   Acute gastritis with hemorrhage, unspecified gastritis type   Hiatal hernia       Medical Decision Making    34-year-old patient presented to ED with concerns for epigastric pain and hematemesis.  Labs showed no acute findings.  At signout pending CTA of the abdomen.  CTA shows hiatal hernia and is also gastric wall thickening consistent with gastritis versus possible mass.  On reevaluation patient feels improved and tolerating p.o. intake.  Using the glasglow Blatchford score patient is overall low risk.  Since she is tolerating p.o. intake will discharge patient home with antiemetics and antiacid medications.  Will refer to GI.  Recommend coming back to the ED for any new or worsening symptoms    Final diagnoses:   [K92.0] Hematemesis with nausea   [K29.01] Acute gastritis with hemorrhage, unspecified gastritis type   [K44.9] Hiatal hernia           Procedure  Procedures    Ander Andrade DO

## 2025-02-26 NOTE — ED PROVIDER NOTES
HPI   Chief Complaint   Patient presents with    Vomiting Blood       Patient presents to the emergency department secondary to epigastric and mid abdominal pain.  Patient also has had nausea and vomiting.  She has had hematemesis.  She initially had dark blood with emesis and now is having emesis with food products with blood streaks.  She is never had this in the past before.  She has a history of acid reflux but has not been able to take her medication because she is unable to afford it.  She does have a history of ureteral stones also.  No fever.  She has some urinary frequency.  No back pain.  No diarrhea.                          Jayshree Coma Scale Score: 15                  Patient History   Past Medical History:   Diagnosis Date    ADHD (attention deficit hyperactivity disorder)     Anxiety     Chondromalacia patellae, right knee 10/24/2019    Chondromalacia of right patella    Chronic low back pain 04/13/2023    Depression     Dorsalgia, unspecified 06/24/2020    Acute back pain    Flushing 01/06/2021    Flushing    Generalized abdominal pain 11/02/2021    Abdominal pain, generalized    Local infection of the skin and subcutaneous tissue, unspecified 10/06/2020    Skin infection, bacterial    Nausea 03/10/2021    Nausea in adult    Other forms of dyspnea 03/08/2018    Dyspnea on exertion    Other hemorrhoids 07/15/2021    Internal hemorrhoids    Other skin changes 07/08/2021    Skin irritation    Otitis media, unspecified, left ear 05/01/2020    Acute otitis media, left    Personal history of COVID-19     Personal history of COVID-19    Personal history of diseases of the skin and subcutaneous tissue 03/10/2021    History of dermatitis    Personal history of other diseases of the respiratory system 10/06/2021    History of sore throat    Personal history of other diseases of the respiratory system 10/06/2021    History of laryngitis    Personal history of other specified conditions 08/17/2021    History of  dysphagia    Personal history of other specified conditions 05/03/2018    History of palpitations    Personal history of other specified conditions 05/03/2018    History of exertional chest pain    Personal history of other specified conditions 05/03/2018    History of syncope    Seasonal asthma ([facility])      Past Surgical History:   Procedure Laterality Date    INNER EAR SURGERY  03/08/2018    Inner Ear Surgery    OTHER SURGICAL HISTORY  04/05/2022    Oral surgery    OTHER SURGICAL HISTORY  08/06/2021    Esophagogastroduodenoscopy    OTHER SURGICAL HISTORY  08/06/2021    Colonoscopy    TONSILLECTOMY  03/08/2018    Tonsillectomy     Family History   Problem Relation Name Age of Onset    Other (cva) Other      Epilepsy Other      Ulcerative colitis Other      Hypertension Other       Social History     Tobacco Use    Smoking status: Never     Passive exposure: Never    Smokeless tobacco: Never   Vaping Use    Vaping status: Never Used   Substance Use Topics    Alcohol use: Not Currently     Comment: very rare    Drug use: Yes     Types: Marijuana     Comment: ocassional edibles       Physical Exam   ED Triage Vitals [02/25/25 1850]   Temperature Heart Rate Respirations BP   36.7 °C (98.1 °F) 86 16 137/84      Pulse Ox Temp Source Heart Rate Source Patient Position   100 % Temporal Monitor --      BP Location FiO2 (%)     -- --       Physical Exam  Vitals and nursing note reviewed.   Constitutional:       Appearance: Normal appearance.   HENT:      Head: Normocephalic.      Nose: Nose normal.      Mouth/Throat:      Mouth: Mucous membranes are moist.   Eyes:      Extraocular Movements: Extraocular movements intact.      Pupils: Pupils are equal, round, and reactive to light.   Cardiovascular:      Rate and Rhythm: Normal rate and regular rhythm.      Pulses: Normal pulses.      Heart sounds: Normal heart sounds.   Pulmonary:      Effort: Pulmonary effort is normal.      Breath sounds: Normal breath sounds.    Abdominal:      General: Abdomen is flat. Bowel sounds are normal.      Palpations: Abdomen is soft.      Tenderness: There is abdominal tenderness. There is no guarding or rebound.      Comments: Upper abdominal tenderness to palpation.  No peritoneal signs.   Musculoskeletal:         General: Normal range of motion.      Cervical back: Normal range of motion.   Skin:     General: Skin is warm and dry.      Capillary Refill: Capillary refill takes less than 2 seconds.   Neurological:      General: No focal deficit present.      Mental Status: She is alert and oriented to person, place, and time.   Psychiatric:         Mood and Affect: Mood normal.         Behavior: Behavior normal.       Labs Reviewed - No data to display  Pain Management Panel          Latest Ref Rng & Units 7/10/2024   Pain Management Panel   Amphetamine Screen, Urine Presumptive Negative Presumptive Negative    Barbiturate Screen, Urine Presumptive Negative Presumptive Negative    Benzodiazepines Screen, Urine Presumptive Negative Presumptive Negative    Fentanyl Screen, Urine Presumptive Negative Presumptive Negative    Methadone Screen, Urine Presumptive Negative Presumptive Negative      No orders to display     ED Course & MDM   Diagnoses as of 02/26/25 5312   Hematemesis with nausea   Acute gastritis with hemorrhage, unspecified gastritis type   Hiatal hernia       Medical Decision Making  Patient presents to the emergency department secondary to nausea vomiting and hematemesis.  Symptoms started this evening.  Patient is evaluated for acute intra-abdominal bleed, anemia, electrolyte abnormality or other acute cause.  Laboratory workup is overall unremarkable.  No evidence of acute anemia.  No acute electrolyte abnormalities.  Patient is pending CT scan at the time of signout.  Patient is signed out to the oncoming physician for reevaluation and disposition.        Procedure  Procedures     Mindi Bunch MD  02/26/25 6208

## 2025-02-27 ENCOUNTER — APPOINTMENT (OUTPATIENT)
Dept: PRIMARY CARE | Facility: CLINIC | Age: 35
End: 2025-02-27
Payer: COMMERCIAL

## 2025-03-06 ENCOUNTER — APPOINTMENT (OUTPATIENT)
Dept: PRIMARY CARE | Facility: CLINIC | Age: 35
End: 2025-03-06
Payer: COMMERCIAL

## 2025-03-12 NOTE — PATIENT INSTRUCTIONS
Thank you for choosing [facility] for your healthcare.   As always if you have any questions or concerns please do not hesitate to call our office at [phone] or through [employer].    Have a great day!  Minda Veliz, DO

## 2025-03-14 ENCOUNTER — APPOINTMENT (OUTPATIENT)
Dept: PRIMARY CARE | Facility: CLINIC | Age: 35
End: 2025-03-14
Payer: MEDICAID

## 2025-03-14 VITALS
HEIGHT: 72 IN | TEMPERATURE: 96.8 F | BODY MASS INDEX: 35.08 KG/M2 | RESPIRATION RATE: 22 BRPM | OXYGEN SATURATION: 96 % | WEIGHT: 259 LBS | HEART RATE: 80 BPM | DIASTOLIC BLOOD PRESSURE: 77 MMHG | SYSTOLIC BLOOD PRESSURE: 148 MMHG

## 2025-03-14 DIAGNOSIS — R05.3 PERSISTENT COUGH: Primary | ICD-10-CM

## 2025-03-14 DIAGNOSIS — F64.9 GENDER DYSPHORIA: ICD-10-CM

## 2025-03-14 DIAGNOSIS — Z11.3 SCREENING FOR STD (SEXUALLY TRANSMITTED DISEASE): ICD-10-CM

## 2025-03-14 DIAGNOSIS — Z23 NEED FOR TDAP VACCINATION: ICD-10-CM

## 2025-03-14 PROCEDURE — 90715 TDAP VACCINE 7 YRS/> IM: CPT | Performed by: FAMILY MEDICINE

## 2025-03-14 PROCEDURE — 1036F TOBACCO NON-USER: CPT | Performed by: FAMILY MEDICINE

## 2025-03-14 PROCEDURE — 99214 OFFICE O/P EST MOD 30 MIN: CPT | Performed by: FAMILY MEDICINE

## 2025-03-14 PROCEDURE — 3008F BODY MASS INDEX DOCD: CPT | Performed by: FAMILY MEDICINE

## 2025-03-14 PROCEDURE — 90471 IMMUNIZATION ADMIN: CPT | Performed by: FAMILY MEDICINE

## 2025-03-14 RX ORDER — PREDNISONE 20 MG/1
20 TABLET ORAL DAILY
Qty: 5 TABLET | Refills: 0 | Status: SHIPPED | OUTPATIENT
Start: 2025-03-14 | End: 2025-03-19

## 2025-03-14 RX ORDER — PROGESTERONE 200 MG/1
200 CAPSULE ORAL DAILY
Qty: 90 CAPSULE | Refills: 3 | Status: SHIPPED | OUTPATIENT
Start: 2025-03-14 | End: 2026-03-14

## 2025-03-14 SDOH — ECONOMIC STABILITY: FOOD INSECURITY: WITHIN THE PAST 12 MONTHS, THE FOOD YOU BOUGHT JUST DIDN'T LAST AND YOU DIDN'T HAVE MONEY TO GET MORE.: NEVER TRUE

## 2025-03-14 SDOH — ECONOMIC STABILITY: FOOD INSECURITY: WITHIN THE PAST 12 MONTHS, YOU WORRIED THAT YOUR FOOD WOULD RUN OUT BEFORE YOU GOT MONEY TO BUY MORE.: NEVER TRUE

## 2025-03-14 ASSESSMENT — LIFESTYLE VARIABLES
HOW OFTEN DO YOU HAVE A DRINK CONTAINING ALCOHOL: NEVER
SKIP TO QUESTIONS 9-10: 1
AUDIT-C TOTAL SCORE: 0
HOW MANY STANDARD DRINKS CONTAINING ALCOHOL DO YOU HAVE ON A TYPICAL DAY: PATIENT DOES NOT DRINK
HOW OFTEN DO YOU HAVE SIX OR MORE DRINKS ON ONE OCCASION: NEVER

## 2025-03-14 ASSESSMENT — PATIENT HEALTH QUESTIONNAIRE - PHQ9
10. IF YOU CHECKED OFF ANY PROBLEMS, HOW DIFFICULT HAVE THESE PROBLEMS MADE IT FOR YOU TO DO YOUR WORK, TAKE CARE OF THINGS AT HOME, OR GET ALONG WITH OTHER PEOPLE: SOMEWHAT DIFFICULT
SUM OF ALL RESPONSES TO PHQ9 QUESTIONS 1 & 2: 2
2. FEELING DOWN, DEPRESSED OR HOPELESS: SEVERAL DAYS
1. LITTLE INTEREST OR PLEASURE IN DOING THINGS: SEVERAL DAYS

## 2025-03-14 ASSESSMENT — PAIN SCALES - GENERAL: PAINLEVEL_OUTOF10: 0-NO PAIN

## 2025-03-14 NOTE — ASSESSMENT & PLAN NOTE
Continue estradiol and progesterone  Planning on bottom surgery later this year  Orders:    Estradiol; Future    Testosterone, total and free; Future    progesterone (Prometrium) 200 mg capsule; Take 1 capsule (200 mg) by mouth once daily.

## 2025-03-14 NOTE — PROGRESS NOTES
Subjective   Patient ID: Jennifer Terrell is a 34 y.o. adult who presents for Follow-up (HRT and back pain ).  She went to the ER in February for vomiting and hematemesis. She has not had any more episodes of this.     She was sick with URI about 2 weeks ago. All symptoms have resolved except for lingering dry cough.    She is planning on bottom surgery in August.         Patient Care Team:  Minda Veliz DO as PCP - General  Nadia Orantes as Therapist (Psychology)  DAMION Macdonald-CNP as Nurse Practitioner (Gastroenterology)  Dipti Rosa MD MPH as Consulting Physician (Urology)  DAMION Starks-CNS as Nurse Practitioner (Psychiatry)    Current Outpatient Medications   Medication Sig Dispense Refill    alum-mag hydroxide-simeth (Mylanta) 200-200-20 mg/5 mL oral suspension Take 15 mL by mouth every 6 hours if needed for indigestion or heartburn for up to 10 days. 355 mL 0    ARIPiprazole (Abilify) 20 mg tablet Take 1 tablet (20 mg) by mouth once daily.      bicalutamide (Casodex) 50 mg tablet Take 1 tablet by mouth once daily 90 tablet 1    cetirizine (ZyrTEC) 10 mg tablet Take 1 tablet (10 mg) by mouth once daily. Allergy medication 90 tablet 3    estradioL 1.25 mg/1.25 gram (0.1 %) gel in packet Place 3.75 mg on the skin once daily. (3 packets) 112.5 g 11    hydrocortisone (Anusol-HC) 2.5 % rectal cream Insert into the rectum 2 times a day for 14 days. 30 g 0    hydrOXYzine pamoate (Vistaril) 25 mg capsule Take 1 capsule (25 mg) by mouth every 12 hours.      lamoTRIgine (LaMICtal) 150 mg tablet Take 1 tablet (150 mg) by mouth 2 times a day.      levETIRAcetam (Keppra) 750 mg tablet Take 2 tablets (1,500 mg) by mouth every 12 hours.      prazosin (Minipress) 1 mg capsule Take 1 capsule (1 mg) by mouth once daily at bedtime. For nightmares      tiZANidine (Zanaflex) 2 mg tablet Take 1 tablet (2 mg) by mouth 2 times a day as needed for muscle spasms. 180 tablet 1    traZODone (Desyrel) 100 mg tablet  Take 1 tablet (100 mg) by mouth once daily at bedtime.      zonisamide (Zonegran) 100 mg capsule Take 2 capsules (200 mg) by mouth once daily at bedtime.      predniSONE (Deltasone) 20 mg tablet Take 1 tablet (20 mg) by mouth once daily for 5 days. Take with food 5 tablet 0    progesterone (Prometrium) 200 mg capsule Take 1 capsule (200 mg) by mouth once daily. 90 capsule 3     No current facility-administered medications for this visit.       Objective     Visit Vitals  /77 (BP Location: Left arm, Patient Position: Sitting, BP Cuff Size: Adult long)   Pulse 80   Temp 36 °C (96.8 °F) (Temporal)   Resp 22   Ht 1.829 m (6')   Wt 117 kg (259 lb)   SpO2 96%   BMI 35.13 kg/m²   Smoking Status Never   BSA 2.44 m²        Constitutional: Well nourished, well developed, appears stated age  Eyes: no scleral icterus, no conjunctival injection  Cardiovascular: regular rate and rhythm, no leg edema  Respiratory: normal respiratory effort, clear to auscultation bilaterally  Musculoskeletal: No gross deformities appreciated  Skin: Warm, dry. No rashes  Neurologic: Alert, CNs II-XII grossly intact..  Psych: Appropriate mood and affect.        Assessment & Plan  Persistent cough    Orders:    predniSONE (Deltasone) 20 mg tablet; Take 1 tablet (20 mg) by mouth once daily for 5 days. Take with food    Need for Tdap vaccination    Orders:    Tdap vaccine, age 7 years and older  (BOOSTRIX)    Screening for STD (sexually transmitted disease)    Orders:    C. trachomatis / N. gonorrhoeae, Amplified, Urogenital; Future    Hepatitis C Antibody; Future    HIV 1/2 Antigen/Antibody Screen with Reflex to Confirmation; Future    Syphilis Screen with Reflex; Future    Trichomonas vaginalis, Amplified; Future    Gender dysphoria  Continue estradiol and progesterone  Planning on bottom surgery later this year  Orders:    Estradiol; Future    Testosterone, total and free; Future    progesterone (Prometrium) 200 mg capsule; Take 1 capsule (200  mg) by mouth once daily.         Follow up 3-4 months.     Minda Veliz, DO

## 2025-03-16 LAB
C TRACH RRNA SPEC QL NAA+PROBE: NOT DETECTED
ESTRADIOL SERPL-MCNC: 243 PG/ML
HCV AB SERPL QL IA: NORMAL
HIV 1+2 AB+HIV1 P24 AG SERPL QL IA: NORMAL
N GONORRHOEA RRNA SPEC QL NAA+PROBE: NOT DETECTED
QUEST GC CT AMPLIFIED (ALWAYS MESSAGE): NORMAL
T PALLIDUM AB SER QL IA: NORMAL
T VAGINALIS RRNA SPEC QL NAA+PROBE: NOT DETECTED
TESTOST FREE SERPL-MCNC: NORMAL PG/ML
TESTOST SERPL-MCNC: NORMAL NG/DL

## 2025-03-23 LAB
C TRACH RRNA SPEC QL NAA+PROBE: NOT DETECTED
ESTRADIOL SERPL-MCNC: 243 PG/ML
HCV AB SERPL QL IA: NORMAL
HIV 1+2 AB+HIV1 P24 AG SERPL QL IA: NORMAL
N GONORRHOEA RRNA SPEC QL NAA+PROBE: NOT DETECTED
QUEST GC CT AMPLIFIED (ALWAYS MESSAGE): NORMAL
T PALLIDUM AB SER QL IA: NEGATIVE
T VAGINALIS RRNA SPEC QL NAA+PROBE: NOT DETECTED
TESTOST FREE SERPL-MCNC: 15.9 PG/ML (ref 35–155)
TESTOST SERPL-MCNC: 90 NG/DL (ref 250–1100)

## 2025-04-01 ENCOUNTER — APPOINTMENT (OUTPATIENT)
Facility: CLINIC | Age: 35
End: 2025-04-01
Payer: COMMERCIAL

## 2025-04-29 NOTE — H&P (VIEW-ONLY)
"Subjective   Patient ID: Jennifer Terrell is a 34 y.o. adult (transgender female male-to-female) who was referred by ER providers for Diarrhea.    Patient's PCP is Dr. Minda Veliz    PMH: bipolar disorder, chronic pain, seizures    HPI  Patient is established to our office as she was seen numerous times by Alison King with last visit on 6/1/2024. Patient has a history of GERD and was seen by Dr. Bush in [facility] on 8/10/2024. A pH impedance test and esophageal manometry were ordered. The manometry study was normal. I do not see the pH impedance results.    Patient was then in the ER on 2/25/2025 for hematemesis with nausea. ER provider note that patient couldn't afford to take her reflux medication so she was off of it. CMP unrevealing, CBC normal with hemoglobin 14.7. CTA A/P showed a moderate sized hiatal hernia with moderate to severe wall thickening. No signs of active contrast extravasation. There was also hepatic steatosis and mild diffuse bladder wall thickening. Per ER note, patient was discharged home with antiemetics and antacids.     Patient states that the vomiting of blood when she went to the ER for black/purple vomiting. Patient states that she has continued to have some gagging and sense of regurgitation, which is chronic. Patient is still on no PPI. She states that her dysphagia to solids has continued. She states that when she eats dense solids, like bread, they seem to go down slowly and she has to wash it down with liquids. When asked about black stools, she states that she \"probably\" has, but cannot provide more information than that. She states that with excessive wiping she will see a small amount of red blood on the toilet paper. Patient has been taking prescription NSAID for frequent headaches. Patient has 2 days of diarrhea, which is not usual.    Prior GI evaluation:  - EGD 7/2021: 2cm hiatal hernia, normal esophagus, stomach, and duodenum. Esophageal biopsies negative " for EoE and dysplasia   - Colonoscopy 7/2021: erythematous rectum (biopsies showed contusion) and internal hemorroids   - EGD 1/2023: 2cm hiatal hernia, otherwise normal esopahgus, gastritis, and normal duodenum. Pathology showed mild chronic gastritis.   -Esophageal manometry 10/16/2023: normal    Review of Systems:  Constitutional: Weight is stable in chart  Skin: No reported icterus, lesions, or rash.  Eye: No reported itching, pain, vision changes.  Ear: No reported discharge, hearing loss, or pain.  Nose: No reported congestion, discharge, or epistaxis.  Mouth/throat: No reported dysphagia, hoarseness, or throat pain.  Resp: No reported cough, dyspnea, or wheezing.  Cardiovascular: No reported chest pain, lower extremity edema, or palpitations.   GI: History of hematemesis.  : No reported dysuria, hematuria, or frequency.  Neuro: No reported confusion, memory loss, headaches, or dizziness.  Psych: No reported anxiety, depression, or insomnia.  Musculoskeletal: No reported arthralgia, joint swelling, or myalgias.  Heme/lymph: No reported easy bleeding or bruising, or swollen lymph nodes.  Endocrine: No reported cold/heat intolerance, polydipsia, or polyuria.     Medications:  Prior to Admission medications    Medication Sig Start Date End Date Taking? Authorizing Provider   ARIPiprazole (Abilify) 20 mg tablet Take 1 tablet (20 mg) by mouth once daily. 2/2/24   Historical Provider, MD   bicalutamide (Casodex) 50 mg tablet Take 1 tablet by mouth once daily 1/24/25   Virginia M Factor, DO   cetirizine (ZyrTEC) 10 mg tablet Take 1 tablet (10 mg) by mouth once daily. Allergy medication 6/10/24 6/10/25  Virginia M Factor, DO   estradioL 1.25 mg/1.25 gram (0.1 %) gel in packet Place 3.75 mg on the skin once daily. (3 packets) 8/26/24 8/26/25  Virginia M Factor, DO   hydrocortisone (Anusol-HC) 2.5 % rectal cream Insert into the rectum 2 times a day for 14 days. 8/26/24 3/14/25  Virginia M Factor, DO   hydrOXYzine  pamoate (Vistaril) 25 mg capsule Take 1 capsule (25 mg) by mouth every 12 hours. 11/6/24   Historical Provider, MD   lamoTRIgine (LaMICtal) 150 mg tablet Take 1 tablet (150 mg) by mouth 2 times a day. 4/10/24   Historical Provider, MD   levETIRAcetam (Keppra) 750 mg tablet Take 2 tablets (1,500 mg) by mouth every 12 hours. 3/19/24   Historical Provider, MD   prazosin (Minipress) 1 mg capsule Take 1 capsule (1 mg) by mouth once daily at bedtime. For nightmares 4/22/24   Historical Provider, MD   progesterone (Prometrium) 200 mg capsule Take 1 capsule (200 mg) by mouth once daily. 3/14/25 3/14/26  [facility] Factor, DO   tiZANidine (Zanaflex) 2 mg tablet Take 1 tablet (2 mg) by mouth 2 times a day as needed for muscle spasms. 11/18/24 5/17/25  Virginia M Factor, DO   traZODone (Desyrel) 100 mg tablet Take 1 tablet (100 mg) by mouth once daily at bedtime. 1/10/18   Historical Provider, MD   zonisamide (Zonegran) 100 mg capsule Take 2 capsules (200 mg) by mouth once daily at bedtime. 11/2/18   Historical Provider, MD       Allergies:  Cinnamon; Penicillin v potassium; Penicillins; Petrolatum,white; and Petroleum jelly [white petrolatum]    Objective   Physical exam:  Constitutional: Well developed, well nourished 34 y.o. year old in no acute distress.   Skin: Warm and dry. Normal turgor. No rash, ulcer, trauma, jaundice.   Eyes: Pupils symmetric and reactive to light.  Respiratory: Clear to auscultation bilaterally. No wheezes, rhonchi, or rales heard.  Cardiovascular: Regular rate and rhythm. S1 and S2 appreciated and normal. No murmur, rub, or gallop heard.   Edema: No edema noted.  GI: Normal bowel sounds. Soft, non-distended, mild TTP in LUQ. No rebound or guarding present. No hepatomegaly or splenomegaly appreciated. Abdominal aorta not palpably abnormal.  Musculoskeletal: Limbs and Joints grossly normal. Full range of motion in major joint.   Neuro: Alert and oriented x 3. Cranial nerves 2-12 grossly intact.    Psych: Normal mood and affect.        Relevant Results Recent labs reviewed in the EMR.    Radiology: Reviewed imaging reviewed in the EMR.  Imaging  No results found.    Cardiology, Vascular, and Other Imaging  No other imaging results found for the past 7 days      Assessment/Plan   Problem List Items Addressed This Visit           ICD-10-CM    Hematemesis with nausea K92.0    No reoccurrence of hematemesis. CT scan in ER in February showed moderate to severe gastritis, which was likely the cause of hematemesis. This may be due to NSAIDs and being off PPI. Will restart PPI. EGD ordered for further evaluation. Hemoglobin normal.          Relevant Medications    omeprazole (PriLOSEC) 40 mg DR capsule    Other Relevant Orders    Esophagogastroduodenoscopy (EGD)     Other Visit Diagnoses         Codes      Acute gastritis with hemorrhage, unspecified gastritis type     K29.01    Relevant Medications    omeprazole (PriLOSEC) 40 mg DR capsule    Other Relevant Orders    Esophagogastroduodenoscopy (EGD)          Patient should follow up with Alison King, who she is well established with for chronic GI needs.  Shoshana Horne PA-C

## 2025-04-29 NOTE — PROGRESS NOTES
"Subjective   Patient ID: Jennifer Terrell is a 34 y.o. adult (transgender female male-to-female) who was referred by ER providers for Diarrhea.    Patient's PCP is Dr. Minda Veliz    PMH: bipolar disorder, chronic pain, seizures    HPI  Patient is established to our office as she was seen numerous times by Alison King with last visit on 6/1/2024. Patient has a history of GERD and was seen by Dr. Bush in [facility] on 8/10/2024. A pH impedance test and esophageal manometry were ordered. The manometry study was normal. I do not see the pH impedance results.    Patient was then in the ER on 2/25/2025 for hematemesis with nausea. ER provider note that patient couldn't afford to take her reflux medication so she was off of it. CMP unrevealing, CBC normal with hemoglobin 14.7. CTA A/P showed a moderate sized hiatal hernia with moderate to severe wall thickening. No signs of active contrast extravasation. There was also hepatic steatosis and mild diffuse bladder wall thickening. Per ER note, patient was discharged home with antiemetics and antacids.     Patient states that the vomiting of blood when she went to the ER for black/purple vomiting. Patient states that she has continued to have some gagging and sense of regurgitation, which is chronic. Patient is still on no PPI. She states that her dysphagia to solids has continued. She states that when she eats dense solids, like bread, they seem to go down slowly and she has to wash it down with liquids. When asked about black stools, she states that she \"probably\" has, but cannot provide more information than that. She states that with excessive wiping she will see a small amount of red blood on the toilet paper. Patient has been taking prescription NSAID for frequent headaches. Patient has 2 days of diarrhea, which is not usual.    Prior GI evaluation:  - EGD 7/2021: 2cm hiatal hernia, normal esophagus, stomach, and duodenum. Esophageal biopsies negative " for EoE and dysplasia   - Colonoscopy 7/2021: erythematous rectum (biopsies showed contusion) and internal hemorroids   - EGD 1/2023: 2cm hiatal hernia, otherwise normal esopahgus, gastritis, and normal duodenum. Pathology showed mild chronic gastritis.   -Esophageal manometry 10/16/2023: normal    Review of Systems:  Constitutional: Weight is stable in chart  Skin: No reported icterus, lesions, or rash.  Eye: No reported itching, pain, vision changes.  Ear: No reported discharge, hearing loss, or pain.  Nose: No reported congestion, discharge, or epistaxis.  Mouth/throat: No reported dysphagia, hoarseness, or throat pain.  Resp: No reported cough, dyspnea, or wheezing.  Cardiovascular: No reported chest pain, lower extremity edema, or palpitations.   GI: History of hematemesis.  : No reported dysuria, hematuria, or frequency.  Neuro: No reported confusion, memory loss, headaches, or dizziness.  Psych: No reported anxiety, depression, or insomnia.  Musculoskeletal: No reported arthralgia, joint swelling, or myalgias.  Heme/lymph: No reported easy bleeding or bruising, or swollen lymph nodes.  Endocrine: No reported cold/heat intolerance, polydipsia, or polyuria.     Medications:  Prior to Admission medications    Medication Sig Start Date End Date Taking? Authorizing Provider   ARIPiprazole (Abilify) 20 mg tablet Take 1 tablet (20 mg) by mouth once daily. 2/2/24   Historical Provider, MD   bicalutamide (Casodex) 50 mg tablet Take 1 tablet by mouth once daily 1/24/25   Virginia M Factor, DO   cetirizine (ZyrTEC) 10 mg tablet Take 1 tablet (10 mg) by mouth once daily. Allergy medication 6/10/24 6/10/25  Virginia M Factor, DO   estradioL 1.25 mg/1.25 gram (0.1 %) gel in packet Place 3.75 mg on the skin once daily. (3 packets) 8/26/24 8/26/25  Virginia M Factor, DO   hydrocortisone (Anusol-HC) 2.5 % rectal cream Insert into the rectum 2 times a day for 14 days. 8/26/24 3/14/25  Virginia M Factor, DO   hydrOXYzine  pamoate (Vistaril) 25 mg capsule Take 1 capsule (25 mg) by mouth every 12 hours. 11/6/24   Historical Provider, MD   lamoTRIgine (LaMICtal) 150 mg tablet Take 1 tablet (150 mg) by mouth 2 times a day. 4/10/24   Historical Provider, MD   levETIRAcetam (Keppra) 750 mg tablet Take 2 tablets (1,500 mg) by mouth every 12 hours. 3/19/24   Historical Provider, MD   prazosin (Minipress) 1 mg capsule Take 1 capsule (1 mg) by mouth once daily at bedtime. For nightmares 4/22/24   Historical Provider, MD   progesterone (Prometrium) 200 mg capsule Take 1 capsule (200 mg) by mouth once daily. 3/14/25 3/14/26  [facility] Factor, DO   tiZANidine (Zanaflex) 2 mg tablet Take 1 tablet (2 mg) by mouth 2 times a day as needed for muscle spasms. 11/18/24 5/17/25  Virginia M Factor, DO   traZODone (Desyrel) 100 mg tablet Take 1 tablet (100 mg) by mouth once daily at bedtime. 1/10/18   Historical Provider, MD   zonisamide (Zonegran) 100 mg capsule Take 2 capsules (200 mg) by mouth once daily at bedtime. 11/2/18   Historical Provider, MD       Allergies:  Cinnamon; Penicillin v potassium; Penicillins; Petrolatum,white; and Petroleum jelly [white petrolatum]    Objective   Physical exam:  Constitutional: Well developed, well nourished 34 y.o. year old in no acute distress.   Skin: Warm and dry. Normal turgor. No rash, ulcer, trauma, jaundice.   Eyes: Pupils symmetric and reactive to light.  Respiratory: Clear to auscultation bilaterally. No wheezes, rhonchi, or rales heard.  Cardiovascular: Regular rate and rhythm. S1 and S2 appreciated and normal. No murmur, rub, or gallop heard.   Edema: No edema noted.  GI: Normal bowel sounds. Soft, non-distended, mild TTP in LUQ. No rebound or guarding present. No hepatomegaly or splenomegaly appreciated. Abdominal aorta not palpably abnormal.  Musculoskeletal: Limbs and Joints grossly normal. Full range of motion in major joint.   Neuro: Alert and oriented x 3. Cranial nerves 2-12 grossly intact.    Psych: Normal mood and affect.        Relevant Results Recent labs reviewed in the EMR.    Radiology: Reviewed imaging reviewed in the EMR.  Imaging  No results found.    Cardiology, Vascular, and Other Imaging  No other imaging results found for the past 7 days      Assessment/Plan   Problem List Items Addressed This Visit           ICD-10-CM    Hematemesis with nausea K92.0    No reoccurrence of hematemesis. CT scan in ER in February showed moderate to severe gastritis, which was likely the cause of hematemesis. This may be due to NSAIDs and being off PPI. Will restart PPI. EGD ordered for further evaluation. Hemoglobin normal.          Relevant Medications    omeprazole (PriLOSEC) 40 mg DR capsule    Other Relevant Orders    Esophagogastroduodenoscopy (EGD)     Other Visit Diagnoses         Codes      Acute gastritis with hemorrhage, unspecified gastritis type     K29.01    Relevant Medications    omeprazole (PriLOSEC) 40 mg DR capsule    Other Relevant Orders    Esophagogastroduodenoscopy (EGD)          Patient should follow up with Alison King, who she is well established with for chronic GI needs.  Shoshana Horne PA-C

## 2025-04-30 ENCOUNTER — APPOINTMENT (OUTPATIENT)
Facility: CLINIC | Age: 35
End: 2025-04-30
Payer: COMMERCIAL

## 2025-04-30 VITALS
HEART RATE: 90 BPM | SYSTOLIC BLOOD PRESSURE: 115 MMHG | BODY MASS INDEX: 34.72 KG/M2 | HEIGHT: 73 IN | DIASTOLIC BLOOD PRESSURE: 74 MMHG | WEIGHT: 262 LBS

## 2025-04-30 DIAGNOSIS — K92.0 HEMATEMESIS WITH NAUSEA: ICD-10-CM

## 2025-04-30 DIAGNOSIS — K29.01 ACUTE GASTRITIS WITH HEMORRHAGE, UNSPECIFIED GASTRITIS TYPE: ICD-10-CM

## 2025-04-30 PROCEDURE — 1036F TOBACCO NON-USER: CPT | Performed by: PHYSICIAN ASSISTANT

## 2025-04-30 PROCEDURE — 3008F BODY MASS INDEX DOCD: CPT | Performed by: PHYSICIAN ASSISTANT

## 2025-04-30 PROCEDURE — 99214 OFFICE O/P EST MOD 30 MIN: CPT | Performed by: PHYSICIAN ASSISTANT

## 2025-04-30 RX ORDER — OMEPRAZOLE 40 MG/1
40 CAPSULE, DELAYED RELEASE ORAL
Qty: 60 CAPSULE | Refills: 3 | Status: SHIPPED | OUTPATIENT
Start: 2025-04-30 | End: 2025-06-29

## 2025-04-30 NOTE — PATIENT INSTRUCTIONS
Thank you for coming in for your appointment.    -Start daily omeprazole twice daily   -Get EGD done    Follow up with Alison King for chronic GI needs

## 2025-04-30 NOTE — ASSESSMENT & PLAN NOTE
No reoccurrence of hematemesis. CT scan in ER in February showed moderate to severe gastritis, which was likely the cause of hematemesis. This may be due to NSAIDs and being off PPI. Will restart PPI. EGD ordered for further evaluation. Hemoglobin normal.

## 2025-05-01 ENCOUNTER — TELEPHONE (OUTPATIENT)
Dept: PRIMARY CARE | Facility: CLINIC | Age: 35
End: 2025-05-01
Payer: COMMERCIAL

## 2025-05-01 NOTE — TELEPHONE ENCOUNTER
Patient called in to update Pharmacy information due to insurance whe will be switching to [facility] on [address]   Also she wanted to verify upcoming scheduled appointment provided date and time 6/2 at 8:30 am

## 2025-05-06 ENCOUNTER — HOSPITAL ENCOUNTER (EMERGENCY)
Facility: HOSPITAL | Age: 35
Discharge: HOME | End: 2025-05-06
Attending: EMERGENCY MEDICINE
Payer: COMMERCIAL

## 2025-05-06 ENCOUNTER — TELEPHONE (OUTPATIENT)
Dept: PRIMARY CARE | Facility: CLINIC | Age: 35
End: 2025-05-06
Payer: COMMERCIAL

## 2025-05-06 VITALS
RESPIRATION RATE: 18 BRPM | HEART RATE: 51 BPM | HEIGHT: 73 IN | BODY MASS INDEX: 34.85 KG/M2 | SYSTOLIC BLOOD PRESSURE: 126 MMHG | OXYGEN SATURATION: 98 % | TEMPERATURE: 97.7 F | DIASTOLIC BLOOD PRESSURE: 84 MMHG | WEIGHT: 263 LBS

## 2025-05-06 DIAGNOSIS — R11.2 NAUSEA AND VOMITING, UNSPECIFIED VOMITING TYPE: Primary | ICD-10-CM

## 2025-05-06 LAB
ALBUMIN SERPL BCP-MCNC: 4.2 G/DL (ref 3.4–5)
ALP SERPL-CCNC: 53 U/L (ref 33–120)
ALT SERPL W P-5'-P-CCNC: 18 U/L (ref 7–52)
ANION GAP SERPL CALC-SCNC: 10 MMOL/L (ref 10–20)
AST SERPL W P-5'-P-CCNC: 16 U/L (ref 9–39)
BASOPHILS # BLD AUTO: 0.04 X10*3/UL (ref 0–0.1)
BASOPHILS NFR BLD AUTO: 0.5 %
BILIRUB DIRECT SERPL-MCNC: 0.1 MG/DL (ref 0–0.3)
BILIRUB SERPL-MCNC: 0.4 MG/DL (ref 0–1.2)
BUN SERPL-MCNC: 8 MG/DL (ref 6–23)
CALCIUM SERPL-MCNC: 9.2 MG/DL (ref 8.6–10.3)
CHLORIDE SERPL-SCNC: 106 MMOL/L (ref 98–107)
CO2 SERPL-SCNC: 25 MMOL/L (ref 21–32)
CREAT SERPL-MCNC: 1.05 MG/DL (ref 0.5–1.3)
EGFRCR SERPLBLD CKD-EPI 2021: 71 ML/MIN/1.73M*2
EOSINOPHIL # BLD AUTO: 0.08 X10*3/UL (ref 0–0.7)
EOSINOPHIL NFR BLD AUTO: 0.9 %
ERYTHROCYTE [DISTWIDTH] IN BLOOD BY AUTOMATED COUNT: 13 % (ref 11.5–14.5)
GLUCOSE SERPL-MCNC: 97 MG/DL (ref 74–99)
HCT VFR BLD AUTO: 45.9 % (ref 36–52)
HGB BLD-MCNC: 15.6 G/DL (ref 12–17.5)
IMM GRANULOCYTES # BLD AUTO: 0.03 X10*3/UL (ref 0–0.7)
IMM GRANULOCYTES NFR BLD AUTO: 0.4 % (ref 0–0.9)
LIPASE SERPL-CCNC: 17 U/L (ref 9–82)
LYMPHOCYTES # BLD AUTO: 2.52 X10*3/UL (ref 1.2–4.8)
LYMPHOCYTES NFR BLD AUTO: 29.6 %
MAGNESIUM SERPL-MCNC: 2.15 MG/DL (ref 1.6–2.4)
MCH RBC QN AUTO: 30.1 PG (ref 26–34)
MCHC RBC AUTO-ENTMCNC: 34 G/DL (ref 32–36)
MCV RBC AUTO: 88 FL (ref 80–100)
MONOCYTES # BLD AUTO: 0.53 X10*3/UL (ref 0.1–1)
MONOCYTES NFR BLD AUTO: 6.2 %
NEUTROPHILS # BLD AUTO: 5.32 X10*3/UL (ref 1.2–7.7)
NEUTROPHILS NFR BLD AUTO: 62.4 %
NRBC BLD-RTO: 0 /100 WBCS (ref 0–0)
PLATELET # BLD AUTO: 224 X10*3/UL (ref 150–450)
POTASSIUM SERPL-SCNC: 3.4 MMOL/L (ref 3.5–5.3)
PROT SERPL-MCNC: 7.2 G/DL (ref 6.4–8.2)
RBC # BLD AUTO: 5.19 X10*6/UL (ref 4–5.9)
SODIUM SERPL-SCNC: 138 MMOL/L (ref 136–145)
WBC # BLD AUTO: 8.5 X10*3/UL (ref 4.4–11.3)

## 2025-05-06 PROCEDURE — 96374 THER/PROPH/DIAG INJ IV PUSH: CPT

## 2025-05-06 PROCEDURE — 2500000004 HC RX 250 GENERAL PHARMACY W/ HCPCS (ALT 636 FOR OP/ED): Mod: JZ | Performed by: EMERGENCY MEDICINE

## 2025-05-06 PROCEDURE — 84450 TRANSFERASE (AST) (SGOT): CPT | Performed by: EMERGENCY MEDICINE

## 2025-05-06 PROCEDURE — 96375 TX/PRO/DX INJ NEW DRUG ADDON: CPT

## 2025-05-06 PROCEDURE — 36415 COLL VENOUS BLD VENIPUNCTURE: CPT | Performed by: EMERGENCY MEDICINE

## 2025-05-06 PROCEDURE — 82374 ASSAY BLOOD CARBON DIOXIDE: CPT | Performed by: EMERGENCY MEDICINE

## 2025-05-06 PROCEDURE — 83735 ASSAY OF MAGNESIUM: CPT | Performed by: EMERGENCY MEDICINE

## 2025-05-06 PROCEDURE — 85025 COMPLETE CBC W/AUTO DIFF WBC: CPT | Performed by: EMERGENCY MEDICINE

## 2025-05-06 PROCEDURE — 99284 EMERGENCY DEPT VISIT MOD MDM: CPT | Mod: 25 | Performed by: EMERGENCY MEDICINE

## 2025-05-06 PROCEDURE — 83690 ASSAY OF LIPASE: CPT | Performed by: EMERGENCY MEDICINE

## 2025-05-06 RX ORDER — PANTOPRAZOLE SODIUM 40 MG/10ML
40 INJECTION, POWDER, LYOPHILIZED, FOR SOLUTION INTRAVENOUS ONCE
Status: COMPLETED | OUTPATIENT
Start: 2025-05-06 | End: 2025-05-06

## 2025-05-06 RX ORDER — ONDANSETRON HYDROCHLORIDE 2 MG/ML
4 INJECTION, SOLUTION INTRAVENOUS ONCE
Status: COMPLETED | OUTPATIENT
Start: 2025-05-06 | End: 2025-05-06

## 2025-05-06 RX ADMIN — PANTOPRAZOLE SODIUM 40 MG: 40 INJECTION, POWDER, FOR SOLUTION INTRAVENOUS at 01:53

## 2025-05-06 RX ADMIN — SODIUM CHLORIDE 1000 ML: 0.9 INJECTION, SOLUTION INTRAVENOUS at 01:43

## 2025-05-06 RX ADMIN — ONDANSETRON 4 MG: 2 INJECTION INTRAMUSCULAR; INTRAVENOUS at 01:53

## 2025-05-06 ASSESSMENT — LIFESTYLE VARIABLES
HAVE YOU EVER FELT YOU SHOULD CUT DOWN ON YOUR DRINKING: NO
EVER HAD A DRINK FIRST THING IN THE MORNING TO STEADY YOUR NERVES TO GET RID OF A HANGOVER: NO
TOTAL SCORE: 0
HAVE PEOPLE ANNOYED YOU BY CRITICIZING YOUR DRINKING: NO
EVER FELT BAD OR GUILTY ABOUT YOUR DRINKING: NO

## 2025-05-06 ASSESSMENT — PAIN SCALES - GENERAL: PAINLEVEL_OUTOF10: 5 - MODERATE PAIN

## 2025-05-06 ASSESSMENT — PAIN - FUNCTIONAL ASSESSMENT: PAIN_FUNCTIONAL_ASSESSMENT: 0-10

## 2025-05-06 ASSESSMENT — PAIN DESCRIPTION - PAIN TYPE: TYPE: ACUTE PAIN

## 2025-05-06 NOTE — DISCHARGE INSTRUCTIONS
Please be sure to follow-up with your GI physician and take your indigestion medication daily as prescribed

## 2025-05-06 NOTE — Clinical Note
Jennifer Terrell was seen and treated in our emergency department on 5/6/2025.  She may return to work on 05/07/2025.       If you have any questions or concerns, please don't hesitate to call.      Hayley Gaspar MD

## 2025-05-06 NOTE — ED PROVIDER NOTES
HPI:  34-year-old transgender female presents to the emergency department with complaint of vomiting blood.  Symptoms started this evening with left-sided abdominal cramping pain and 3-5 episodes of vomiting food contents mixed with purple appearing blood but then states that the last episode was a pink color.  She recently was seen by GI for similar presentations and was restarted on her PPI which she has been off for a little while she does have known hiatal hernia and gastritis which is thought to be causing her hematemesis    Limitations to history: None  Independent Historians: None  External Records Reviewed: EMR records including outpatient GI office note which plans to have her scheduled for upper endoscopy  ------------------------------------------------------------------------------------------------------------------------------------------  ROS: a ten point review of systems was performed and negative except as per HPI.  ------------------------------------------------------------------------------------------------------------------------------------------  PMH / PSH: as per HPI, reviewed in EMR and discussed with the patient []  MEDS:  reviewed in EMR and discussed with the patient []  ALLERGIES: reviewed in EMR[]  SocH:  as per HPI, otherwise reviewed in EMR []  FH:  as per HPI, otherwise reviewed in EMR []  ------------------------------------------------------------------------------------------------------------------------------------------  Physical Exam:  VS: As documented in the triage note and EMR flowsheet from this visit was reviewed  General: Well appearing. No acute distress.   Eyes:  Extraocular movements grossly intact. No scleral icterus.   HEENT: Atraumatic. Normocephalic.    Neck: Supple. No gross masses  CV: RRR, audible S1/S2, 2+ symmetric peripheral pulses  Resp: Clear to auscultation bilaterally. No respiratory distress.  Non-labored respirations  GI: Soft, non-tender,  non-distended, no rebound or gaurding  MSK: Symmetric muscle bulk. No gross step offs or deformities.  Skin: Warm, dry, no obvious rash.  Neuro: Speech fluent. Awake. Alert. Appropriate conversation.  Psych: Appropriate mood and affect for situation  ------------------------------------------------------------------------------------------------------------------------------------------  Hospital Course / Medical Decision Making:  Independent Interpretations:  EKG -   [XR, CT, US, etc - ]    [Patient is a *** year old *** with a PMHx of chronic medical conditions including *** presenting with a chief complaint of ***]    [Tests/Medications/Escalations of Care considered but not given]    Patient care discussed with: [Admitting team, consultants, social work, THRIVE, SANNILDA, radiology]  Social Determinants affecting care: [Problems affording transportation, inability to afford prescriptions, lack of housing, lack of insurance]    Final diagnosis and disposition: []            Hayley Gaspar MD

## 2025-05-08 DIAGNOSIS — F64.9 GENDER DYSPHORIA: ICD-10-CM

## 2025-05-08 RX ORDER — PROGESTERONE 200 MG/1
200 CAPSULE ORAL DAILY
Qty: 90 CAPSULE | Refills: 3 | Status: SHIPPED | OUTPATIENT
Start: 2025-05-08 | End: 2026-05-08

## 2025-05-08 NOTE — TELEPHONE ENCOUNTER
Med refill , new pharmacy    progesterone (Prometrium) 200 mg capsule [[id number]]     Pharmacy:  NOW [facility]/pharmacy #[id number] [address] AT [facility]

## 2025-05-19 ENCOUNTER — ANESTHESIA EVENT (OUTPATIENT)
Dept: GASTROENTEROLOGY | Facility: HOSPITAL | Age: 35
End: 2025-05-19
Payer: COMMERCIAL

## 2025-05-20 ENCOUNTER — HOSPITAL ENCOUNTER (OUTPATIENT)
Dept: GASTROENTEROLOGY | Facility: HOSPITAL | Age: 35
Discharge: HOME | End: 2025-05-20
Payer: COMMERCIAL

## 2025-05-20 ENCOUNTER — ANESTHESIA (OUTPATIENT)
Dept: GASTROENTEROLOGY | Facility: HOSPITAL | Age: 35
End: 2025-05-20
Payer: COMMERCIAL

## 2025-05-20 VITALS
HEIGHT: 73 IN | OXYGEN SATURATION: 96 % | SYSTOLIC BLOOD PRESSURE: 113 MMHG | TEMPERATURE: 97.3 F | WEIGHT: 267 LBS | BODY MASS INDEX: 35.39 KG/M2 | RESPIRATION RATE: 16 BRPM | HEART RATE: 60 BPM | DIASTOLIC BLOOD PRESSURE: 63 MMHG

## 2025-05-20 DIAGNOSIS — K92.0 HEMATEMESIS WITH NAUSEA: ICD-10-CM

## 2025-05-20 DIAGNOSIS — K29.01 ACUTE GASTRITIS WITH HEMORRHAGE, UNSPECIFIED GASTRITIS TYPE: ICD-10-CM

## 2025-05-20 PROCEDURE — 2500000004 HC RX 250 GENERAL PHARMACY W/ HCPCS (ALT 636 FOR OP/ED): Performed by: NURSE ANESTHETIST, CERTIFIED REGISTERED

## 2025-05-20 PROCEDURE — 7100000009 HC PHASE TWO TIME - INITIAL BASE CHARGE

## 2025-05-20 PROCEDURE — 3700000002 HC GENERAL ANESTHESIA TIME - EACH INCREMENTAL 1 MINUTE

## 2025-05-20 PROCEDURE — 43239 EGD BIOPSY SINGLE/MULTIPLE: CPT | Performed by: INTERNAL MEDICINE

## 2025-05-20 PROCEDURE — 3700000001 HC GENERAL ANESTHESIA TIME - INITIAL BASE CHARGE

## 2025-05-20 PROCEDURE — 2500000004 HC RX 250 GENERAL PHARMACY W/ HCPCS (ALT 636 FOR OP/ED): Mod: JZ | Performed by: INTERNAL MEDICINE

## 2025-05-20 PROCEDURE — 7100000010 HC PHASE TWO TIME - EACH INCREMENTAL 1 MINUTE

## 2025-05-20 RX ORDER — PROPOFOL 10 MG/ML
INJECTION, EMULSION INTRAVENOUS AS NEEDED
Status: DISCONTINUED | OUTPATIENT
Start: 2025-05-20 | End: 2025-05-20

## 2025-05-20 RX ORDER — SODIUM CHLORIDE 9 MG/ML
20 INJECTION, SOLUTION INTRAVENOUS CONTINUOUS
Status: ACTIVE | OUTPATIENT
Start: 2025-05-20 | End: 2025-05-20

## 2025-05-20 RX ORDER — BISMUTH SUBSALICYLATE 262 MG
1 TABLET,CHEWABLE ORAL DAILY
COMMUNITY

## 2025-05-20 RX ADMIN — PROPOFOL 50 MG: 10 INJECTION, EMULSION INTRAVENOUS at 09:23

## 2025-05-20 RX ADMIN — SODIUM CHLORIDE 20 ML/HR: 0.9 INJECTION, SOLUTION INTRAVENOUS at 08:19

## 2025-05-20 RX ADMIN — PROPOFOL 50 MG: 10 INJECTION, EMULSION INTRAVENOUS at 09:22

## 2025-05-20 RX ADMIN — PROPOFOL 50 MG: 10 INJECTION, EMULSION INTRAVENOUS at 09:21

## 2025-05-20 SDOH — HEALTH STABILITY: MENTAL HEALTH: CURRENT SMOKER: 0

## 2025-05-20 ASSESSMENT — PAIN - FUNCTIONAL ASSESSMENT: PAIN_FUNCTIONAL_ASSESSMENT: 0-10

## 2025-05-20 ASSESSMENT — PAIN SCALES - GENERAL
PAINLEVEL_OUTOF10: 0 - NO PAIN
PAIN_LEVEL: 0
PAINLEVEL_OUTOF10: 0 - NO PAIN
PAINLEVEL_OUTOF10: 2
PAINLEVEL_OUTOF10: 0 - NO PAIN
PAINLEVEL_OUTOF10: 0 - NO PAIN

## 2025-05-20 NOTE — ANESTHESIA POSTPROCEDURE EVALUATION
Patient: Jennifer Terrell    Procedure Summary       Date: 05/20/25 Room / Location: [facility]    Anesthesia Start: 0918 Anesthesia Stop: 0937    Procedure: EGD Diagnosis:       Hematemesis with nausea      Acute gastritis with hemorrhage, unspecified gastritis type    Scheduled Providers: Jeffry Salinas DO Responsible Provider: DAMION Nunez-CHITO    Anesthesia Type: MAC ASA Status: 3            Anesthesia Type: MAC    Vitals Value Taken Time   /63 05/20/25 10:00   Temp 36.3 °C (97.3 °F) 05/20/25 10:00   Pulse 60 05/20/25 10:00   Resp 16 05/20/25 10:00   SpO2 96 % 05/20/25 10:00       Anesthesia Post Evaluation    Patient participation: complete - patient participated  Level of consciousness: awake and alert  Pain score: 0  Pain management: adequate  Airway patency: patent  Cardiovascular status: acceptable  Respiratory status: acceptable  Hydration status: acceptable  Postoperative Nausea and Vomiting: none        There were no known notable events for this encounter.

## 2025-05-20 NOTE — ANESTHESIA PREPROCEDURE EVALUATION
Patient: Jennifer Terrell    Procedure Information       Date/Time: 05/20/25 0900    Scheduled providers: Jeffry Salinas DO    Procedure: EGD    Location: [facility]            Relevant Problems   Neuro   (+) Bipolar depression (Multi)   (+) Partial symptomatic epilepsy with complex partial seizures, not intractable, without status epilepticus   (+) TIA (transient ischemic attack)      GI   (+) Dysphagia   (+) GERD (gastroesophageal reflux disease)   (+) Hematemesis with nausea   (+) Hiatal hernia      Musculoskeletal   (+) Chronic low back pain      HEENT   (+) Deaf, left       Clinical information reviewed:   Tobacco  Allergies  Meds  Problems  Med Hx  Surg Hx   Fam Hx  Soc   Hx        NPO Detail:  NPO/Void Status  Date of Last Liquid: 05/19/25  Time of Last Liquid: 2330  Date of Last Solid: 05/19/25  Time of Last Solid: 2300  Last Intake Type: Clear fluids  Time of Last Void: 0630         Physical Exam    Airway  Mallampati: III  TM distance: >3 FB  Neck ROM: full  Mouth opening: 3 or more finger widths     Cardiovascular - normal exam   Dental - normal exam       Pulmonary - normal exam   Abdominal (+) obese             Anesthesia Plan    History of general anesthesia?: yes  History of complications of general anesthesia?: no    ASA 3     MAC     The patient is not a current smoker.    intravenous induction   Anesthetic plan and risks discussed with patient.  Use of blood products discussed with patient who.    Plan discussed with attending.

## 2025-05-27 DIAGNOSIS — M54.42 CHRONIC LEFT-SIDED LOW BACK PAIN WITH LEFT-SIDED SCIATICA: ICD-10-CM

## 2025-05-27 DIAGNOSIS — G89.29 CHRONIC LEFT-SIDED LOW BACK PAIN WITH LEFT-SIDED SCIATICA: ICD-10-CM

## 2025-05-27 RX ORDER — TIZANIDINE 2 MG/1
TABLET ORAL
Qty: 60 TABLET | Refills: 1 | Status: SHIPPED | OUTPATIENT
Start: 2025-05-27

## 2025-05-27 NOTE — TELEPHONE ENCOUNTER
Pharmacy request med refill  tiZANidine (Zanaflex) 2 mg tablet   [facility]     LOV 3/14/2025  NOV 6/2/2025  Cancellations 2/27/2025

## 2025-05-29 LAB
LABORATORY COMMENT REPORT: NORMAL
PATH REPORT.FINAL DX SPEC: NORMAL
PATH REPORT.GROSS SPEC: NORMAL
PATH REPORT.RELEVANT HX SPEC: NORMAL
PATH REPORT.TOTAL CANCER: NORMAL

## 2025-06-01 NOTE — PATIENT INSTRUCTIONS
Thank you for choosing [facility] for your healthcare.   As always if you have any questions or concerns please do not hesitate to call our office at [phone] or through [employer].    Have a great day!  Minda Veliz, DO

## 2025-06-02 ENCOUNTER — APPOINTMENT (OUTPATIENT)
Dept: PRIMARY CARE | Facility: CLINIC | Age: 35
End: 2025-06-02
Payer: MEDICAID

## 2025-06-02 VITALS
OXYGEN SATURATION: 99 % | RESPIRATION RATE: 20 BRPM | WEIGHT: 264 LBS | DIASTOLIC BLOOD PRESSURE: 73 MMHG | SYSTOLIC BLOOD PRESSURE: 117 MMHG | TEMPERATURE: 97.7 F | BODY MASS INDEX: 35.76 KG/M2 | HEIGHT: 72 IN | HEART RATE: 74 BPM

## 2025-06-02 DIAGNOSIS — Z00.00 ANNUAL PHYSICAL EXAM: Primary | ICD-10-CM

## 2025-06-02 DIAGNOSIS — E87.6 HYPOKALEMIA: ICD-10-CM

## 2025-06-02 DIAGNOSIS — L72.0 EPIDERMAL INCLUSION CYST: ICD-10-CM

## 2025-06-02 DIAGNOSIS — F64.9 GENDER DYSPHORIA: ICD-10-CM

## 2025-06-02 PROBLEM — R60.0 PERIPHERAL EDEMA: Status: RESOLVED | Noted: 2023-04-13 | Resolved: 2025-06-02

## 2025-06-02 PROCEDURE — 1036F TOBACCO NON-USER: CPT | Performed by: FAMILY MEDICINE

## 2025-06-02 PROCEDURE — 99395 PREV VISIT EST AGE 18-39: CPT | Performed by: FAMILY MEDICINE

## 2025-06-02 PROCEDURE — 3008F BODY MASS INDEX DOCD: CPT | Performed by: FAMILY MEDICINE

## 2025-06-02 SDOH — ECONOMIC STABILITY: FOOD INSECURITY: WITHIN THE PAST 12 MONTHS, YOU WORRIED THAT YOUR FOOD WOULD RUN OUT BEFORE YOU GOT MONEY TO BUY MORE.: NEVER TRUE

## 2025-06-02 SDOH — ECONOMIC STABILITY: FOOD INSECURITY: WITHIN THE PAST 12 MONTHS, THE FOOD YOU BOUGHT JUST DIDN'T LAST AND YOU DIDN'T HAVE MONEY TO GET MORE.: NEVER TRUE

## 2025-06-02 ASSESSMENT — LIFESTYLE VARIABLES
AUDIT-C TOTAL SCORE: 0
HOW OFTEN DO YOU HAVE A DRINK CONTAINING ALCOHOL: NEVER
HOW OFTEN DO YOU HAVE SIX OR MORE DRINKS ON ONE OCCASION: NEVER
SKIP TO QUESTIONS 9-10: 1
HOW MANY STANDARD DRINKS CONTAINING ALCOHOL DO YOU HAVE ON A TYPICAL DAY: PATIENT DOES NOT DRINK

## 2025-06-02 ASSESSMENT — PAIN SCALES - GENERAL: PAINLEVEL_OUTOF10: 0-NO PAIN

## 2025-06-02 NOTE — PROGRESS NOTES
Subjective   Patient ID: Jennifer Terrell is a 34 y.o. adult who presents for Annual Exam (Sore inflamed scar tissue on back of neck ) and Back Pain.  She is concerned about a tender lump on her upper back/neck.          Patient Care Team:  Minda Veliz DO as PCP - General  Minda Veliz DO as PCP - [facility] PCP  Nadia Orantes as Therapist (Psychology)  DAMION Macdonald-CNP as Nurse Practitioner (Gastroenterology)  Dipti Rosa MD MPH as Consulting Physician (Urology)  DAMION Starks-CNS as Nurse Practitioner (Psychiatry)    Current Medications[1]    Objective     Visit Vitals  /73 (BP Location: Right arm, Patient Position: Sitting, BP Cuff Size: Adult long)   Pulse 74   Temp 36.5 °C (97.7 °F) (Temporal)   Resp 20   Ht 1.829 m (6')   Wt 120 kg (264 lb)   SpO2 99%   BMI 35.80 kg/m²   Smoking Status Never   BSA 2.47 m²        Constitutional: Well nourished, well developed, appears stated age  Eyes: no scleral icterus, no conjunctival injection  Ears: normal external auditory canal, no retraction or bulging of tympanic membranes  Neck: no thyromegaly  Cardiovascular: regular rate and rhythm, no leg edema  Respiratory: normal respiratory effort, clear to auscultation bilaterally  Musculoskeletal: No gross deformities appreciated  Skin: Warm, dry. Epidermal inclusion cyst? Of left upper back  Neurologic: Alert, CNs II-XII grossly intact..  Psych: Appropriate mood and affect.        Assessment & Plan  Annual physical exam  Preventative care current       Gender dysphoria  Continue estradiol and progesterone  Planning on bottom surgery in August  Orders:    Estradiol; Future    Testosterone, total and free; Future      Hypokalemia  recheck  Orders:    Basic metabolic panel; Future    Epidermal inclusion cyst    Orders:    Referral to General Surgery; Future       All pertinent lab work and results were reviewed with patient.     Follow up 4 months.    Minda Veliz DO         [1]    Current Outpatient Medications   Medication Sig Dispense Refill    ARIPiprazole (Abilify) 20 mg tablet Take 1 tablet (20 mg) by mouth once daily.      bicalutamide (Casodex) 50 mg tablet Take 1 tablet by mouth once daily 90 tablet 1    cetirizine (ZyrTEC) 10 mg tablet Take 1 tablet (10 mg) by mouth once daily. Allergy medication 90 tablet 3    estradioL 1.25 mg/1.25 gram (0.1 %) gel in packet Place 3.75 mg on the skin once daily. (3 packets) 112.5 g 11    hydrocortisone (Anusol-HC) 2.5 % rectal cream Insert into the rectum 2 times a day for 14 days. 30 g 0    hydrOXYzine pamoate (Vistaril) 25 mg capsule Take 1 capsule (25 mg) by mouth every 12 hours.      lamoTRIgine (LaMICtal) 150 mg tablet Take 1 tablet (150 mg) by mouth 2 times a day.      levETIRAcetam (Keppra) 750 mg tablet Take 2 tablets (1,500 mg) by mouth every 12 hours.      multivitamin tablet Take 1 tablet by mouth once daily.      omeprazole (PriLOSEC) 40 mg DR capsule Take 1 capsule (40 mg) by mouth 2 times a day before meals. Do not crush or chew. 60 capsule 3    prazosin (Minipress) 1 mg capsule Take 1 capsule (1 mg) by mouth once daily at bedtime. For nightmares      progesterone (Prometrium) 200 mg capsule Take 1 capsule (200 mg) by mouth once daily. 90 capsule 3    tiZANidine (Zanaflex) 2 mg tablet TAKE 1 TABLET BY MOUTH TWICE A DAY AS NEEDED FOR MUSCLE SPASMS 60 tablet 1    traZODone (Desyrel) 100 mg tablet Take 1 tablet (100 mg) by mouth once daily at bedtime.      zonisamide (Zonegran) 100 mg capsule Take 2 capsules (200 mg) by mouth once daily at bedtime.       No current facility-administered medications for this visit.

## 2025-06-02 NOTE — ASSESSMENT & PLAN NOTE
Continue estradiol and progesterone  Planning on bottom surgery in August  Orders:    Estradiol; Future    Testosterone, total and free; Future

## 2025-06-04 ENCOUNTER — TREATMENT (OUTPATIENT)
Dept: PHYSICAL THERAPY | Facility: CLINIC | Age: 35
End: 2025-06-04
Payer: COMMERCIAL

## 2025-06-04 DIAGNOSIS — M62.89 PELVIC FLOOR TENSION: ICD-10-CM

## 2025-06-04 PROCEDURE — 97110 THERAPEUTIC EXERCISES: CPT | Mod: GP

## 2025-06-04 PROCEDURE — 97535 SELF CARE MNGMENT TRAINING: CPT | Mod: GP

## 2025-06-04 NOTE — PROGRESS NOTES
PHYSICAL THERAPY   TREATMENT NOTE    Patient Name:  Jennifer Terrell   Patient MRN: [id number]  Date: 06/04/25    Time Calculation  Start Time: 0936  Stop Time: 1018  Time Calculation (min): 42 min    Insurance:  Visit number: 3  Insurance Type: [employer]   Approved # of visits: 24  Authorization Needed: yes  Cert Date Ends On: 4-25-2, [place] 88128 ([id number], [id number], 30030, [id number] & [id number] APPROVED)     General:  Reason for visit: gender dysphoria; preop strengthening   Referred by: Nestor Orellana diagnoses:   1. Pelvic floor tension  Follow Up In Physical Therapy           Assessment:    Discussed patient's current bowel/bladder symptoms. Pt had isolated episode of fecal urgency with slight inability to hold stool. This does not happen often or regularly. Anticipate that this is not a true pelvic floor dysfunction but rather an isolated event. Otherwise, no pelvic floor impairments and ok to proceed with generalized strengthening preoperatively.   Pain levels were unchanged at the end of the treatment.    Plan:  1) Will see again in july for pre surgery recheck of POC     Subjective:  Right knee pain  Surgery scheduled in August 2025, lost the exercise program and hasn't been exercising   No bowel complaints  Pain (0-10): 0/10 Lumbar spine     Precautions:  PMH: gender dysphoria, L deafness, obesity, dysphagia, GERD, hiatal hernia, hemorrhoids, bipolad depression, chronic low back pain, epilepsy, facial myokymia, TIA  Fall Risk: yes     Objective:    Treatment Performed:   Therapeutic Exercise:  12 minutes  Review and add HEP   Access Code: [email or web address]  URL: [email or web address]  Date: 06/04/2025  Prepared by: Aniya Patterson    Exercises  - Clamshell  - 1 x daily - 7 x weekly - 2 sets - 10 reps - 5 seconds  hold  - Sidelying Hip Abduction  - 1 x daily - 7 x weekly - 2 sets - 10 reps - 5 seconds  hold  - Bird Dog  - 1 x daily - 7 x weekly - 2 sets - 10 reps - 5 seconds  hold  - Sidelying  Reverse Agathal  - 1 x daily - 7 x weekly - 2 sets - 10 reps - 5 seconds  hold  Therapeutic Activity:   minutes     Self Care: 30 minutes  Discussed post op dilation, bowel and bladder dysfunction  Manual Therapy:   minutes    Neuromuscular Re-education:   minutes    Gait Training:    minutes    Modalities:    minutes    Dry Needling:   minutes

## 2025-06-12 ENCOUNTER — TELEPHONE (OUTPATIENT)
Dept: PRIMARY CARE | Facility: CLINIC | Age: 35
End: 2025-06-12
Payer: COMMERCIAL

## 2025-06-12 DIAGNOSIS — J30.89 ENVIRONMENTAL AND SEASONAL ALLERGIES: ICD-10-CM

## 2025-06-12 RX ORDER — CETIRIZINE HYDROCHLORIDE 10 MG/1
10 TABLET ORAL DAILY
Qty: 90 TABLET | Refills: 3 | Status: SHIPPED | OUTPATIENT
Start: 2025-06-12 | End: 2026-06-12

## 2025-06-12 NOTE — TELEPHONE ENCOUNTER
Prescription  and needs sent to new pharmacy    Cetirizine     Sent to [facility] in [place]

## 2025-06-16 ENCOUNTER — APPOINTMENT (OUTPATIENT)
Dept: SURGERY | Facility: CLINIC | Age: 35
End: 2025-06-16
Payer: COMMERCIAL

## 2025-06-16 VITALS
HEART RATE: 64 BPM | SYSTOLIC BLOOD PRESSURE: 123 MMHG | BODY MASS INDEX: 35.8 KG/M2 | WEIGHT: 264.3 LBS | DIASTOLIC BLOOD PRESSURE: 80 MMHG | HEIGHT: 72 IN | OXYGEN SATURATION: 97 %

## 2025-06-16 DIAGNOSIS — L72.0 EPIDERMAL INCLUSION CYST: Primary | ICD-10-CM

## 2025-06-16 DIAGNOSIS — K29.01 ACUTE GASTRITIS WITH HEMORRHAGE, UNSPECIFIED GASTRITIS TYPE: ICD-10-CM

## 2025-06-16 DIAGNOSIS — K92.0 HEMATEMESIS WITH NAUSEA: ICD-10-CM

## 2025-06-16 PROCEDURE — 3008F BODY MASS INDEX DOCD: CPT | Performed by: SURGERY

## 2025-06-16 PROCEDURE — 99204 OFFICE O/P NEW MOD 45 MIN: CPT | Performed by: SURGERY

## 2025-06-16 PROCEDURE — 1036F TOBACCO NON-USER: CPT | Performed by: SURGERY

## 2025-06-16 RX ORDER — OMEPRAZOLE 40 MG/1
CAPSULE, DELAYED RELEASE ORAL
Qty: 180 CAPSULE | Refills: 1 | Status: SHIPPED | OUTPATIENT
Start: 2025-06-16

## 2025-06-16 ASSESSMENT — ENCOUNTER SYMPTOMS
COUGH: 0
DIZZINESS: 0
SHORTNESS OF BREATH: 0
HEADACHES: 0
PALPITATIONS: 0
FEVER: 0
CHILLS: 0

## 2025-06-16 NOTE — PROGRESS NOTES
GENERAL SURGERY CLINIC NOTE    Jennifer Terrell   1990   [id number]     History Of Present Illness  Jennifer Terrell is a 34 y.o. adult who presents to the office for evaluation of a cyst on her upper back. She has had this for years and it has gotten more painful recently. Removal was attempted a few years ago unsuccessfully. It has not been infected.    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), Chronic headaches, Chronic low back pain (04/13/2023), Chronic pain disorder, Depression, Dizziness, Dorsalgia, unspecified (06/24/2020), Easy bruising, Flushing (01/06/2021), Gender dysphoria, Generalized abdominal pain (11/02/2021), GERD (gastroesophageal reflux disease), Headache, Hiatal hernia, HL (hearing loss), Kidney stone (10/20(?)/24), Local infection of the skin and subcutaneous tissue, unspecified (10/06/2020), Nausea (03/10/2021), Other forms of dyspnea (03/08/2018), Other hemorrhoids (07/15/2021), Other skin changes (07/08/2021), Otitis media, unspecified, left ear (05/01/2020), Personal history of COVID-19, Personal history of diseases of the skin and subcutaneous tissue (03/10/2021), Personal history of other diseases of the respiratory system (10/06/2021), Personal history of other diseases of the respiratory system (10/06/2021), Personal history of other specified conditions (08/17/2021), Personal history of other specified conditions (05/03/2018), Personal history of other specified conditions (05/03/2018), Personal history of other specified conditions (05/03/2018), PONV (postoperative nausea and vomiting), PTSD (post-traumatic stress disorder), Seasonal asthma ([facility]), and Seizure disorder (Multi).    Surgical History  She has a past surgical  history that includes Inner ear surgery (03/08/2018); Tonsillectomy (03/08/2018); Other surgical history (04/05/2022); Other surgical history (08/06/2021); Other surgical history (08/06/2021); and Esophagogastroduodenoscopy (05/20/2025).    Medications  Medications Ordered Prior to Encounter[1]    Allergies  Cinnamon; Penicillin v potassium; Penicillins; Petrolatum,white; and Petroleum jelly [white petrolatum]     Social History  She reports that she has never smoked. She has never been exposed to tobacco smoke. She has never used smokeless tobacco. She reports current alcohol use. She reports current drug use. Drug: Marijuana.    Family History  Family History[2]     Review of Systems   Constitutional:  Negative for chills and fever.   Respiratory:  Negative for cough and shortness of breath.    Cardiovascular:  Negative for chest pain and palpitations.   Neurological:  Negative for dizziness and headaches.   All other systems reviewed and are negative.      Last Recorded Vitals  Blood pressure 123/80, pulse 64, height 1.829 m (6'), weight 120 kg (264 lb 4.8 oz), SpO2 97%.     Physical Exam  Constitutional:       General: She is not in acute distress.     Appearance: Normal appearance. She is not ill-appearing.   HENT:      Head: Normocephalic and atraumatic.   Cardiovascular:      Rate and Rhythm: Normal rate and regular rhythm.   Pulmonary:      Effort: Pulmonary effort is normal. No respiratory distress.      Breath sounds: Normal breath sounds.   Musculoskeletal:         General: No swelling.   Skin:     General: Skin is warm and dry.      Comments: L upper back just below the neck: there is a 1x1cm mass consistent with a noninfected cyst   Neurological:      Mental Status: She is alert and oriented to person, place, and time. Mental status is at baseline.   Psychiatric:         Mood and Affect: Mood normal.         Behavior: Behavior normal.          Relevant Results  None       Assessment and Plan  34 y.o.  adult with a cyst on her left upper back that has become more symptomatic. I recommend in office excision and she was amenable. The risks and benefits of the procedure were discussed. Risks include allergic reactions, bleeding, and infection. The patient expressed understanding and provided informed consent for the procedure.    The patient will be scheduled for an in office excision under local anesthesia.     Shante Darling MD, [facility]  General Surgery        [1]   Current Outpatient Medications on File Prior to Visit   Medication Sig Dispense Refill    ARIPiprazole (Abilify) 20 mg tablet Take 1 tablet (20 mg) by mouth once daily.      bicalutamide (Casodex) 50 mg tablet Take 1 tablet by mouth once daily 90 tablet 1    cetirizine (ZyrTEC) 10 mg tablet Take 1 tablet (10 mg) by mouth once daily. Allergy medication 90 tablet 3    estradioL 1.25 mg/1.25 gram (0.1 %) gel in packet Place 3.75 mg on the skin once daily. (3 packets) 112.5 g 11    hydrOXYzine pamoate (Vistaril) 25 mg capsule Take 1 capsule (25 mg) by mouth every 12 hours.      lamoTRIgine (LaMICtal) 150 mg tablet Take 1 tablet (150 mg) by mouth 2 times a day.      levETIRAcetam (Keppra) 750 mg tablet Take 2 tablets (1,500 mg) by mouth every 12 hours.      multivitamin tablet Take 1 tablet by mouth once daily.      omeprazole (PriLOSEC) 40 mg DR capsule Take 1 capsule (40 mg) by mouth 2 times a day before meals. Do not crush or chew. 60 capsule 3    prazosin (Minipress) 1 mg capsule Take 1 capsule (1 mg) by mouth once daily at bedtime. For nightmares      progesterone (Prometrium) 200 mg capsule Take 1 capsule (200 mg) by mouth once daily. 90 capsule 3    tiZANidine (Zanaflex) 2 mg tablet TAKE 1 TABLET BY MOUTH TWICE A DAY AS NEEDED FOR MUSCLE SPASMS 60 tablet 1    traZODone (Desyrel) 100 mg tablet Take 1 tablet (100 mg) by mouth once daily at bedtime.      zonisamide (Zonegran) 100 mg capsule Take 2 capsules (200 mg) by mouth once daily at bedtime.       hydrocortisone (Anusol-HC) 2.5 % rectal cream Insert into the rectum 2 times a day for 14 days. 30 g 0    [DISCONTINUED] cetirizine (ZyrTEC) 10 mg tablet Take 1 tablet (10 mg) by mouth once daily. Allergy medication 90 tablet 3     No current facility-administered medications on file prior to visit.   [2]   Family History  Problem Relation Name Age of Onset    Other (cva) Other Kristian Terrell (Father)     Epilepsy Other Kristian Terrell (Father)     Ulcerative colitis Other Kristian Terrell (Father)     Hypertension Other Kristian Terrell (Father)     Stroke Mother Carissa Daly     Alcohol abuse Father Kristian Terrell     Alcohol abuse Brother Rolando Terrell

## 2025-06-17 ENCOUNTER — APPOINTMENT (OUTPATIENT)
Dept: CARDIOLOGY | Facility: HOSPITAL | Age: 35
End: 2025-06-17
Payer: COMMERCIAL

## 2025-06-17 ENCOUNTER — APPOINTMENT (OUTPATIENT)
Dept: RADIOLOGY | Facility: HOSPITAL | Age: 35
End: 2025-06-17
Payer: COMMERCIAL

## 2025-06-17 ENCOUNTER — HOSPITAL ENCOUNTER (EMERGENCY)
Facility: HOSPITAL | Age: 35
Discharge: HOME | End: 2025-06-17
Attending: EMERGENCY MEDICINE
Payer: COMMERCIAL

## 2025-06-17 VITALS
SYSTOLIC BLOOD PRESSURE: 130 MMHG | HEIGHT: 72 IN | RESPIRATION RATE: 16 BRPM | OXYGEN SATURATION: 98 % | WEIGHT: 267 LBS | HEART RATE: 61 BPM | DIASTOLIC BLOOD PRESSURE: 84 MMHG | BODY MASS INDEX: 36.16 KG/M2 | TEMPERATURE: 98.9 F

## 2025-06-17 DIAGNOSIS — R07.9 CHEST PAIN, UNSPECIFIED TYPE: Primary | ICD-10-CM

## 2025-06-17 LAB
ALBUMIN SERPL BCP-MCNC: 4.4 G/DL (ref 3.4–5)
ALP SERPL-CCNC: 57 U/L (ref 33–120)
ALT SERPL W P-5'-P-CCNC: 20 U/L (ref 7–52)
ANION GAP SERPL CALC-SCNC: 10 MMOL/L (ref 10–20)
AST SERPL W P-5'-P-CCNC: 17 U/L (ref 9–39)
BASOPHILS # BLD AUTO: 0.02 X10*3/UL (ref 0–0.1)
BASOPHILS NFR BLD AUTO: 0.3 %
BILIRUB SERPL-MCNC: 0.3 MG/DL (ref 0–1.2)
BUN SERPL-MCNC: 11 MG/DL (ref 6–23)
CALCIUM SERPL-MCNC: 9.1 MG/DL (ref 8.6–10.3)
CARDIAC TROPONIN I PNL SERPL HS: <3 NG/L (ref 0–20)
CARDIAC TROPONIN I PNL SERPL HS: <3 NG/L (ref 0–20)
CHLORIDE SERPL-SCNC: 107 MMOL/L (ref 98–107)
CO2 SERPL-SCNC: 24 MMOL/L (ref 21–32)
CREAT SERPL-MCNC: 1.06 MG/DL (ref 0.5–1.3)
EGFRCR SERPLBLD CKD-EPI 2021: 70 ML/MIN/1.73M*2
EOSINOPHIL # BLD AUTO: 0.07 X10*3/UL (ref 0–0.7)
EOSINOPHIL NFR BLD AUTO: 0.9 %
ERYTHROCYTE [DISTWIDTH] IN BLOOD BY AUTOMATED COUNT: 12.7 % (ref 11.5–14.5)
GLUCOSE SERPL-MCNC: 95 MG/DL (ref 74–99)
HCT VFR BLD AUTO: 41 % (ref 36–52)
HGB BLD-MCNC: 14 G/DL (ref 12–17.5)
IMM GRANULOCYTES # BLD AUTO: 0.02 X10*3/UL (ref 0–0.7)
IMM GRANULOCYTES NFR BLD AUTO: 0.3 % (ref 0–0.9)
LYMPHOCYTES # BLD AUTO: 2.39 X10*3/UL (ref 1.2–4.8)
LYMPHOCYTES NFR BLD AUTO: 31.6 %
MCH RBC QN AUTO: 30 PG (ref 26–34)
MCHC RBC AUTO-ENTMCNC: 34.1 G/DL (ref 32–36)
MCV RBC AUTO: 88 FL (ref 80–100)
MONOCYTES # BLD AUTO: 0.51 X10*3/UL (ref 0.1–1)
MONOCYTES NFR BLD AUTO: 6.7 %
NEUTROPHILS # BLD AUTO: 4.55 X10*3/UL (ref 1.2–7.7)
NEUTROPHILS NFR BLD AUTO: 60.2 %
NRBC BLD-RTO: 0 /100 WBCS (ref 0–0)
PLATELET # BLD AUTO: 236 X10*3/UL (ref 150–450)
POTASSIUM SERPL-SCNC: 3.8 MMOL/L (ref 3.5–5.3)
PROT SERPL-MCNC: 7.3 G/DL (ref 6.4–8.2)
RBC # BLD AUTO: 4.67 X10*6/UL (ref 4–5.9)
SODIUM SERPL-SCNC: 137 MMOL/L (ref 136–145)
WBC # BLD AUTO: 7.6 X10*3/UL (ref 4.4–11.3)

## 2025-06-17 PROCEDURE — 84484 ASSAY OF TROPONIN QUANT: CPT | Performed by: NURSE PRACTITIONER

## 2025-06-17 PROCEDURE — 93005 ELECTROCARDIOGRAM TRACING: CPT

## 2025-06-17 PROCEDURE — 99285 EMERGENCY DEPT VISIT HI MDM: CPT | Mod: 25 | Performed by: EMERGENCY MEDICINE

## 2025-06-17 PROCEDURE — 71275 CT ANGIOGRAPHY CHEST: CPT

## 2025-06-17 PROCEDURE — 96374 THER/PROPH/DIAG INJ IV PUSH: CPT

## 2025-06-17 PROCEDURE — 2550000001 HC RX 255 CONTRASTS: Performed by: NURSE PRACTITIONER

## 2025-06-17 PROCEDURE — 71275 CT ANGIOGRAPHY CHEST: CPT | Performed by: RADIOLOGY

## 2025-06-17 PROCEDURE — 36415 COLL VENOUS BLD VENIPUNCTURE: CPT | Performed by: NURSE PRACTITIONER

## 2025-06-17 PROCEDURE — 80053 COMPREHEN METABOLIC PANEL: CPT | Performed by: NURSE PRACTITIONER

## 2025-06-17 PROCEDURE — 85025 COMPLETE CBC W/AUTO DIFF WBC: CPT | Performed by: NURSE PRACTITIONER

## 2025-06-17 PROCEDURE — 2500000004 HC RX 250 GENERAL PHARMACY W/ HCPCS (ALT 636 FOR OP/ED): Mod: JW | Performed by: NURSE PRACTITIONER

## 2025-06-17 RX ORDER — KETOROLAC TROMETHAMINE 30 MG/ML
15 INJECTION, SOLUTION INTRAMUSCULAR; INTRAVENOUS ONCE
Status: COMPLETED | OUTPATIENT
Start: 2025-06-17 | End: 2025-06-17

## 2025-06-17 RX ADMIN — IOHEXOL 75 ML: 350 INJECTION, SOLUTION INTRAVENOUS at 20:46

## 2025-06-17 RX ADMIN — KETOROLAC TROMETHAMINE 15 MG: 30 INJECTION, SOLUTION INTRAMUSCULAR at 20:02

## 2025-06-17 ASSESSMENT — PAIN SCALES - GENERAL: PAINLEVEL_OUTOF10: 8

## 2025-06-17 ASSESSMENT — PAIN - FUNCTIONAL ASSESSMENT: PAIN_FUNCTIONAL_ASSESSMENT: 0-10

## 2025-06-17 NOTE — ED PROVIDER NOTES
HPI   Chief Complaint   Patient presents with    Chest Pain     Pt chest pain started thirty minutes prior to calling 911. Pt was talking on the phoen when chest pain started. Pt has never had chest pain episodes before. Pt describes the pain as sharp and burning, stabbing. Pt received 324 ASA and 1 oral nitroglycerin by ems       This is a 34-year-old  transgender female, that is presenting to the emergency room with complaints of chest pain.  Symptoms started approximately 3 hours ago.  Is located in the epigastric/midsternal region.  Reports that there was pain that radiated over to her left arm.  Now she is only experiencing mild numbness.  She has had the arm discomfort in the past.  Reports no significant history for chest pain.  Denies any cardiac history.  She did not have any associated shortness of breath, dizziness, palpitations, abdominal pain, nausea, diaphoresis, syncope, or headache.  Denies any fevers or cold-like symptoms.  Not experiencing any alteration in her urine or bowel function. The patient denies any history of pulmonary embolism or DVT.  Patient is not experiencing any lower extremity swelling or pain.  Denies any recent travel, hospitalization, cancer, or surgery.  Was given 1 sublingual nitro and 324 mg of aspirin by EMS.      History provided by:  Patient   used: No                          [place] Coma Scale Score: 15                  Patient History   Medical History[1]  Surgical History[2]  Family History[3]  Social History[4]    Physical Exam   Visit Vitals  /84   Pulse 61   Temp 37.2 °C (98.9 °F)   Resp 16   Ht 1.829 m (6')   Wt 121 kg (267 lb)   SpO2 98%   BMI 36.21 kg/m²   Smoking Status Never   BSA 2.48 m²      Physical Exam  Vitals and nursing note reviewed.   Constitutional:       General: She is not in acute distress.     Appearance: She is well-developed.   HENT:      Head: Normocephalic and atraumatic.   Eyes:      Conjunctiva/sclera:  Conjunctivae normal.   Cardiovascular:      Rate and Rhythm: Normal rate and regular rhythm.      Heart sounds: No murmur heard.  Pulmonary:      Effort: Pulmonary effort is normal. No respiratory distress.      Breath sounds: Normal breath sounds.   Chest:      Chest wall: No deformity, tenderness or crepitus.   Abdominal:      Palpations: Abdomen is soft.      Tenderness: There is no abdominal tenderness.   Musculoskeletal:         General: No swelling.      Cervical back: Neck supple.   Skin:     General: Skin is warm and dry.      Capillary Refill: Capillary refill takes less than 2 seconds.   Neurological:      Mental Status: She is alert.   Psychiatric:         Mood and Affect: Mood normal.         CT angio chest for pulmonary embolism   Final Result   No pulmonary embolism or acute cardiopulmonary process.             MACRO:   None        Signed by: Marisela Urena 6/17/2025 9:43 PM   Dictation workstation:   [id number]          Labs Reviewed   COMPREHENSIVE METABOLIC PANEL - Normal       Result Value    Glucose 95      Sodium 137      Potassium 3.8      Chloride 107      Bicarbonate 24      Anion Gap 10      Urea Nitrogen 11      Creatinine 1.06      eGFR 70      Calcium 9.1      Albumin 4.4      Alkaline Phosphatase 57      Total Protein 7.3      AST 17      Bilirubin, Total 0.3      ALT 20     SERIAL TROPONIN-INITIAL - Normal    Troponin I, High Sensitivity <3      Narrative:     Less than 99th percentile of normal range cutoff-  Female and children under 18 years old <14 ng/L; Male <21 ng/L: Negative  Repeat testing should be performed if clinically indicated.     Female and children under 18 years old 14-50 ng/L; Male 21-50 ng/L:  Consistent with possible cardiac damage and possible increased clinical   risk. Serial measurements may help to assess extent of myocardial damage.     >50 ng/L: Consistent with cardiac damage, increased clinical risk and  myocardial infarction. Serial measurements may help  assess extent of   myocardial damage.      NOTE: Children less than 1 year old may have higher baseline troponin   levels and results should be interpreted in conjunction with the overall   clinical context.     NOTE: Troponin I testing is performed using a different   testing methodology at [facility] than at other   [facility]. Direct result comparisons should only   be made within the same method.   SERIAL TROPONIN, 1 HOUR - Normal    Troponin I, High Sensitivity <3      Narrative:     Less than 99th percentile of normal range cutoff-  Female and children under 18 years old <14 ng/L; Male <21 ng/L: Negative  Repeat testing should be performed if clinically indicated.     Female and children under 18 years old 14-50 ng/L; Male 21-50 ng/L:  Consistent with possible cardiac damage and possible increased clinical   risk. Serial measurements may help to assess extent of myocardial damage.     >50 ng/L: Consistent with cardiac damage, increased clinical risk and  myocardial infarction. Serial measurements may help assess extent of   myocardial damage.      NOTE: Children less than 1 year old may have higher baseline troponin   levels and results should be interpreted in conjunction with the overall   clinical context.     NOTE: Troponin I testing is performed using a different   testing methodology at [facility] than at other   [facility]. Direct result comparisons should only   be made within the same method.   CBC WITH AUTO DIFFERENTIAL    WBC 7.6      nRBC 0.0      RBC 4.67      Hemoglobin 14.0      Hematocrit 41.0      MCV 88      MCH 30.0      MCHC 34.1      RDW 12.7      Platelets 236      Neutrophils % 60.2      Immature Granulocytes %, Automated 0.3      Lymphocytes % 31.6      Monocytes % 6.7      Eosinophils % 0.9      Basophils % 0.3      Neutrophils Absolute 4.55      Immature Granulocytes Absolute, Automated 0.02      Lymphocytes Absolute 2.39      Monocytes Absolute  0.51      Eosinophils Absolute 0.07      Basophils Absolute 0.02     TROPONIN SERIES- (INITIAL, 1 HR)    Narrative:     The following orders were created for panel order Troponin I Series, High Sensitivity (0, 1 HR).  Procedure                               Abnormality         Status                     ---------                               -----------         ------                     Troponin I, High Sensiti...[[id number]]  Normal              Final result               Troponin, High Sensitivi...[[id number]]  Normal              Final result                 Please view results for these tests on the individual orders.         ED Course & MDM   ED Course as of 06/17/25 2211 Tue Jun 17, 2025 2035 ECG 12 lead  Twelve-lead EKG interpreted by me.  Sinus rhythm at a rate of 64.  [place] interval is 184, QRS is 96, QT is 402, QTc is 415.  Normal axis.  Normal interval.  Nonspecific T wave abnormalities in anterior leads.  No acute ischemia or injury pattern noted. [CT]   2156 This patient was seen by the advanced practice provider.  I have personally performed a substantive portion of the encounter.  I have seen and examined the patient; agree with the workup, evaluation, MDM, management and diagnosis.  The care plan has been discussed.      I personally saw the patient and made/approved the management plan and take responsibility for the patient management.    History: Briefly patient here for tender chest pain sharp and burning  Exam: Sitting comfortably, not tachycardic  MDM: I independently interpreted study(s) showing nonischemic EKG.  CT of the chest is negative for pulmonary embolism.  Troponins undetectable x 2.  Metabolic panel and CBC are unremarkable.  Patient is on max dose omeprazole.  Patient feels better here in the emergency department after Toradol.  Will discharge home with outpatient follow-up.    No indication for admission.  Discussed findings and diagnosis with the patient, follow-up and return to  ED precautions given, patient voiced understanding, agrees with plan, questions answered, patient was discharged in stable condition.   [JH]      ED Course User Index  [CT] DAMION Gonsales-CNP  [] Noe Hays MD         Diagnoses as of 06/17/25 2211   Chest pain, unspecified type           Medical Decision Making  Patient was seen and evaluated by the nurse practitioner, Cierra Stewart.  The patient is presenting to the emergency room with complaints of chest pain with left arm numbness.  Differential diagnosis includes ACS, arrhythmia, anxiety, GERD, dehydration, or other acute process.  Twelve-lead EKG was obtained and was negative for arrhythmia or ischemia.  Vital signs were stable.  The patient did not appear to be in any acute distress.  A saline lock was established.  Laboratory studies were drawn and results as noted.  Laboratory studies were largely unremarkable.  No acute leukocytosis or anemia.  No electrolyte derangements.  Patient has preserved renal and liver function.  CTA of the chest was performed and was negative for acute cardiopulmonary process.  The patient was notified of the imaging and laboratory results.  The patient is to follow-up with her primary care physician next 2 to 3 days.  She is to return if worse in any way.  The patient was discharged in stable condition with computer discharge instructions given.           Your medication list        ASK your doctor about these medications        Instructions Last Dose Given Next Dose Due   ARIPiprazole 20 mg tablet  Commonly known as: Abilify           bicalutamide 50 mg tablet  Commonly known as: Casodex      Take 1 tablet by mouth once daily       cetirizine 10 mg tablet  Commonly known as: ZyrTEC      Take 1 tablet (10 mg) by mouth once daily. Allergy medication       estradioL 1.25 mg/1.25 gram (0.1 %) gel in packet      Place 3.75 mg on the skin once daily. (3 packets)       hydrocortisone 2.5 % rectal cream  Commonly known as:  Anusol-HC      Insert into the rectum 2 times a day for 14 days.       hydrOXYzine pamoate 25 mg capsule  Commonly known as: Vistaril           lamoTRIgine 150 mg tablet  Commonly known as: LaMICtal           levETIRAcetam 750 mg tablet  Commonly known as: Keppra           multivitamin tablet           omeprazole 40 mg DR capsule  Commonly known as: PriLOSEC      TAKE 1 CAPSULE BY MOUTH TWICE A DAY BEFORE MEALS. DO NOT CRUSH OR CHEW       prazosin 1 mg capsule  Commonly known as: Minipress           progesterone 200 mg capsule  Commonly known as: Prometrium      Take 1 capsule (200 mg) by mouth once daily.       tiZANidine 2 mg tablet  Commonly known as: Zanaflex      TAKE 1 TABLET BY MOUTH TWICE A DAY AS NEEDED FOR MUSCLE SPASMS       traZODone 100 mg tablet  Commonly known as: Desyrel           zonisamide 100 mg capsule  Commonly known as: Zonegran                    Procedure       *This report was transcribed using voice recognition software.  Every effort was made to ensure accuracy; however, inadvertent computerized transcription errors may be present.*  Cierra BRUNO-[facility]  06/17/25           DAMION Gonsales-CNP  06/17/25 [id number]       [1]   Past Medical History:  Diagnosis Date    ADD (attention deficit disorder)     ADHD (attention deficit hyperactivity disorder)     Allergic     Anxiety     Autism ([facility])     Bipolar disorder     Chondromalacia patellae, right knee 10/24/2019    Chondromalacia of right patella    Chronic headaches     Chronic low back pain 04/13/2023    Chronic pain disorder     Depression     Dizziness     Dorsalgia, unspecified 06/24/2020    Acute back pain    Easy bruising     Flushing 01/06/2021    Flushing    Gender dysphoria     Generalized abdominal pain 11/02/2021    Abdominal pain, generalized    GERD (gastroesophageal reflux disease)     Headache     Hiatal hernia     HL (hearing loss)     Left ear only    Kidney stone 10/20(?)/24    Local infection of the skin and  subcutaneous tissue, unspecified 10/06/2020    Skin infection, bacterial    Nausea 03/10/2021    Nausea in adult    Other forms of dyspnea 03/08/2018    Dyspnea on exertion    Other hemorrhoids 07/15/2021    Internal hemorrhoids    Other skin changes 07/08/2021    Skin irritation    Otitis media, unspecified, left ear 05/01/2020    Acute otitis media, left    Personal history of COVID-19     Personal history of COVID-19    Personal history of diseases of the skin and subcutaneous tissue 03/10/2021    History of dermatitis    Personal history of other diseases of the respiratory system 10/06/2021    History of sore throat    Personal history of other diseases of the respiratory system 10/06/2021    History of laryngitis    Personal history of other specified conditions 08/17/2021    History of dysphagia    Personal history of other specified conditions 05/03/2018    History of palpitations    Personal history of other specified conditions 05/03/2018    History of exertional chest pain    Personal history of other specified conditions 05/03/2018    History of syncope    PONV (postoperative nausea and vomiting)     PTSD (post-traumatic stress disorder)     Seasonal asthma ([facility])     Seizure disorder ([facility])    [2]   Past Surgical History:  Procedure Laterality Date    ESOPHAGOGASTRODUODENOSCOPY  05/20/2025    INNER EAR SURGERY  03/08/2018    Inner Ear Surgery    OTHER SURGICAL HISTORY  04/05/2022    Oral surgery    OTHER SURGICAL HISTORY  08/06/2021    Esophagogastroduodenoscopy    OTHER SURGICAL HISTORY  08/06/2021    Colonoscopy    TONSILLECTOMY  03/08/2018    Tonsillectomy   [3]   Family History  Problem Relation Name Age of Onset    Other (cva) Other Kristian Terrell (Father)     Epilepsy Other Kristian Terrell (Father)     Ulcerative colitis Other Kristian Terrell (Father)     Hypertension Other Kristian Terrell (Father)     Stroke Mother Carissa Daly     Alcohol abuse Father Kristian Terrell     Alcohol abuse Brother Rolando Terrell    [4]   Social  History  Tobacco Use    Smoking status: Never     Passive exposure: Never    Smokeless tobacco: Never   Vaping Use    Vaping status: Never Used   Substance Use Topics    Alcohol use: Yes     Comment: very rare    Drug use: Yes     Types: Marijuana     Comment: DAMION Galdamez-LÁZARO  06/17/25 1799

## 2025-06-18 LAB
ATRIAL RATE: 64 BPM
P AXIS: 34 DEGREES
PR INTERVAL: 184 MS
Q ONSET: 252 MS
QRS COUNT: 11 BEATS
QRS DURATION: 96 MS
QT INTERVAL: 402 MS
QTC CALCULATION(BAZETT): 415 MS
QTC FREDERICIA: 410 MS
R AXIS: 40 DEGREES
T AXIS: 28 DEGREES
T OFFSET: 453 MS
VENTRICULAR RATE: 64 BPM

## 2025-06-24 DIAGNOSIS — Z01.818 PREOPERATIVE CLEARANCE: ICD-10-CM

## 2025-06-27 ENCOUNTER — APPOINTMENT (OUTPATIENT)
Dept: SURGERY | Facility: CLINIC | Age: 35
End: 2025-06-27
Payer: COMMERCIAL

## 2025-06-27 VITALS
BODY MASS INDEX: 36.39 KG/M2 | WEIGHT: 268.7 LBS | DIASTOLIC BLOOD PRESSURE: 83 MMHG | SYSTOLIC BLOOD PRESSURE: 125 MMHG | OXYGEN SATURATION: 95 % | HEIGHT: 72 IN | HEART RATE: 67 BPM

## 2025-06-27 DIAGNOSIS — L72.0 EPIDERMAL INCLUSION CYST: Primary | ICD-10-CM

## 2025-06-27 PROCEDURE — 11402 EXC TR-EXT B9+MARG 1.1-2 CM: CPT | Performed by: SURGERY

## 2025-06-27 ASSESSMENT — ENCOUNTER SYMPTOMS
SHORTNESS OF BREATH: 0
CHILLS: 0
DIZZINESS: 0
PALPITATIONS: 0
COUGH: 0
HEADACHES: 0
FEVER: 0

## 2025-06-27 NOTE — PROGRESS NOTES
GENERAL SURGERY CLINIC NOTE    Jennifer Terrell   1990   [id number]     History Of Present Illness  Jennifer Terrell is a 34 y.o. adult who presents to the office for excision of a cyst on her upper back. She has had this for years and it has gotten more painful recently. Removal was attempted a few years ago unsuccessfully. It has not been infected.    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     Past Medical History  She has a past medical history of ADD (attention deficit disorder), ADHD (attention deficit hyperactivity disorder), Allergic, Anxiety, Autism ([facility]), Bipolar disorder, Chondromalacia patellae, right knee (10/24/2019), Chronic headaches, Chronic low back pain (04/13/2023), Chronic pain disorder, Depression, Dizziness, Dorsalgia, unspecified (06/24/2020), Easy bruising, Flushing (01/06/2021), Gender dysphoria, Generalized abdominal pain (11/02/2021), GERD (gastroesophageal reflux disease), GI (gastrointestinal bleed) (2020), Headache, Hiatal hernia, History of adverse reaction to anesthesia (1995), HL (hearing loss), Kidney stone (10/20(?)/24), Local infection of the skin and subcutaneous tissue, unspecified (10/06/2020), Nausea (03/10/2021), Other forms of dyspnea (03/08/2018), Other hemorrhoids (07/15/2021), Other skin changes (07/08/2021), Otitis media, unspecified, left ear (05/01/2020), Personal history of COVID-19, Personal history of diseases of the skin and subcutaneous tissue (03/10/2021), Personal history of other diseases of the respiratory system (10/06/2021), Personal history of other diseases of the respiratory system (10/06/2021), Personal history of other specified conditions (08/17/2021), Personal history of other specified conditions (05/03/2018), Personal history of other specified conditions (05/03/2018), Personal history of other specified conditions (05/03/2018), PONV (postoperative nausea and vomiting), PTSD (post-traumatic stress disorder), Seasonal asthma  ([facility]), Seizure disorder (Multi), and Syncope.    Surgical History  She has a past surgical history that includes Inner ear surgery (03/08/2018); Tonsillectomy (03/08/2018); Other surgical history (04/05/2022); Other surgical history (08/06/2021); Other surgical history (08/06/2021); Esophagogastroduodenoscopy (05/20/2025); Colonoscopy; and Upper gastrointestinal endoscopy.    Medications  Medications Ordered Prior to Encounter[1]    Allergies  Cinnamon; Penicillin v potassium; Penicillins; Petrolatum,white; and Petroleum jelly [white petrolatum]     Social History  She reports that she has never smoked. She has never been exposed to tobacco smoke. She has never used smokeless tobacco. She reports current alcohol use. She reports current drug use. Frequency: 0.50 times per week. Drug: Marijuana.    Family History  Family History[2]     Review of Systems   Constitutional:  Negative for chills and fever.   Respiratory:  Negative for cough and shortness of breath.    Cardiovascular:  Negative for chest pain and palpitations.   Neurological:  Negative for dizziness and headaches.   All other systems reviewed and are negative.      Last Recorded Vitals  Blood pressure 125/83, pulse 67, height 1.829 m (6'), weight 122 kg (268 lb 11.2 oz), SpO2 95%.     Physical Exam  Constitutional:       General: She is not in acute distress.     Appearance: Normal appearance. She is not ill-appearing.   HENT:      Head: Normocephalic and atraumatic.   Pulmonary:      Effort: Pulmonary effort is normal. No respiratory distress.   Skin:     General: Skin is warm and dry.      Comments: L upper back just below the neck: there is a 1x1cm mass consistent with a noninfected cyst   Neurological:      Mental Status: She is alert and oriented to person, place, and time. Mental status is at baseline.   Psychiatric:         Mood and Affect: Mood normal.         Behavior: Behavior normal.          Relevant Results  None     Procedure Details  A  time out was performed. The patient was placed in the right lateral decubitus position and all pressure points were padded. The left upper back was prepped and draped in the usual sterile fashion.     Local anesthesia was given using 2cc of 1% lidocaine. A transverse incision was made over the cyst. Sharp dissection was used to carefully dissect around the mass. It appeared consistent with a noninfected cyst. It was removed and measured 1.3x1x0.8cm in size. Hemostasis was achieved with pressure. The incision was closed with 3-0 Vicryl in an interrupted fashion. Benzoin, steristrips and a mild pressure dressing were placed. The patient tolerated the procedure well.     Assessment and Plan  34 y.o. adult with a cyst on her left upper back status post excision in the office today (6/27/25). Follow up in 7-10 days to check the incision.     Shante Darling MD, [facility]  General Surgery          [1]   Current Outpatient Medications on File Prior to Visit   Medication Sig Dispense Refill    ARIPiprazole (Abilify) 20 mg tablet Take 1 tablet (20 mg) by mouth once daily.      bicalutamide (Casodex) 50 mg tablet Take 1 tablet by mouth once daily 90 tablet 1    cetirizine (ZyrTEC) 10 mg tablet Take 1 tablet (10 mg) by mouth once daily. Allergy medication 90 tablet 3    estradioL 1.25 mg/1.25 gram (0.1 %) gel in packet Place 3.75 mg on the skin once daily. (3 packets) 112.5 g 11    hydrOXYzine pamoate (Vistaril) 25 mg capsule Take 1 capsule (25 mg) by mouth every 12 hours.      lamoTRIgine (LaMICtal) 150 mg tablet Take 1 tablet (150 mg) by mouth 2 times a day.      levETIRAcetam (Keppra) 750 mg tablet Take 2 tablets (1,500 mg) by mouth every 12 hours.      multivitamin tablet Take 1 tablet by mouth once daily.      omeprazole (PriLOSEC) 40 mg DR capsule TAKE 1 CAPSULE BY MOUTH TWICE A DAY BEFORE MEALS. DO NOT CRUSH OR CHEW 180 capsule 1    prazosin (Minipress) 1 mg capsule Take 1 capsule (1 mg) by mouth once daily at bedtime. For  nightmares      progesterone (Prometrium) 200 mg capsule Take 1 capsule (200 mg) by mouth once daily. 90 capsule 3    tiZANidine (Zanaflex) 2 mg tablet TAKE 1 TABLET BY MOUTH TWICE A DAY AS NEEDED FOR MUSCLE SPASMS 60 tablet 1    traZODone (Desyrel) 100 mg tablet Take 1 tablet (100 mg) by mouth once daily at bedtime.      zonisamide (Zonegran) 100 mg capsule Take 2 capsules (200 mg) by mouth once daily at bedtime.      hydrocortisone (Anusol-HC) 2.5 % rectal cream Insert into the rectum 2 times a day for 14 days. 30 g 0     No current facility-administered medications on file prior to visit.   [2]   Family History  Problem Relation Name Age of Onset    Other (cva) Other Kristian Terrell (Father)     Epilepsy Other Kristian Terrell (Father)     Ulcerative colitis Other Kristian Terrell (Father)     Hypertension Other Kristian Terrell (Father)     Stroke Mother Carissa Kauffman     Alcohol abuse Father Kristian Terrell     Alcohol abuse Brother Rolando Elk     Asthma Mother Carissa Kauffman 10 - 19

## 2025-06-30 ENCOUNTER — APPOINTMENT (OUTPATIENT)
Dept: PREADMISSION TESTING | Facility: HOSPITAL | Age: 35
End: 2025-06-30
Payer: COMMERCIAL

## 2025-06-30 ENCOUNTER — APPOINTMENT (OUTPATIENT)
Dept: UROLOGY | Facility: CLINIC | Age: 35
End: 2025-06-30
Payer: COMMERCIAL

## 2025-06-30 VITALS
BODY MASS INDEX: 36.35 KG/M2 | TEMPERATURE: 97.4 F | DIASTOLIC BLOOD PRESSURE: 72 MMHG | HEART RATE: 79 BPM | SYSTOLIC BLOOD PRESSURE: 119 MMHG | HEIGHT: 72 IN | WEIGHT: 268.4 LBS

## 2025-06-30 DIAGNOSIS — F64.9 GENDER DYSPHORIA: Primary | ICD-10-CM

## 2025-06-30 PROCEDURE — 3008F BODY MASS INDEX DOCD: CPT | Performed by: NURSE PRACTITIONER

## 2025-06-30 PROCEDURE — 99215 OFFICE O/P EST HI 40 MIN: CPT | Performed by: NURSE PRACTITIONER

## 2025-06-30 NOTE — PROGRESS NOTES
GENDER CARE PRE-OP TEACHING    PROBLEM LIST:  Problem List[1]     HISTORY OF PRESENT ILLNESS:  Jennifer Terrell is a 34 y.o. trans woman scheduled for gender-affirming vaginoplasty 8/19/25  Pronouns are she/her    INTERVAL HISTORY:  Returns today for pre-op teaching  Seen accompanied by friend, Michaelle  Demonstrates good understanding of upcoming surgery  No issues with pain medications previously  Roommate will provide some help, partner lives in [place]  Has a therapist and a psychiatry provider  Got labs done earlier today  No hair removal     PAST MEDICAL HISTORY:  Medical History[2]    PAST SURGICAL HISTORY:  Surgical History[3]     ALLERGIES:   Allergies[4]     MEDICATIONS:   Medications Ordered Prior to Encounter[5]     SOCIAL HISTORY:  Patient  reports that she has never smoked. She has never been exposed to tobacco smoke. She has never used smokeless tobacco. She reports current alcohol use. She reports current drug use. Frequency: 0.50 times per week. Drug: Marijuana.   Social History     Socioeconomic History    Marital status: Single     Spouse name: Not on file    Number of children: Not on file    Years of education: Not on file    Highest education level: Not on file   Occupational History    Not on file   Tobacco Use    Smoking status: Never     Passive exposure: Never    Smokeless tobacco: Never   Vaping Use    Vaping status: Never Used   Substance and Sexual Activity    Alcohol use: Yes     Comment: very rare    Drug use: Yes     Frequency: 0.5 times per week     Types: Marijuana     Comment: ocassional edibles    Sexual activity: Yes     Partners: Male, Nonbinary, Transgender Male / Female-to-Male, Gender-Diverse     Birth control/protection: Condom Female   Other Topics Concern    Not on file   Social History Narrative    Not on file     Social Drivers of Health     Financial Resource Strain: High Risk (5/4/2023)    Received from [facility]    Overall Financial Resource Strain (CARDIA)      Difficulty of Paying Living Expenses: Very hard   Food Insecurity: No Food Insecurity (6/2/2025)    Hunger Vital Sign     Worried About Running Out of Food in the Last Year: Never true     Ran Out of Food in the Last Year: Never true   Transportation Needs: Unmet Transportation Needs (5/4/2023)    Received from [facility]    PRAPARE - Transportation     Lack of Transportation (Medical): Yes     Lack of Transportation (Non-Medical): Yes   Physical Activity: Insufficiently Active (11/15/2022)    Received from [facility]    Exercise Vital Sign     Days of Exercise per Week: 2 days     Minutes of Exercise per Session: 30 min   Stress: No Stress Concern Present (11/15/2022)    Received from [facility]    [place] citizen [place] of Occupational Health - Occupational Stress Questionnaire     Feeling of Stress : Not at all   Social Connections: Socially Isolated (11/15/2022)    Received from [facility]    Social Connection and Isolation Panel [NHANES]     Frequency of Communication with Friends and Family: Never     Frequency of Social Gatherings with Friends and Family: Never     Attends Hinduism Services: Never     Active Member of Clubs or Organizations: No     Attends Club or Organization Meetings: Never     Marital Status: Living with partner   Intimate Partner Violence: Not At Risk (11/15/2022)    Received from [facility]    Humiliation, Afraid, Rape, and Kick questionnaire     Fear of Current or Ex-Partner: No     Emotionally Abused: No     Physically Abused: No     Sexually Abused: No   Housing Stability: Low Risk  (11/21/2022)    Received from [facility]    Housing Stability Vital Sign     Unable to Pay for Housing in the Last Year: No     Number of Places Lived in the Last Year: 1     Unstable Housing in the Last Year: No       FAMILY HISTORY:  Family History[6]    REVIEW OF SYSTEMS:  All systems reviewed, pertinent negatives as noted in HPI.     PHYSICAL EXAM:  Visit Vitals  /72   Pulse 79   Temp  36.3 °C (97.4 °F) (Temporal)     Constitutional: Well-developed. No acute distress.    Head: Atraumatic. Mucus membranes moist.  Neck: Normal range of motion.    Pulmonary/Chest: Effort normal. No respiratory distress.   Abdominal: Nondistended.  : Deferred.  Integumentary: No rash or lesions.  Musculoskeletal: Normal range of motion.    Neurological: Alert and oriented.  Psychiatric: Normal mood and affect. Thought content normal.      LABORATORY REVIEW:   [unfilled]    Lab Results   Component Value Date    BUN 11 06/17/2025    CREATININE 1.06 06/17/2025    EGFR 70 06/17/2025     06/17/2025    K 3.8 06/17/2025     06/17/2025    CO2 24 06/17/2025    CALCIUM 9.1 06/17/2025      Lab Results   Component Value Date    WBC 7.6 06/17/2025    RBC 4.67 06/17/2025    HGB 14.0 06/17/2025    HCT 41.0 06/17/2025    MCV 88 06/17/2025    MCH 30.0 06/17/2025    MCHC 34.1 06/17/2025    RDW 12.7 06/17/2025     06/17/2025    MPV 10.7 10/24/2023           Assessment:      1. Gender dysphoria            Jennifer Terrell is a 34 y.o. trans woman scheduled for gender-affirming PPT vaginoplasty 8/19/25  Discussed weight, currently at upper limit; avoid gaining weight between now & surgery    Social support: Yes   Hair removal: No    Smoker: No    Diabetic: No   Lab Results   Component Value Date    HGBA1C 4.9 09/30/2024     Body mass index is 36.4 kg/m².       Plan:   Extensive review of expectations for perioperative period, including pre-op prep and hospital stay  Discussed anticipated pain levels and time to recovery  Emphasized importance of adherence to dilation instructions  Affirmed importance of ongoing contact with mental health provider  Provided written information   Plan on 2 week post-op visit  Pelvic floor PT at 4 weeks post-op  Encouraged to call in interim with questions, concerns             [1]   Patient Active Problem List  Diagnosis    Dysphagia    Facial myokymia    GERD (gastroesophageal reflux  disease)    Hiatal hernia    Hemorrhoids    Lipoma of abdominal wall    Chronic low back pain    Partial symptomatic epilepsy with complex partial seizures, not intractable, without status epilepticus    Bipolar depression (Multi)    Environmental and seasonal allergies    TIA (transient ischemic attack)    Obesity, Class II, BMI 35-39.9    Deaf, left    Lumbar radicular pain    Gender dysphoria in adult    Gender dysphoria    Hematemesis with nausea   [2]   Past Medical History:  Diagnosis Date    ADD (attention deficit disorder)     ADHD (attention deficit hyperactivity disorder)     Allergic     Anxiety     Autism ([facility])     Bipolar disorder     Chondromalacia patellae, right knee 10/24/2019    Chondromalacia of right patella    Chronic headaches     Chronic low back pain 04/13/2023    Chronic pain disorder     Depression     Dizziness     Dorsalgia, unspecified 06/24/2020    Acute back pain    Easy bruising     Flushing 01/06/2021    Flushing    Gender dysphoria     Generalized abdominal pain 11/02/2021    Abdominal pain, generalized    GERD (gastroesophageal reflux disease)     GI (gastrointestinal bleed) 2020    Headache     Hiatal hernia     History of adverse reaction to anesthesia 1995    Novocaine has no effect on me.    HL (hearing loss)     Left ear only    Kidney stone 10/20(?)/24    Local infection of the skin and subcutaneous tissue, unspecified 10/06/2020    Skin infection, bacterial    Nausea 03/10/2021    Nausea in adult    Other forms of dyspnea 03/08/2018    Dyspnea on exertion    Other hemorrhoids 07/15/2021    Internal hemorrhoids    Other skin changes 07/08/2021    Skin irritation    Otitis media, unspecified, left ear 05/01/2020    Acute otitis media, left    Personal history of COVID-19     Personal history of COVID-19    Personal history of diseases of the skin and subcutaneous tissue 03/10/2021    History of dermatitis    Personal history of other diseases of the respiratory system  10/06/2021    History of sore throat    Personal history of other diseases of the respiratory system 10/06/2021    History of laryngitis    Personal history of other specified conditions 08/17/2021    History of dysphagia    Personal history of other specified conditions 05/03/2018    History of palpitations    Personal history of other specified conditions 05/03/2018    History of exertional chest pain    Personal history of other specified conditions 05/03/2018    History of syncope    PONV (postoperative nausea and vomiting)     PTSD (post-traumatic stress disorder)     Seasonal asthma ([facility])     Seizure disorder (Multi)     Syncope    [3]   Past Surgical History:  Procedure Laterality Date    COLONOSCOPY      ESOPHAGOGASTRODUODENOSCOPY  05/20/2025    INNER EAR SURGERY  03/08/2018    Inner Ear Surgery    OTHER SURGICAL HISTORY  04/05/2022    Oral surgery    OTHER SURGICAL HISTORY  08/06/2021    Esophagogastroduodenoscopy    OTHER SURGICAL HISTORY  08/06/2021    Colonoscopy    TONSILLECTOMY  03/08/2018    Tonsillectomy    UPPER GASTROINTESTINAL ENDOSCOPY     [4]   Allergies  Allergen Reactions    Cinnamon Anaphylaxis     Artificial cinnamon flavored products    Penicillin V Potassium Anaphylaxis    Penicillins Anaphylaxis, Swelling, Other and Unknown     throat swelling, SOB    Petrolatum,White Rash    Petroleum Jelly [White Petrolatum] Hives     Cold, burning sensation and welts   [5]   Current Outpatient Medications on File Prior to Visit   Medication Sig Dispense Refill    ARIPiprazole (Abilify) 20 mg tablet Take 1 tablet (20 mg) by mouth once daily.      bicalutamide (Casodex) 50 mg tablet Take 1 tablet by mouth once daily 90 tablet 1    cetirizine (ZyrTEC) 10 mg tablet Take 1 tablet (10 mg) by mouth once daily. Allergy medication 90 tablet 3    estradioL 1.25 mg/1.25 gram (0.1 %) gel in packet Place 3.75 mg on the skin once daily. (3 packets) 112.5 g 11    hydrOXYzine pamoate (Vistaril) 25 mg capsule Take  1 capsule (25 mg) by mouth every 12 hours.      lamoTRIgine (LaMICtal) 150 mg tablet Take 1 tablet (150 mg) by mouth 2 times a day.      levETIRAcetam (Keppra) 750 mg tablet Take 2 tablets (1,500 mg) by mouth every 12 hours.      multivitamin tablet Take 1 tablet by mouth once daily.      omeprazole (PriLOSEC) 40 mg DR capsule TAKE 1 CAPSULE BY MOUTH TWICE A DAY BEFORE MEALS. DO NOT CRUSH OR CHEW 180 capsule 1    prazosin (Minipress) 1 mg capsule Take 1 capsule (1 mg) by mouth once daily at bedtime. For nightmares      progesterone (Prometrium) 200 mg capsule Take 1 capsule (200 mg) by mouth once daily. 90 capsule 3    tiZANidine (Zanaflex) 2 mg tablet TAKE 1 TABLET BY MOUTH TWICE A DAY AS NEEDED FOR MUSCLE SPASMS 60 tablet 1    traZODone (Desyrel) 100 mg tablet Take 1 tablet (100 mg) by mouth once daily at bedtime.      zonisamide (Zonegran) 100 mg capsule Take 2 capsules (200 mg) by mouth once daily at bedtime.      hydrocortisone (Anusol-HC) 2.5 % rectal cream Insert into the rectum 2 times a day for 14 days. 30 g 0     No current facility-administered medications on file prior to visit.   [6]   Family History  Problem Relation Name Age of Onset    Other (cva) Other Kristian Terrell (Father)     Epilepsy Other Kristian Terrell (Father)     Ulcerative colitis Other Kristian Terrell (Father)     Hypertension Other Kristian Terrell (Father)     Stroke Mother Carissa Kauffman     Alcohol abuse Father Kristian Terrell     Alcohol abuse Brother Rolando Terrell     Asthma Mother Carissa Kauffman 10 - 19

## 2025-07-01 LAB
ANION GAP SERPL CALCULATED.4IONS-SCNC: 11 MMOL/L (CALC) (ref 7–17)
BACTERIA #/AREA URNS HPF: ABNORMAL /HPF
BACTERIA UR CULT: ABNORMAL
BUN SERPL-MCNC: 14 MG/DL (ref 7–25)
BUN/CREAT SERPL: ABNORMAL (CALC) (ref 6–22)
CALCIUM SERPL-MCNC: 8.9 MG/DL (ref 8.6–10.3)
CHLORIDE SERPL-SCNC: 108 MMOL/L (ref 98–110)
CO2 SERPL-SCNC: 21 MMOL/L (ref 20–32)
CREAT SERPL-MCNC: 1.05 MG/DL (ref 0.6–1.26)
EGFRCR SERPLBLD CKD-EPI 2021: 95 ML/MIN/1.73M2
ERYTHROCYTE [DISTWIDTH] IN BLOOD BY AUTOMATED COUNT: 12.9 % (ref 11–15)
EST. AVERAGE GLUCOSE BLD GHB EST-MCNC: 94 MG/DL
EST. AVERAGE GLUCOSE BLD GHB EST-SCNC: 5.2 MMOL/L
GLUCOSE SERPL-MCNC: 56 MG/DL (ref 65–99)
HBA1C MFR BLD: 4.9 %
HCT VFR BLD AUTO: 40.6 % (ref 38.5–50)
HGB BLD-MCNC: 13.6 G/DL (ref 13.2–17.1)
HYALINE CASTS #/AREA URNS LPF: ABNORMAL /LPF
MCH RBC QN AUTO: 31.1 PG (ref 27–33)
MCHC RBC AUTO-ENTMCNC: 33.5 G/DL (ref 32–36)
MCV RBC AUTO: 92.9 FL (ref 80–100)
PLATELET # BLD AUTO: 200 THOUSAND/UL (ref 140–400)
PMV BLD REES-ECKER: 10.2 FL (ref 7.5–12.5)
POTASSIUM SERPL-SCNC: 3.6 MMOL/L (ref 3.5–5.3)
RBC # BLD AUTO: 4.37 MILLION/UL (ref 4.2–5.8)
RBC #/AREA URNS HPF: ABNORMAL /HPF
SERVICE CMNT-IMP: ABNORMAL
SODIUM SERPL-SCNC: 140 MMOL/L (ref 135–146)
SQUAMOUS #/AREA URNS HPF: ABNORMAL /HPF
WBC # BLD AUTO: 6.9 THOUSAND/UL (ref 3.8–10.8)
WBC #/AREA URNS HPF: ABNORMAL /HPF

## 2025-07-03 ENCOUNTER — PATIENT MESSAGE (OUTPATIENT)
Dept: PRIMARY CARE | Facility: CLINIC | Age: 35
End: 2025-07-03
Payer: COMMERCIAL

## 2025-07-03 DIAGNOSIS — F64.0 GENDER DYSPHORIA IN ADULT: Primary | ICD-10-CM

## 2025-07-03 LAB
ANION GAP SERPL CALCULATED.4IONS-SCNC: 11 MMOL/L (CALC) (ref 7–17)
BUN SERPL-MCNC: 14 MG/DL (ref 7–25)
BUN/CREAT SERPL: ABNORMAL (CALC) (ref 6–22)
CALCIUM SERPL-MCNC: 9.1 MG/DL (ref 8.6–10.3)
CHLORIDE SERPL-SCNC: 108 MMOL/L (ref 98–110)
CO2 SERPL-SCNC: 21 MMOL/L (ref 20–32)
CREAT SERPL-MCNC: 1.03 MG/DL (ref 0.6–1.26)
EGFRCR SERPLBLD CKD-EPI 2021: 97 ML/MIN/1.73M2
ESTRADIOL SERPL-MCNC: 85 PG/ML
GLUCOSE SERPL-MCNC: 56 MG/DL (ref 65–99)
POTASSIUM SERPL-SCNC: 3.6 MMOL/L (ref 3.5–5.3)
SODIUM SERPL-SCNC: 140 MMOL/L (ref 135–146)
TESTOST FREE SERPL-MCNC: 24.1 PG/ML (ref 35–155)
TESTOST SERPL-MCNC: 130 NG/DL (ref 250–1100)

## 2025-07-04 LAB
ANABASINE UR-MCNC: <2 NG/ML
COTININE UR-MCNC: <2 NG/ML
NICOTINE UR-MCNC: <2 NG/ML
NORCOTININE UR-MCNC: <2 NG/ML
QUEST 3-OH-COTININE, URINE: <2 NG/ML
QUEST NORNICOTINE,UR: <2 NG/ML

## 2025-07-06 ENCOUNTER — PATIENT MESSAGE (OUTPATIENT)
Dept: PRIMARY CARE | Facility: CLINIC | Age: 35
End: 2025-07-06
Payer: COMMERCIAL

## 2025-07-06 DIAGNOSIS — E66.812 OBESITY, CLASS II, BMI 35-39.9: Primary | ICD-10-CM

## 2025-07-06 DIAGNOSIS — G89.29 CHRONIC LEFT-SIDED LOW BACK PAIN WITH LEFT-SIDED SCIATICA: ICD-10-CM

## 2025-07-06 DIAGNOSIS — M54.42 CHRONIC LEFT-SIDED LOW BACK PAIN WITH LEFT-SIDED SCIATICA: ICD-10-CM

## 2025-07-07 ENCOUNTER — TREATMENT (OUTPATIENT)
Dept: PHYSICAL THERAPY | Facility: CLINIC | Age: 35
End: 2025-07-07
Payer: COMMERCIAL

## 2025-07-07 DIAGNOSIS — M62.89 PELVIC FLOOR TENSION: ICD-10-CM

## 2025-07-07 PROCEDURE — 97535 SELF CARE MNGMENT TRAINING: CPT | Mod: GP

## 2025-07-07 RX ORDER — TIZANIDINE 2 MG/1
TABLET ORAL
Qty: 180 TABLET | Refills: 1 | Status: SHIPPED | OUTPATIENT
Start: 2025-07-07

## 2025-07-07 NOTE — PROGRESS NOTES
PHYSICAL THERAPY   TREATMENT NOTE    Patient Name:  Jennifer Terrell   Patient MRN: [id number]  Date: 07/07/25    Time Calculation  Start Time: 0820  Stop Time: 0901  Time Calculation (min): 41 min    Insurance:  Visit number: 4  Insurance Type: [employer]   Approved # of visits: 24  Authorization Needed: yes  Cert Date Ends On: 4-25-2, [place] 16607 ([id number], [id number] APPROVED)     General:  Reason for visit: gender dysphoria; preop strengthening   Referred by: Nestor Orellana diagnoses:   1. Pelvic floor tension  Follow Up In Physical Therapy           Assessment:    No pelvic floor impairments at this time. She is 6 weeks out from surgery and with increased worries regarding healing, timeline, expectations. She has a good support system but has to return to work very quickly because her job is not protected. She has questions regarding dilation, vibration, recovery.   Pain levels were unchanged at the end of the treatment.    Plan:  1) Will see 4 weeks post operative   2) Pt to email with any abnormal bowel/bladder concerns if they arise prior to 4 weeks  3) Pt to bring dilators and douching in     Subjective:  Pt with compliance to exercises   No bowel or bladder concerns at the moment   Concerned about job status post surgery, financial costs associated with surgery   Surgery 8/19/25  Pain (0-10): 0/10 Lumbar spine     Precautions:  PMH: gender dysphoria, L deafness, obesity, dysphagia, GERD, hiatal hernia, hemorrhoids, bipolad depression, chronic low back pain, epilepsy, facial myokymia, TIA  Fall Risk: yes     Objective:    Treatment Performed:   Therapeutic Exercise:    minutes    Therapeutic Activity:   minutes     Self Care: 41 minutes  Discussed post op dilation, bowel and bladder dysfunction, supply list post surgery, expectations for recovery, post op movement, deep breathing, empathetic listening provided for pre op worries and anxiety, support system  Manual Therapy:    minutes    Neuromuscular Re-education:   minutes    Gait Training:    minutes    Modalities:    minutes    Dry Needling:   minutes

## 2025-07-09 ENCOUNTER — APPOINTMENT (OUTPATIENT)
Dept: SURGERY | Facility: CLINIC | Age: 35
End: 2025-07-09
Payer: COMMERCIAL

## 2025-07-09 VITALS
HEIGHT: 72 IN | BODY MASS INDEX: 36.3 KG/M2 | HEART RATE: 62 BPM | WEIGHT: 268 LBS | OXYGEN SATURATION: 96 % | DIASTOLIC BLOOD PRESSURE: 76 MMHG | SYSTOLIC BLOOD PRESSURE: 115 MMHG

## 2025-07-09 DIAGNOSIS — L72.0 EPIDERMAL INCLUSION CYST: Primary | ICD-10-CM

## 2025-07-09 PROCEDURE — 3008F BODY MASS INDEX DOCD: CPT | Performed by: SURGERY

## 2025-07-09 PROCEDURE — 99024 POSTOP FOLLOW-UP VISIT: CPT | Performed by: SURGERY

## 2025-07-09 PROCEDURE — 1036F TOBACCO NON-USER: CPT | Performed by: SURGERY

## 2025-07-09 ASSESSMENT — ENCOUNTER SYMPTOMS
FEVER: 0
SHORTNESS OF BREATH: 0
HEADACHES: 0

## 2025-07-09 NOTE — PROGRESS NOTES
GENERAL SURGERY CLINIC NOTE    Jennifer Terrell   1990   [id number]     History Of Present Illness  Jennifer Terrell is a 34 y.o. adult who presents to the office for after undergoing excision of a cyst on her upper back 6/27/25. She is healing well and has no concerns today.    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), Chronic headaches, Chronic low back pain (04/13/2023), Chronic pain disorder, Depression, Dizziness, Dorsalgia, unspecified (06/24/2020), Easy bruising, Flushing (01/06/2021), Gender dysphoria, Generalized abdominal pain (11/02/2021), GERD (gastroesophageal reflux disease), GI (gastrointestinal bleed) (2020), Headache, Hiatal hernia, History of adverse reaction to anesthesia (1995), HL (hearing loss), Kidney stone (10/20(?)/24), Local infection of the skin and subcutaneous tissue, unspecified (10/06/2020), Nausea (03/10/2021), Other forms of dyspnea (03/08/2018), Other hemorrhoids (07/15/2021), Other skin changes (07/08/2021), Otitis media, unspecified, left ear (05/01/2020), Personal history of COVID-19, Personal history of diseases of the skin and subcutaneous tissue (03/10/2021), Personal history of other diseases of the respiratory system (10/06/2021), Personal history of other diseases of the respiratory system (10/06/2021), Personal history of other specified conditions (08/17/2021), Personal history of other specified conditions (05/03/2018), Personal history of other specified conditions (05/03/2018), Personal history of other specified conditions (05/03/2018), PONV (postoperative nausea and vomiting), PTSD (post-traumatic stress disorder), Seasonal asthma ([facility]), Seizure disorder (Multi), and Syncope.    Surgical History  She has a  past surgical history that includes Inner ear surgery (03/08/2018); Tonsillectomy (03/08/2018); Other surgical history (04/05/2022); Other surgical history (08/06/2021); Other surgical history (08/06/2021); Esophagogastroduodenoscopy (05/20/2025); Colonoscopy; and Upper gastrointestinal endoscopy.    Medications  Medications Ordered Prior to Encounter[1]    Allergies  Cinnamon; Penicillin v potassium; Penicillins; Petrolatum,white; and Petroleum jelly [white petrolatum]     Social History  She reports that she has never smoked. She has never been exposed to tobacco smoke. She has never used smokeless tobacco. She reports current alcohol use. She reports current drug use. Frequency: 0.50 times per week. Drug: Marijuana.    Family History  Family History[2]     Review of Systems   Constitutional:  Negative for fever.   Respiratory:  Negative for shortness of breath.    Cardiovascular:  Negative for chest pain.   Skin:  Negative for rash.   Neurological:  Negative for headaches.   All other systems reviewed and are negative.      Last Recorded Vitals  Blood pressure 115/76, pulse 62, height 1.829 m (6'), weight 122 kg (268 lb), SpO2 96%.     Physical Exam  Constitutional:       General: She is not in acute distress.     Appearance: Normal appearance. She is not ill-appearing.   HENT:      Head: Normocephalic and atraumatic.   Pulmonary:      Effort: Pulmonary effort is normal. No respiratory distress.   Skin:     General: Skin is warm and dry.      Comments: L upper back just below the neck: steristrips removed. The incision is healing well with no evidence of infection.   Neurological:      Mental Status: She is alert and oriented to person, place, and time. Mental status is at baseline.   Psychiatric:         Mood and Affect: Mood normal.         Behavior: Behavior normal.          Relevant Results    Surgical pathology 6/27/25: pending    Assessment and Plan  34 y.o. adult with a cyst on her left upper back status  post excision in the office 6/27/25. She is healing well post procedure. She will receive a phone call once the pathology results have been finalized. Follow up on an as needed basis. She expressed her understanding and all questions were answered.     Shante Darling MD, [facility]  General Surgery            [1]   Current Outpatient Medications on File Prior to Visit   Medication Sig Dispense Refill    ARIPiprazole (Abilify) 20 mg tablet Take 1 tablet (20 mg) by mouth once daily.      bicalutamide (Casodex) 50 mg tablet Take 1 tablet by mouth once daily 90 tablet 1    cetirizine (ZyrTEC) 10 mg tablet Take 1 tablet (10 mg) by mouth once daily. Allergy medication 90 tablet 3    estradioL 1.25 mg/1.25 gram (0.1 %) gel in packet Place 3.75 mg on the skin once daily. (3 packets) 112.5 g 11    hydrocortisone (Anusol-HC) 2.5 % rectal cream Insert into the rectum 2 times a day for 14 days. 30 g 0    hydrOXYzine pamoate (Vistaril) 25 mg capsule Take 1 capsule (25 mg) by mouth every 12 hours.      lamoTRIgine (LaMICtal) 150 mg tablet Take 1 tablet (150 mg) by mouth 2 times a day.      levETIRAcetam (Keppra) 750 mg tablet Take 2 tablets (1,500 mg) by mouth every 12 hours.      multivitamin tablet Take 1 tablet by mouth once daily.      omeprazole (PriLOSEC) 40 mg DR capsule TAKE 1 CAPSULE BY MOUTH TWICE A DAY BEFORE MEALS. DO NOT CRUSH OR CHEW 180 capsule 1    prazosin (Minipress) 1 mg capsule Take 1 capsule (1 mg) by mouth once daily at bedtime. For nightmares      progesterone (Prometrium) 200 mg capsule Take 1 capsule (200 mg) by mouth once daily. 90 capsule 3    tiZANidine (Zanaflex) 2 mg tablet TAKE 1 TABLET BY MOUTH TWICE A DAY AS NEEDED FOR MUSCLE SPASMS 180 tablet 1    traZODone (Desyrel) 100 mg tablet Take 1 tablet (100 mg) by mouth once daily at bedtime.      zonisamide (Zonegran) 100 mg capsule Take 2 capsules (200 mg) by mouth once daily at bedtime.      [DISCONTINUED] tiZANidine (Zanaflex) 2 mg tablet TAKE 1 TABLET BY  MOUTH TWICE A DAY AS NEEDED FOR MUSCLE SPASMS 60 tablet 1     No current facility-administered medications on file prior to visit.   [2]   Family History  Problem Relation Name Age of Onset    Other (cva) Other Kristian Terrell (Father)     Epilepsy Other Kristian Terrell (Father)     Ulcerative colitis Other Kristian Terrell (Father)     Hypertension Other Kristian Terrell (Father)     Stroke Mother Carissakhadra Kauffman     Alcohol abuse Father Kristian Terrell     Alcohol abuse Brother Rolandomindy Terrell     Asthma Mother Carissakhadra Kauffman 10 - 19

## 2025-07-14 ENCOUNTER — TELEPHONE (OUTPATIENT)
Dept: SURGERY | Facility: CLINIC | Age: 35
End: 2025-07-14
Payer: COMMERCIAL

## 2025-07-14 NOTE — TELEPHONE ENCOUNTER
----- Message from Shante Darling sent at 7/14/2025  9:16 AM EDT -----  Can you let the patient know the pathology results? There was typical cyst tissue - nothing unexpected.

## 2025-07-17 DIAGNOSIS — F64.0 GENDER DYSPHORIA IN ADULT: ICD-10-CM

## 2025-07-18 LAB
ATRIAL RATE: 64 BPM
ESTRADIOL SERPL-MCNC: 153 PG/ML
P AXIS: 34 DEGREES
PR INTERVAL: 184 MS
Q ONSET: 252 MS
QRS COUNT: 11 BEATS
QRS DURATION: 96 MS
QT INTERVAL: 402 MS
QTC CALCULATION(BAZETT): 415 MS
QTC FREDERICIA: 410 MS
R AXIS: 40 DEGREES
T AXIS: 28 DEGREES
T OFFSET: 453 MS
VENTRICULAR RATE: 64 BPM

## 2025-07-28 ENCOUNTER — PATIENT MESSAGE (OUTPATIENT)
Dept: PRIMARY CARE | Facility: CLINIC | Age: 35
End: 2025-07-28
Payer: COMMERCIAL

## 2025-07-28 ENCOUNTER — HOSPITAL ENCOUNTER (EMERGENCY)
Facility: HOSPITAL | Age: 35
Discharge: HOME | End: 2025-07-28
Payer: COMMERCIAL

## 2025-07-28 ENCOUNTER — TELEPHONE (OUTPATIENT)
Age: 35
End: 2025-07-28
Payer: COMMERCIAL

## 2025-07-28 VITALS
SYSTOLIC BLOOD PRESSURE: 127 MMHG | HEART RATE: 58 BPM | WEIGHT: 262 LBS | OXYGEN SATURATION: 99 % | DIASTOLIC BLOOD PRESSURE: 79 MMHG | TEMPERATURE: 98.1 F | HEIGHT: 72 IN | BODY MASS INDEX: 35.49 KG/M2 | RESPIRATION RATE: 16 BRPM

## 2025-07-28 DIAGNOSIS — N30.90 CYSTITIS: Primary | ICD-10-CM

## 2025-07-28 DIAGNOSIS — K64.9 HEMORRHOIDS, UNSPECIFIED HEMORRHOID TYPE: ICD-10-CM

## 2025-07-28 DIAGNOSIS — Z41.9 SURGERY, ELECTIVE: ICD-10-CM

## 2025-07-28 LAB
APPEARANCE UR: CLEAR
BILIRUB UR STRIP.AUTO-MCNC: NEGATIVE MG/DL
COLOR UR: YELLOW
GLUCOSE UR STRIP.AUTO-MCNC: NORMAL MG/DL
KETONES UR STRIP.AUTO-MCNC: NEGATIVE MG/DL
LEUKOCYTE ESTERASE UR QL STRIP.AUTO: ABNORMAL
MUCOUS THREADS #/AREA URNS AUTO: NORMAL /LPF
NITRITE UR QL STRIP.AUTO: NEGATIVE
PH UR STRIP.AUTO: 6.5 [PH]
PROT UR STRIP.AUTO-MCNC: NEGATIVE MG/DL
RBC # UR STRIP.AUTO: NEGATIVE MG/DL
RBC #/AREA URNS AUTO: NORMAL /HPF
SP GR UR STRIP.AUTO: 1.02
SQUAMOUS #/AREA URNS AUTO: NORMAL /HPF
UROBILINOGEN UR STRIP.AUTO-MCNC: ABNORMAL MG/DL
WBC #/AREA URNS AUTO: NORMAL /HPF

## 2025-07-28 PROCEDURE — 87661 TRICHOMONAS VAGINALIS AMPLIF: CPT | Mod: PORLAB | Performed by: NURSE PRACTITIONER

## 2025-07-28 PROCEDURE — 81001 URINALYSIS AUTO W/SCOPE: CPT | Performed by: NURSE PRACTITIONER

## 2025-07-28 PROCEDURE — 87491 CHLMYD TRACH DNA AMP PROBE: CPT | Mod: PORLAB | Performed by: NURSE PRACTITIONER

## 2025-07-28 PROCEDURE — 99283 EMERGENCY DEPT VISIT LOW MDM: CPT

## 2025-07-28 PROCEDURE — 87086 URINE CULTURE/COLONY COUNT: CPT | Mod: PORLAB | Performed by: NURSE PRACTITIONER

## 2025-07-28 PROCEDURE — 2500000002 HC RX 250 W HCPCS SELF ADMINISTERED DRUGS (ALT 637 FOR MEDICARE OP, ALT 636 FOR OP/ED): Performed by: NURSE PRACTITIONER

## 2025-07-28 RX ORDER — HYDROCORTISONE ACETATE 25 MG/1
25 SUPPOSITORY RECTAL 2 TIMES DAILY
Qty: 14 SUPPOSITORY | Refills: 0 | Status: SHIPPED | OUTPATIENT
Start: 2025-07-28 | End: 2025-08-08

## 2025-07-28 RX ORDER — BISACODYL 5 MG
10 TABLET, DELAYED RELEASE (ENTERIC COATED) ORAL SEE ADMIN INSTRUCTIONS
Qty: 2 TABLET | Refills: 0 | Status: SHIPPED | OUTPATIENT
Start: 2025-07-28

## 2025-07-28 RX ORDER — SULFAMETHOXAZOLE AND TRIMETHOPRIM 800; 160 MG/1; MG/1
1 TABLET ORAL 2 TIMES DAILY
Qty: 14 TABLET | Refills: 0 | Status: SHIPPED | OUTPATIENT
Start: 2025-07-28 | End: 2025-08-04

## 2025-07-28 RX ORDER — CIPROFLOXACIN 500 MG/1
500 TABLET, FILM COATED ORAL ONCE
Status: DISCONTINUED | OUTPATIENT
Start: 2025-07-28 | End: 2025-07-28

## 2025-07-28 RX ORDER — SULFAMETHOXAZOLE AND TRIMETHOPRIM 800; 160 MG/1; MG/1
1 TABLET ORAL ONCE
Status: COMPLETED | OUTPATIENT
Start: 2025-07-28 | End: 2025-07-28

## 2025-07-28 RX ORDER — ALBUTEROL SULFATE 90 UG/1
2 INHALANT RESPIRATORY (INHALATION) EVERY 6 HOURS PRN
COMMUNITY

## 2025-07-28 RX ORDER — CHLORHEXIDINE GLUCONATE 40 MG/ML
SOLUTION TOPICAL SEE ADMIN INSTRUCTIONS
Qty: 118 ML | Refills: 0 | Status: SHIPPED | OUTPATIENT
Start: 2025-07-28

## 2025-07-28 RX ADMIN — SULFAMETHOXAZOLE AND TRIMETHOPRIM 1 TABLET: 800; 160 TABLET ORAL at 22:38

## 2025-07-28 ASSESSMENT — LIFESTYLE VARIABLES
HAVE YOU EVER FELT YOU SHOULD CUT DOWN ON YOUR DRINKING: NO
TOTAL SCORE: 0
EVER HAD A DRINK FIRST THING IN THE MORNING TO STEADY YOUR NERVES TO GET RID OF A HANGOVER: NO
EVER FELT BAD OR GUILTY ABOUT YOUR DRINKING: NO
HAVE PEOPLE ANNOYED YOU BY CRITICIZING YOUR DRINKING: NO

## 2025-07-28 ASSESSMENT — PAIN - FUNCTIONAL ASSESSMENT: PAIN_FUNCTIONAL_ASSESSMENT: 0-10

## 2025-07-28 ASSESSMENT — PAIN SCALES - GENERAL: PAINLEVEL_OUTOF10: 0 - NO PAIN

## 2025-07-28 NOTE — TELEPHONE ENCOUNTER
Pre-Op Phone Call    Verified patient by name and date of birth. Done    Verified procedure and DOS. Done    Verified patient is planning for Inpatient stay >1 night Done    Verified consent for sterilization is signed. N/A    Verified letters of support in chart. Dates: 11.16.24 & 12.29.24 Done    Verified known drug allergies and updated as needed. Done    Verified medication list and updated as needed. Done    Verified pharmacy and updated as needed. Done    Surgical History No GAS Sx    Anesthesia Yes, but not general    Blood Transfusion Never and OK if needed    Nicotine Never    Drug Use Current occasional marijuana/edibles    ETOH Rarely    Social Support Partner, Friend(s), and Other Family Member(s)    Living Accommodations House, No Stairs and Primary living space all on one level    Financial Support Employed Full Time, FMLA/STD documentation already completed, and Patient advised to allow approximately 8 weeks of leave from work.    Verified preop labs are ordered or resulted. Patient is using  Lab    Submitted prescriptions for preop prep of Chlorhexidine Soap and Bisacodyl Tablets    Verified first follow-up visit is scheduled. Date: 9.2.25 Done    Discussed need and importance of PFPT. Order submitted.  locations provided. Done    Addressed patient's questions. Done    Encouraged patient to contact the team with any other questions or concerns. Done

## 2025-07-28 NOTE — ED PROVIDER NOTES
HPI   Chief Complaint   Patient presents with    Blood in Urine     Rectal and urinating blood.  No pain       34-year-old transgender female presents the emergency department today for hematuria and dysuria.  Patient states that symptoms have been present for the past couple of days.  Bowel movement today and there was some blood in the toilet.  Blood was not on tissue paper.  Has had some constipation and pain with bowel movement.  Has a history of hemorrhoids in the past has not been using cream.  No flank pain nausea vomiting dizziness lightheadedness or syncope.  Denies any chest pain or shortness of breath.  History of kidney stones previously.  No fever or chills.  Possibility for STD.                          Jayshree Coma Scale Score: 15                  Patient History   Medical History[1]  Surgical History[2]  Family History[3]  Social History[4]    Physical Exam   ED Triage Vitals   Temperature Heart Rate Respirations BP   07/28/25 1816 07/28/25 1817 07/28/25 1816 07/28/25 1817   36.6 °C (97.9 °F) 67 16 123/80      Pulse Ox Temp src Heart Rate Source Patient Position   07/28/25 1817 -- 07/28/25 1816 --   98 %  Monitor       BP Location FiO2 (%)     -- --             Physical Exam  Vitals and nursing note reviewed.   Constitutional:       General: She is not in acute distress.     Appearance: Normal appearance. She is not toxic-appearing.   HENT:      Right Ear: Tympanic membrane normal.      Left Ear: Tympanic membrane normal.      Mouth/Throat:      Mouth: Mucous membranes are moist.      Pharynx: Oropharynx is clear.     Eyes:      Extraocular Movements: Extraocular movements intact.      Pupils: Pupils are equal, round, and reactive to light.       Cardiovascular:      Rate and Rhythm: Normal rate and regular rhythm.      Pulses: Normal pulses.      Heart sounds: Normal heart sounds.   Pulmonary:      Effort: Pulmonary effort is normal.      Breath sounds: Normal breath sounds.   Abdominal:       General: Abdomen is flat. Bowel sounds are normal.      Palpations: Abdomen is soft.      Tenderness: There is no abdominal tenderness. There is no right CVA tenderness or left CVA tenderness.     Musculoskeletal:         General: Normal range of motion.      Cervical back: Normal range of motion and neck supple.     Skin:     General: Skin is warm and dry.      Capillary Refill: Capillary refill takes less than 2 seconds.     Neurological:      General: No focal deficit present.      Mental Status: She is alert and oriented to person, place, and time.     Psychiatric:         Mood and Affect: Mood normal.         Behavior: Behavior normal.         Judgment: Judgment normal.         Labs Reviewed   C. TRACHOMATIS / N. GONORRHOEAE, AMPLIFIED, UROGENITAL   TRICH VAGINALIS, AMPLIFIED   URINALYSIS WITH REFLEX CULTURE AND MICROSCOPIC    Narrative:     The following orders were created for panel order Urinalysis with Reflex Culture and Microscopic.  Procedure                               Abnormality         Status                     ---------                               -----------         ------                     Urinalysis with Reflex C...[[id number]]                                                 Extra Urine Gray Tube[[id number]]                                                         Please view results for these tests on the individual orders.   URINALYSIS WITH REFLEX CULTURE AND MICROSCOPIC   EXTRA URINE GRAY TUBE     Pain Management Panel          Latest Ref Rng & Units 7/10/2024   Pain Management Panel   Amphetamine Screen, Urine Presumptive Negative Presumptive Negative    Barbiturate Screen, Urine Presumptive Negative Presumptive Negative    Benzodiazepines Screen, Urine Presumptive Negative Presumptive Negative    Fentanyl Screen, Urine Presumptive Negative Presumptive Negative    Methadone Screen, Urine Presumptive Negative Presumptive Negative      No orders to display       ED Course & MDM   Diagnoses  as of 07/28/25 2228   Cystitis   Hemorrhoids, unspecified hemorrhoid type       Medical Decision Making  On initial valuation patient is well-appearing emergency room at this time.  Orthostatics are negative.  Deferred rectal.  Urine is positive for leukocytes.  Patient is symptomatic.  Culture pending as well as trichomoniasis, gonorrhea.  Does not want prophylactic treatment for these.  Labs considered however patient prefers to go home at this point.  Orthostatic negative.  Given a dose of Bactrim in the ED.  Discharged home with Anusol as well.  I discussed the importance of return precautions close outpatient follow-up.  Verbalized understanding of the plan will be discharged in stable condition.        Procedure  Procedures      Differential Diagnoses include std, uti, hemorrhoids, GI bleed   This is not an exhaustive list of all the diagnosis and therapeutics are considered during the patient's evaluation for an emergency medical condition.    I discussed the differential, results and discharge plan with the patient and/or family/friend/caregiver if present.  I emphasized the importance of follow-up with the physician I referred them to in the timeframe recommended.  I explained reasons for the patient to return to the Emergency Department. Additional verbal discharge instructions were also given and discussed with the patient to supplement those generated by the EMR. We also discussed medications that were prescribed (if any) including common side effects and interactions. The patient was advised to abstain from driving, operating heavy machinery or making significant decisions while taking medications such as opiates and muscle relaxers that may impair this. All questions were addressed.  They understand return precautions and discharge instructions. The patient and/or family/friend/caregiver expressed understanding.             [1]   Past Medical History:  Diagnosis Date    ADD (attention deficit disorder)      ADHD (attention deficit hyperactivity disorder)     Allergic     Anxiety     Autism ([facility])     Bipolar disorder     Chondromalacia patellae, right knee 10/24/2019    Chondromalacia of right patella    Chronic headaches     Chronic low back pain 04/13/2023    Chronic pain disorder     Depression     Dizziness     Dorsalgia, unspecified 06/24/2020    Acute back pain    Easy bruising     Flushing 01/06/2021    Flushing    Gender dysphoria     Generalized abdominal pain 11/02/2021    Abdominal pain, generalized    GERD (gastroesophageal reflux disease)     GI (gastrointestinal bleed) 2020    Headache     Hiatal hernia     History of adverse reaction to anesthesia 1995    Novocaine has no effect on me.    HL (hearing loss)     Left ear only    Kidney stone 10/20(?)/24    Local infection of the skin and subcutaneous tissue, unspecified 10/06/2020    Skin infection, bacterial    Nausea 03/10/2021    Nausea in adult    Other forms of dyspnea 03/08/2018    Dyspnea on exertion    Other hemorrhoids 07/15/2021    Internal hemorrhoids    Other skin changes 07/08/2021    Skin irritation    Otitis media, unspecified, left ear 05/01/2020    Acute otitis media, left    Personal history of COVID-19     Personal history of COVID-19    Personal history of diseases of the skin and subcutaneous tissue 03/10/2021    History of dermatitis    Personal history of other diseases of the respiratory system 10/06/2021    History of sore throat    Personal history of other diseases of the respiratory system 10/06/2021    History of laryngitis    Personal history of other specified conditions 08/17/2021    History of dysphagia    Personal history of other specified conditions 05/03/2018    History of palpitations    Personal history of other specified conditions 05/03/2018    History of exertional chest pain    Personal history of other specified conditions 05/03/2018    History of syncope    PONV (postoperative nausea and vomiting)      PTSD (post-traumatic stress disorder)     Seasonal asthma ([facility])     Seizure disorder (Multi)     Syncope    [2]   Past Surgical History:  Procedure Laterality Date    COLONOSCOPY      ESOPHAGOGASTRODUODENOSCOPY  05/20/2025    INNER EAR SURGERY  03/08/2018    Inner Ear Surgery    MASS EXCISION  06/27/2025    OTHER SURGICAL HISTORY  04/05/2022    Oral surgery    OTHER SURGICAL HISTORY  08/06/2021    Esophagogastroduodenoscopy    OTHER SURGICAL HISTORY  08/06/2021    Colonoscopy    TONSILLECTOMY  03/08/2018    Tonsillectomy    UPPER GASTROINTESTINAL ENDOSCOPY     [3]   Family History  Problem Relation Name Age of Onset    Other (cva) Other Kristian Terrell (Father)     Epilepsy Other Kristian Terrell (Father)     Ulcerative colitis Other Kristian Terrell (Father)     Hypertension Other Kristian Terrell (Father)     Stroke Mother Carissa Kauffman     Alcohol abuse Father Kristian Terrell     Alcohol abuse Brother Rolando Terrell     Asthma Mother Carissa Daly 10 - 19   [4]   Social History  Tobacco Use    Smoking status: Never     Passive exposure: Never    Smokeless tobacco: Never   Vaping Use    Vaping status: Never Used   Substance Use Topics    Alcohol use: Yes     Comment: very rare    Drug use: Yes     Frequency: 0.5 times per week     Types: Marijuana     Comment: ocassional edibles        DAMION Thomson-LÁZARO  07/28/25 2302

## 2025-07-28 NOTE — Clinical Note
Jennifer Terrell was seen and treated in our emergency department on 7/28/2025.  She may return to work on 07/29/2025.       If you have any questions or concerns, please don't hesitate to call.      Haleigh Hancock, APRN-CNP

## 2025-07-29 LAB
C TRACH RRNA SPEC QL NAA+PROBE: NEGATIVE
HOLD SPECIMEN: NORMAL
N GONORRHOEA DNA SPEC QL PROBE+SIG AMP: NEGATIVE
T VAGINALIS RRNA SPEC QL NAA+PROBE: NEGATIVE

## 2025-07-30 ENCOUNTER — PATIENT MESSAGE (OUTPATIENT)
Dept: PRIMARY CARE | Facility: CLINIC | Age: 35
End: 2025-07-30
Payer: COMMERCIAL

## 2025-07-30 DIAGNOSIS — N39.0 ACUTE UTI: Primary | ICD-10-CM

## 2025-07-30 LAB — BACTERIA UR CULT: ABNORMAL

## 2025-07-30 RX ORDER — VANCOMYCIN HYDROCHLORIDE 250 MG/1
500 CAPSULE ORAL 2 TIMES DAILY
Qty: 28 CAPSULE | Refills: 0 | Status: SHIPPED | OUTPATIENT
Start: 2025-07-30 | End: 2025-07-31

## 2025-07-31 ENCOUNTER — RESULTS FOLLOW-UP (OUTPATIENT)
Dept: PHARMACY | Facility: HOSPITAL | Age: 35
End: 2025-07-31
Payer: COMMERCIAL

## 2025-07-31 NOTE — PROGRESS NOTES
EDPD Note: Rapid Result Review    I reviewed Jennifer Terrell 's chart regarding a positive urine culture/result that was taken during their recent emergency room visit. The patient was not told about their finalized results prior to leaving the emergency department. Therefore, patient was not contacted as proper education was given by another provider: PCP, Minda Veliz DO. No further follow up needed from EDPD Team.     Susceptibility data from last 90 days.  Collected Specimen Info Organism   07/28/25 Urine from Clean Catch/Voided Streptococcus agalactiae (Group B Streptococcus)     Admission on 07/28/2025, Discharged on 07/28/2025   Component Date Value Ref Range Status    Neisseria gonorrhea,Amplified 07/28/2025 Negative  Negative Final    Chlamydia trachomatis, Amplified 07/28/2025 Negative  Negative Final    Trichomonas Vaginalis 07/28/2025 Negative  Negative Final    Performance characteristics for Trichomonas Vaginalis testing on urine samples has been validated by [facility].  Testing on this sample type is not FDA-approved, but such approval is not necessary. This laboratory is certified by CLIA to perform high complexity testing.    Color, Urine 07/28/2025 Yellow  Light-Yellow, Yellow, Dark-Yellow Final    Appearance, Urine 07/28/2025 Clear  Clear Final    Specific Gravity, Urine 07/28/2025 1.018  1.005 - 1.035 Final    pH, Urine 07/28/2025 6.5  5.0, 5.5, 6.0, 6.5, 7.0, 7.5, 8.0 Final    Protein, Urine 07/28/2025 NEGATIVE  NEGATIVE, 10 (TRACE), 20 (TRACE) mg/dL Final    Glucose, Urine 07/28/2025 Normal  Normal mg/dL Final    Blood, Urine 07/28/2025 NEGATIVE  NEGATIVE mg/dL Final    Ketones, Urine 07/28/2025 NEGATIVE  NEGATIVE mg/dL Final    Bilirubin, Urine 07/28/2025 NEGATIVE  NEGATIVE mg/dL Final    Urobilinogen, Urine 07/28/2025 2 (1+) (A)  Normal mg/dL Final    Due to a manufacturing issue, low positive urobilinogen results may be falsely positive. Correlate with urine  bilirubin and additional clinical/laboratory findings to assess the risk of hemolytic anemia or liver disease. If clinically indicated, repeat testing with an alternate method is available by contacting the laboratory within 24 hours.    Some pigments and medications may cause a false positive urobilinogen.    Nitrite, Urine 07/28/2025 NEGATIVE  NEGATIVE Final    Leukocyte Esterase, Urine 07/28/2025 25 Gayle/uL (A)  NEGATIVE Final    WBC, Urine 07/28/2025 1-5  1-5, NONE /HPF Final    RBC, Urine 07/28/2025 1-2  NONE, 1-2, 3-5 /HPF Final    Squamous Epithelial Cells, Urine 07/28/2025 1-9 (SPARSE)  Reference range not established. /HPF Final    Mucus, Urine 07/28/2025 FEW  Reference range not established. /LPF Final    Urine Culture 07/28/2025 >=100,000 CFU/mL Streptococcus agalactiae (Group B Streptococcus) (A)   Final       If there are any other questions for the ED Post-Discharge Culture Follow Up Team, please contact [phone]. Fax: [phone].    Lachelle Weinberg, JoannD

## 2025-08-08 ENCOUNTER — PRE-ADMISSION TESTING (OUTPATIENT)
Dept: PREADMISSION TESTING | Facility: HOSPITAL | Age: 35
End: 2025-08-08
Payer: COMMERCIAL

## 2025-08-08 VITALS
DIASTOLIC BLOOD PRESSURE: 78 MMHG | WEIGHT: 262.79 LBS | BODY MASS INDEX: 35.59 KG/M2 | SYSTOLIC BLOOD PRESSURE: 123 MMHG | RESPIRATION RATE: 16 BRPM | HEIGHT: 72 IN | TEMPERATURE: 96.8 F | OXYGEN SATURATION: 99 % | HEART RATE: 63 BPM

## 2025-08-08 DIAGNOSIS — G40.909 SEIZURE DISORDER (MULTI): ICD-10-CM

## 2025-08-08 DIAGNOSIS — F41.9 ANXIETY: ICD-10-CM

## 2025-08-08 DIAGNOSIS — Z01.818 ENCOUNTER FOR PREADMISSION TESTING: Primary | ICD-10-CM

## 2025-08-08 DIAGNOSIS — J45.20 MILD INTERMITTENT ASTHMA WITHOUT COMPLICATION (HHS-HCC): ICD-10-CM

## 2025-08-08 DIAGNOSIS — F64.9 GENDER DYSPHORIA: Chronic | ICD-10-CM

## 2025-08-08 DIAGNOSIS — K21.9 GASTROESOPHAGEAL REFLUX DISEASE, UNSPECIFIED WHETHER ESOPHAGITIS PRESENT: ICD-10-CM

## 2025-08-08 DIAGNOSIS — N40.0 BENIGN PROSTATIC HYPERPLASIA, UNSPECIFIED WHETHER LOWER URINARY TRACT SYMPTOMS PRESENT: ICD-10-CM

## 2025-08-08 PROCEDURE — 99204 OFFICE O/P NEW MOD 45 MIN: CPT | Performed by: NURSE PRACTITIONER

## 2025-08-08 ASSESSMENT — DUKE ACTIVITY SCORE INDEX (DASI)
TOTAL_SCORE: 36.7
CAN YOU CLIMB A FLIGHT OF STAIRS OR WALK UP A HILL: YES
CAN YOU PARTICIPATE IN MODERATE RECREATIONAL ACTIVITIES LIKE GOLF, BOWLING, DANCING, DOUBLES TENNIS OR THROWING A BASEBALL OR FOOTBALL: NO
CAN YOU DO HEAVY WORK AROUND THE HOUSE LIKE SCRUBBING FLOORS OR LIFTING AND MOVING HEAVY FURNITURE: NO
DASI METS SCORE: 7.3
CAN YOU PARTICIPATE IN STRENOUS SPORTS LIKE SWIMMING, SINGLES TENNIS, FOOTBALL, BASKETBALL, OR SKIING: NO
CAN YOU TAKE CARE OF YOURSELF (EAT, DRESS, BATHE, OR USE TOILET): YES
CAN YOU DO YARD WORK LIKE RAKING LEAVES, WEEDING OR PUSHING A MOWER: YES
CAN YOU WALK INDOORS, SUCH AS AROUND YOUR HOUSE: YES
CAN YOU DO LIGHT WORK AROUND THE HOUSE LIKE DUSTING OR WASHING DISHES: YES
CAN YOU HAVE SEXUAL RELATIONS: YES
CAN YOU WALK A BLOCK OR TWO ON LEVEL GROUND: YES
CAN YOU RUN A SHORT DISTANCE: YES
CAN YOU DO MODERATE WORK AROUND THE HOUSE LIKE VACUUMING, SWEEPING FLOORS OR CARRYING GROCERIES: YES

## 2025-08-08 ASSESSMENT — PAIN - FUNCTIONAL ASSESSMENT: PAIN_FUNCTIONAL_ASSESSMENT: 0-10

## 2025-08-08 ASSESSMENT — PAIN SCALES - GENERAL: PAINLEVEL_OUTOF10: 2

## 2025-08-08 NOTE — CPM/PAT H&P
CPM/PAT Evaluation       Name: Jennifer Terrell (Jennifer Terrell)  /Age: 1990/34 y.o.     In-Person       Chief Complaint: PAT for planned urology surgery    34 yr old biological male w/PHx of anxiety, depression, bipolar, seizure disorder, seasonal asthma, GERD, BPH, obesity (BMI 35) and gender dysphoria referred to PAT for planned Robotic assisted vaginoplasty w/Pope Christie and Marcial on 2025      Patient reports feeling overall well, denies fever or cough, however patient was seen recently for uti and treated with antibiotic, states symptoms have resolved.  Instructed to contact surgeon office if symptoms return, patient verbalized understanding.  Denies hx of stroke or blood clot, however reports seizure disorder with last one occurring over a month ago (tonic clonic) triggered by bright flashing lights and high pitched noises.  Reports moderately physically active, adding job is sedentary and requires sitting at desk throughout the day; denies cardiac or respiratory symptoms. Past surgical hx includes inner ear, tonsillectomy, EGD, colonoscopy and upper back cyst excision (2025); reports hx of slow emergence.      Followed by PCP (Minda Veliz DO) 2025  Followed by mental health (Roxie Peacock/Nadia Orantes/Sania BRUNO) at [facility]  Followed by neurology (Amando Reed DO) 2025  Followed by gastroenterology (Alison BRUNO)   Followed by urology (Dipti Rosa MD)         Medical History[1]    Surgical History[2]    Patient  reports being sexually active and has had partner(s) who are male, nonbinary, transgender male / female-to-male, and gender-diverse. She reports using the following method of birth control/protection: Condom Female.    Family History[3]    Allergies[4]    Prior to Admission medications   Medication Sig Start Date End Date Taking? Authorizing Provider   ARIPiprazole (Abilify) 20 mg tablet Take 1 tablet (20 mg) by mouth once  daily. 2/2/24  Yes Historical Provider, MD   bicalutamide (Casodex) 50 mg tablet Take 1 tablet by mouth once daily  Patient taking differently: Take 1 tablet (50 mg total) by mouth every other day. 1/24/25  Yes [place]  Keren, DO   cetirizine (ZyrTEC) 10 mg tablet Take 1 tablet (10 mg) by mouth once daily. Allergy medication 6/12/25 6/12/26 Yes [facility] Keren, DO   estradioL 1.25 mg/1.25 gram (0.1 %) gel in packet Place 3.75 mg on the skin once daily. (3 packets) 8/26/24 8/26/25 Yes [facility] Keren, DO   hydrocortisone (Anusol-HC) 25 mg suppository Insert 1 suppository (25 mg) into the rectum 2 times a day for 7 days. 7/28/25 8/8/25 Yes DAMION hTomson-CNP   hydrOXYzine pamoate (Vistaril) 25 mg capsule Take 1 capsule (25 mg) by mouth 3 times a day as needed for anxiety. 11/6/24  Yes Historical Provider, MD   lamoTRIgine (LaMICtal) 150 mg tablet Take 1 tablet (150 mg) by mouth 2 times a day. 4/10/24  Yes Historical Provider, MD   levETIRAcetam (Keppra) 750 mg tablet Take 2 tablets (1,500 mg) by mouth every 12 hours. 3/19/24  Yes Historical Provider, MD   multivitamin tablet Take 1 tablet by mouth once daily.   Yes Historical Provider, MD   omeprazole (PriLOSEC) 40 mg DR capsule TAKE 1 CAPSULE BY MOUTH TWICE A DAY BEFORE MEALS. DO NOT CRUSH OR CHEW  Patient taking differently: Take 1 capsule (40 mg) by mouth 2 times a day. 6/16/25  Yes Shoshana Horne PA-C   prazosin (Minipress) 1 mg capsule Take 1 capsule (1 mg) by mouth once daily at bedtime. For nightmares  Patient taking differently: Take 2 capsules (2 mg) by mouth once daily at bedtime. For nightmares 4/22/24  Yes Historical Provider, MD   progesterone (Prometrium) 200 mg capsule Take 1 capsule (200 mg) by mouth once daily. 5/8/25 5/8/26 Yes [facility] Factor, DO   tiZANidine (Zanaflex) 2 mg tablet TAKE 1 TABLET BY MOUTH TWICE A DAY AS NEEDED FOR MUSCLE SPASMS  Patient taking differently: Take 1 tablet (2 mg) by mouth 2 times a day. 7/7/25   "Yes Virginia DESIREE Keren, DO   traZODone (Desyrel) 100 mg tablet Take 1 tablet (100 mg) by mouth once daily at bedtime. 1/10/18  Yes Historical Provider, MD   zonisamide (Zonegran) 100 mg capsule Take 2 capsules (200 mg) by mouth once daily at bedtime. migraines 11/2/18  Yes Historical Provider, MD   albuterol (Proventil HFA) 90 mcg/actuation inhaler Inhale 2 puffs every 6 hours if needed for wheezing.    Historical Provider, MD   bisacodyl (Dulcolax) 5 mg EC tablet Take 2 tablets (10 mg) by mouth see administration instructions for 1 dose. See Preoperative Instructions document for detailed instructions. 7/28/25   Nestor Warner, APRN-CNP   chlorhexidine (Hibiclens) 4 % external liquid Apply topically see administration instructions for 2 doses. See Preoperative Instructions document for detailed instructions. 7/28/25   Nestor Warner APRN-CNP   sulfamethoxazole-trimethoprim (Bactrim DS) 800-160 mg tablet Take 1 tablet by mouth 2 times a day for 7 days. 7/28/25 8/4/25  Haleigh Hancock, DAMION-CNP        A ten-point review of systems was completed and is otherwise negative except for what is mentioned in the HPI above.    Physical Exam  Vitals reviewed.   Constitutional:       Appearance: Normal appearance.   HENT:      Head: Normocephalic.      Mouth/Throat:      Mouth: Mucous membranes are moist.     Eyes:      Pupils: Pupils are equal, round, and reactive to light.       Cardiovascular:      Rate and Rhythm: Normal rate and regular rhythm.   Pulmonary:      Effort: Pulmonary effort is normal.      Breath sounds: Normal breath sounds.   Abdominal:      General: Bowel sounds are normal.     Musculoskeletal:         General: Normal range of motion.      Comments: Bruising to LUE; healing  States caused by partner who \"likes to bite\"  Denies domestic violence     Skin:     General: Skin is warm and dry.     Neurological:      Mental Status: She is alert and oriented to person, place, and time. "     Psychiatric:         Mood and Affect: Mood normal.          Airway    Testing/Diagnostic: BMP, CBC, ecg    Patient Specialist/PCP: Minda Veliz DO (PCP) 6/2/2025; Roxie Peacock/Nadia Orantes/Sania BRUNO (mental health), Alison BRUNO (GI); Dipti Rosa (urology)    Visit Vitals  /78   Pulse 63   Temp 36 °C (96.8 °F) (Tympanic)   Resp 16   Ht 1.829 m (6')   Wt 119 kg (262 lb 12.6 oz)   SpO2 99%   BMI 35.64 kg/m²   Smoking Status Never   BSA 2.46 m²       DASI Risk Score      Flowsheet Row Pre-Admission Testing from 8/8/2025 in [facility]   Can you take care of yourself (eat, dress, bathe, or use toilet)?  2.75 filed at 08/08/2025 0756   Can you walk indoors, such as around your house? 1.75 filed at 08/08/2025 0756   Can you walk a block or two on level ground?  2.75 filed at 08/08/2025 0756   Can you climb a flight of stairs or walk up a hill? 5.5 filed at 08/08/2025 0756   Can you run a short distance? 8 filed at 08/08/2025 0756   Can you do light work around the house like dusting or washing dishes? 2.7 filed at 08/08/2025 0756   Can you do moderate work around the house like vacuuming, sweeping floors or carrying groceries? 3.5 filed at 08/08/2025 0756   Can you do heavy work around the house like scrubbing floors or lifting and moving heavy furniture?  0 filed at 08/08/2025 0756   Can you do yard work like raking leaves, weeding or pushing a mower? 4.5 filed at 08/08/2025 0756   Can you have sexual relations? 5.25 filed at 08/08/2025 0756   Can you participate in moderate recreational activities like golf, bowling, dancing, doubles tennis or throwing a baseball or football? 0 filed at 08/08/2025 0756   Can you participate in strenous sports like swimming, singles tennis, football, basketball, or skiing? 0 filed at 08/08/2025 0756   DASI SCORE 36.7 filed at 08/08/2025 [facility]6   METS Score (Will be calculated only when all the questions are answered) 7.3 filed at 08/08/2025 0755      Caprini DVT Assessment    No data to display  Modified Frailty Index    No data to display  [id number] Stroke Risk Points  Current as of 4 minutes ago        N/A 0 to 9 Points:      Last Change: N/A          The FTF1BD2-HXGg risk score (Shannon CHAMBERS, et al. 2009. © 2010 American [employer]) quantifies the risk of stroke for a patient with atrial fibrillation. For patients without atrial fibrillation or under the age of 18 this score appears as N/A. Higher score values generally indicate higher risk of stroke.        This score is not applicable to this patient. Components are not calculated.          Revised Cardiac Risk Index    No data to display  Apfel Simplified Score    No data to display  Risk Analysis Index Results This Encounter    No data found in the last 10 encounters.       Stop Bang Score      Flowsheet Row Pre-Admission Testing [facility] 8/8/2025 in [facility]   Do you snore loudly? 1 filed at 08/08/2025 0811   Do you often feel tired or fatigued after your sleep? 1 filed at 08/08/2025 0811   Has anyone ever observed you stop breathing in your sleep? 0 filed at 08/08/2025 0811   Do you have or are you being treated for high blood pressure? 0 filed at 08/08/2025 0811   Recent BMI (Calculated) 35.6 filed at 08/08/2025 0811   Is BMI greater than 35 kg/m2? 1=Yes filed at 08/08/2025 0811   Age older than 50 years old? 0=No filed at 08/08/2025 0811   Gender - Male 1=Yes filed at 08/08/2025 0811     Prodigy: High Risk  Total Score: 8              Prodigy Gender Score          ARISCAT Score for Postoperative Pulmonary Complications      Flowsheet Row Pre-Admission Testing from 8/8/2025 in [facility]   Age Calculated Score 0 filed at 08/08/2025 0851   Preoperative SpO2 0 filed at 08/08/2025 0851   Respiratory infection in the last month Either upper or lower (i.e., URI, bronchitis, pneumonia), with fever and antibiotic treatment 0 filed at 08/08/2025 0851   Preoperative  anemia (Hgb less than 10 g/dl) 0 filed at 08/08/2025 0851   Surgical incision  0 filed at 08/08/2025 0851   Duration of surgery  16 filed at 08/08/2025 0851   Emergency Procedure  0 filed at 08/08/2025 0851   ARISCAT Total Score  16 filed at 08/08/2025 0851     Kelvin Perioperative Risk for Myocardial Infarction or Cardiac Arrest (GALINA)      Flowsheet Row Pre-Admission Testing from 8/8/2025 in [facility]   Calculated Age Score 0.7 filed at 08/08/2025 0852   Functional Status  0 filed at 08/08/2025 0852   ASA Class  -3.29 filed at 08/08/2025 0852   Creatinine 0 filed at 08/08/2025 0852   Type of Procedure  -0.26 filed at 08/08/2025 0852   GALINA Total Score  -8.1 filed at 08/08/2025 0852   GALINA % 0.03 filed at 08/08/2025 0852       Assessment and Plan:     # Anxiety/depression/bipolar - continue abilify; followed by mental health attending routine therapy sessions  # Seizure disorder - continue levetiracetam, lamotrigine, zonisamide; most recent event about 1-2 months ago (tonic clonic), states triggered by bright flashing lights and high pitched noises; followed by neurology  # Seasonal asthma - continue inhalers; last use one week ago  # GERD - continue omeprazole; followed by GI  # BPH - continue prazosin  # Obesity (BMI 35) - activity/diet discussed/encouraged  # Gender dysphoria - Robotic assisted vaginoplasty w/Pope Christie and Marcial on 8/19/2025    Ecg complete 6/17/2025 - Sinus rhythm, Nonspecific T abnormalities, anterior leads (64 bpm)  Medical hx, Allergies, VS and Labs reviewed  Medications addressed w/pre-op instructions provided                 [1]   Past Medical History:  Diagnosis Date    ADD (attention deficit disorder)     ADHD (attention deficit hyperactivity disorder)     Allergic     Anxiety     Autism ([facility])     Bipolar disorder     Chondromalacia patellae, right knee 10/24/2019    Chondromalacia of right patella    Chronic headaches     Chronic low back pain 04/13/2023     Chronic pain disorder     Depression     Dizziness     Dorsalgia, unspecified 06/24/2020    Acute back pain    Easy bruising     Flushing 01/06/2021    Flushing    Gender dysphoria     Generalized abdominal pain 11/02/2021    Abdominal pain, generalized    GERD (gastroesophageal reflux disease)     GI (gastrointestinal bleed) 2020    Headache     Hiatal hernia     History of adverse reaction to anesthesia 1995    Novocaine has no effect on me.    HL (hearing loss)     Left ear only    Kidney stone 10/20(?)/24    Local infection of the skin and subcutaneous tissue, unspecified 10/06/2020    Skin infection, bacterial    Nausea 03/10/2021    Nausea in adult    Nephrolithiasis     Other forms of dyspnea 03/08/2018    Dyspnea on exertion    Other hemorrhoids 07/15/2021    Internal hemorrhoids    Other skin changes 07/08/2021    Skin irritation    Otitis media, unspecified, left ear 05/01/2020    Acute otitis media, left    Personal history of COVID-19     Personal history of COVID-19    Personal history of diseases of the skin and subcutaneous tissue 03/10/2021    History of dermatitis    Personal history of other diseases of the respiratory system 10/06/2021    History of sore throat    Personal history of other diseases of the respiratory system 10/06/2021    History of laryngitis    Personal history of other specified conditions 08/17/2021    History of dysphagia    Personal history of other specified conditions 05/03/2018    History of palpitations    Personal history of other specified conditions 05/03/2018    History of exertional chest pain    Personal history of other specified conditions 05/03/2018    History of syncope    PTSD (post-traumatic stress disorder)     Seasonal asthma ([facility])     Seizure disorder (Multi)     Syncope     Vision loss    [2]   Past Surgical History:  Procedure Laterality Date    COLONOSCOPY      ESOPHAGOGASTRODUODENOSCOPY  05/20/2025    INNER EAR SURGERY  03/08/2018    Inner Ear  Surgery    MASS EXCISION  06/27/2025    OTHER SURGICAL HISTORY  04/05/2022    Oral surgery    OTHER SURGICAL HISTORY  08/06/2021    Esophagogastroduodenoscopy    OTHER SURGICAL HISTORY  08/06/2021    Colonoscopy    TONSILLECTOMY  03/08/2018    Tonsillectomy    UPPER GASTROINTESTINAL ENDOSCOPY     [3]   Family History  Problem Relation Name Age of Onset    Stroke Mother Carissa Daly     Asthma Mother Carissa Daly 10 - 19    Epilepsy Mother Carissakhadra Kauffman     Alcohol abuse Father Kristian Terrell     Alcohol abuse Brother Rolando Terrell     Other (cva) Other Kristian Terrell (Father)     Epilepsy Other Kristian Terrell (Father)     Ulcerative colitis Other Kristian Terrell (Father)     Hypertension Other Kristian Terrell (Father)    [4]   Allergies  Allergen Reactions    Cinnamon Anaphylaxis     Artificial cinnamon flavored products    Penicillin V Potassium Anaphylaxis    Penicillins Anaphylaxis, Swelling, Other and Unknown     throat swelling, SOB    Petrolatum,White Rash    Petroleum Jelly [White Petrolatum] Hives     Cold, burning sensation and welts

## 2025-08-08 NOTE — PREPROCEDURE INSTRUCTIONS
Medication List            Accurate as of August 8, 2025  8:18 AM. Always use your most recent med list.                ARIPiprazole 20 mg tablet  Commonly known as: Abilify  Medication Adjustments for Surgery: Take/Use as prescribed     bicalutamide 50 mg tablet  Commonly known as: Casodex  Take 1 tablet by mouth once daily  Medication Adjustments for Surgery: Take/Use as prescribed     bisacodyl 5 mg EC tablet  Commonly known as: Dulcolax  Take 2 tablets (10 mg) by mouth see administration instructions for 1 dose. See Preoperative Instructions document for detailed instructions.  Medication Adjustments for Surgery: Do Not take on the morning of surgery     cetirizine 10 mg tablet  Commonly known as: ZyrTEC  Take 1 tablet (10 mg) by mouth once daily. Allergy medication  Medication Adjustments for Surgery: Do Not take on the morning of surgery     chlorhexidine 4 % external liquid  Commonly known as: Hibiclens  Apply topically see administration instructions for 2 doses. See Preoperative Instructions document for detailed instructions.  Medication Adjustments for Surgery: Take/Use as prescribed     estradioL 1.25 mg/1.25 gram (0.1 %) gel in packet  Place 3.75 mg on the skin once daily. (3 packets)  Medication Adjustments for Surgery: Do Not take on the morning of surgery     hydrocortisone 25 mg suppository  Commonly known as: Anusol-HC  Insert 1 suppository (25 mg) into the rectum 2 times a day for 7 days.  Medication Adjustments for Surgery: Do Not take on the morning of surgery     hydrOXYzine pamoate 25 mg capsule  Commonly known as: Vistaril  Medication Adjustments for Surgery: Do Not take on the morning of surgery     lamoTRIgine 150 mg tablet  Commonly known as: LaMICtal  Medication Adjustments for Surgery: Take/Use as prescribed     levETIRAcetam 750 mg tablet  Commonly known as: Keppra  Medication Adjustments for Surgery: Take/Use as prescribed     multivitamin tablet  Additional Medication Adjustments  for Surgery: Take last dose 7 days before surgery     omeprazole 40 mg DR capsule  Commonly known as: PriLOSEC  TAKE 1 CAPSULE BY MOUTH TWICE A DAY BEFORE MEALS. DO NOT CRUSH OR CHEW  Medication Adjustments for Surgery: Take/Use as prescribed     prazosin 1 mg capsule  Commonly known as: Minipress  Medication Adjustments for Surgery: Take/Use as prescribed     progesterone 200 mg capsule  Commonly known as: Prometrium  Take 1 capsule (200 mg) by mouth once daily.  Medication Adjustments for Surgery: Do Not take on the morning of surgery     Proventil HFA 90 mcg/actuation inhaler  Generic drug: albuterol  Medication Adjustments for Surgery: Take/Use as prescribed     tiZANidine 2 mg tablet  Commonly known as: Zanaflex  TAKE 1 TABLET BY MOUTH TWICE A DAY AS NEEDED FOR MUSCLE SPASMS  Medication Adjustments for Surgery: Do Not take on the morning of surgery     traZODone 100 mg tablet  Commonly known as: Desyrel  Medication Adjustments for Surgery: Take/Use as prescribed     zonisamide 100 mg capsule  Commonly known as: Zonegran  Medication Adjustments for Surgery: Take/Use as prescribed                PRE-OPERATIVE INSTRUCTIONS FOR SURGERY    *Do not eat anything after midnight the night of surgery.  This includes food of any kind (including hard candy, cough drops, mints).     You may have up to 13 ounces of clear liquid until TWO hours prior to your scheduled arrival time  Clear liquids include water, black tea/coffee, (no milk or cream) apple juice and electrolyte drinks (GATORADE).  You may chew gum until TWO hours prior you your surgery/procedure.         -----------    *One of our staff members will call you ONE business day before your surgery, between 11am-2 pm to let you know the time to arrive.    If you have not received a call by 2 pm, call [phone]    *When you arrive at the hospital-->GO TO Registration on the ground floor    *Stop smoking 24 hours prior to surgery.  No Marijuana, CBD Oil or Vaping  for 48 hours    *No alcohol 24 hours prior to surgery    *You will need a responsible adult to drive you home      -You may be asked to remove your dentures, partial plate, eyeglasses or contact lenses before going to surgery.  Please bring a case for these items.    -Body piercings need to be removed.  Jewelry and valuables should be left at home.    -Put on loose,  comfortable, clean clothing, that will accommodate bandages    *If you have any further questions about your pre-op instructions,  not mentioned in this handout, then call [phone]*    What you may be asked to bring to surgery:  Insurance info and photo ID                           NPO Instructions:    Do not eat any food after midnight the night before your surgery/procedure.  You may have clear liquids until TWO hours before surgery/procedure. This includes water, black tea/coffee, (no milk or cream) apple juice and electrolyte drinks (Gatorade).    Additional Instructions:     Day of Surgery:  Review your medication instructions, take indicated medications  You may have clear liquids until TWO hours before surgery/procedure.  This includes water, black tea/coffee, (no milk or cream) apple juice and electrolyte drinks (Gatorade)  Wear  comfortable loose fitting clothing  Do not use moisturizers, creams, lotions or perfume  All jewelry and valuables should be left at home          Instructions provided for clear liquid diet, bowel prep, enema and preop shower and skin prep.

## 2025-08-19 ENCOUNTER — ANESTHESIA (OUTPATIENT)
Dept: OPERATING ROOM | Facility: HOSPITAL | Age: 35
End: 2025-08-19
Payer: COMMERCIAL

## 2025-08-19 ENCOUNTER — HOSPITAL ENCOUNTER (INPATIENT)
Facility: HOSPITAL | Age: 35
DRG: 876 | End: 2025-08-19
Attending: UROLOGY | Admitting: UROLOGY
Payer: COMMERCIAL

## 2025-08-19 ENCOUNTER — ANESTHESIA EVENT (OUTPATIENT)
Dept: OPERATING ROOM | Facility: HOSPITAL | Age: 35
End: 2025-08-19
Payer: COMMERCIAL

## 2025-08-19 DIAGNOSIS — G89.29 CHRONIC LEFT-SIDED LOW BACK PAIN WITH LEFT-SIDED SCIATICA: ICD-10-CM

## 2025-08-19 DIAGNOSIS — M54.42 CHRONIC LEFT-SIDED LOW BACK PAIN WITH LEFT-SIDED SCIATICA: ICD-10-CM

## 2025-08-19 DIAGNOSIS — F64.9 GENDER DYSPHORIA: Primary | ICD-10-CM

## 2025-08-19 LAB
ABO GROUP (TYPE) IN BLOOD: NORMAL
ABO GROUP (TYPE) IN BLOOD: NORMAL
ANTIBODY SCREEN: NORMAL
GLUCOSE BLD MANUAL STRIP-MCNC: 83 MG/DL (ref 74–99)
RH FACTOR (ANTIGEN D): NORMAL
RH FACTOR (ANTIGEN D): NORMAL

## 2025-08-19 PROCEDURE — 55150 REMOVAL OF SCROTUM: CPT | Performed by: UROLOGY

## 2025-08-19 PROCEDURE — 55150 REMOVAL OF SCROTUM: CPT | Performed by: OBSTETRICS & GYNECOLOGY

## 2025-08-19 PROCEDURE — 99222 1ST HOSP IP/OBS MODERATE 55: CPT | Performed by: INTERNAL MEDICINE

## 2025-08-19 PROCEDURE — 2720000007 HC OR 272 NO HCPCS: Performed by: UROLOGY

## 2025-08-19 PROCEDURE — 8E0WXCZ ROBOTIC ASSISTED PROCEDURE OF TRUNK REGION: ICD-10-PCS | Performed by: UROLOGY

## 2025-08-19 PROCEDURE — 15740 ISLAND PEDICLE FLAP GRAFT: CPT | Performed by: STUDENT IN AN ORGANIZED HEALTH CARE EDUCATION/TRAINING PROGRAM

## 2025-08-19 PROCEDURE — 14302 TIS TRNFR ADDL 30 SQ CM: CPT | Performed by: STUDENT IN AN ORGANIZED HEALTH CARE EDUCATION/TRAINING PROGRAM

## 2025-08-19 PROCEDURE — 56800 PLASTIC REPAIR INTROITUS: CPT | Performed by: STUDENT IN AN ORGANIZED HEALTH CARE EDUCATION/TRAINING PROGRAM

## 2025-08-19 PROCEDURE — 14301 TIS TRNFR ANY 30.1-60 SQ CM: CPT | Performed by: OBSTETRICS & GYNECOLOGY

## 2025-08-19 PROCEDURE — 0W4M070 CREATION OF VAGINA IN MALE PERINEUM WITH AUTOLOGOUS TISSUE SUBSTITUTE, OPEN APPROACH: ICD-10-PCS | Performed by: UROLOGY

## 2025-08-19 PROCEDURE — 56805 CLITOROPLASTY INTERSEX STATE: CPT | Performed by: OBSTETRICS & GYNECOLOGY

## 2025-08-19 PROCEDURE — 57425 LAPAROSCOPY SURG COLPOPEXY: CPT | Performed by: STUDENT IN AN ORGANIZED HEALTH CARE EDUCATION/TRAINING PROGRAM

## 2025-08-19 PROCEDURE — 7100000001 HC RECOVERY ROOM TIME - INITIAL BASE CHARGE: Performed by: UROLOGY

## 2025-08-19 PROCEDURE — 14301 TIS TRNFR ANY 30.1-60 SQ CM: CPT | Performed by: STUDENT IN AN ORGANIZED HEALTH CARE EDUCATION/TRAINING PROGRAM

## 2025-08-19 PROCEDURE — 3700000001 HC GENERAL ANESTHESIA TIME - INITIAL BASE CHARGE: Performed by: UROLOGY

## 2025-08-19 PROCEDURE — C1760 CLOSURE DEV, VASC: HCPCS | Performed by: UROLOGY

## 2025-08-19 PROCEDURE — 36415 COLL VENOUS BLD VENIPUNCTURE: CPT | Performed by: UROLOGY

## 2025-08-19 PROCEDURE — 2500000002 HC RX 250 W HCPCS SELF ADMINISTERED DRUGS (ALT 637 FOR MEDICARE OP, ALT 636 FOR OP/ED): Performed by: NURSE PRACTITIONER

## 2025-08-19 PROCEDURE — 57292 CONSTRUCT VAGINA WITH GRAFT: CPT | Performed by: UROLOGY

## 2025-08-19 PROCEDURE — 3700000002 HC GENERAL ANESTHESIA TIME - EACH INCREMENTAL 1 MINUTE: Performed by: UROLOGY

## 2025-08-19 PROCEDURE — 2500000004 HC RX 250 GENERAL PHARMACY W/ HCPCS (ALT 636 FOR OP/ED): Performed by: NURSE ANESTHETIST, CERTIFIED REGISTERED

## 2025-08-19 PROCEDURE — 54125 REMOVAL OF PENIS: CPT | Performed by: OBSTETRICS & GYNECOLOGY

## 2025-08-19 PROCEDURE — 54520 REMOVAL OF TESTIS: CPT | Performed by: UROLOGY

## 2025-08-19 PROCEDURE — 53430 RECONSTRUCTION OF URETHRA: CPT | Performed by: STUDENT IN AN ORGANIZED HEALTH CARE EDUCATION/TRAINING PROGRAM

## 2025-08-19 PROCEDURE — 56800 PLASTIC REPAIR INTROITUS: CPT | Performed by: OBSTETRICS & GYNECOLOGY

## 2025-08-19 PROCEDURE — 15734 MUSCLE-SKIN GRAFT TRUNK: CPT | Performed by: UROLOGY

## 2025-08-19 PROCEDURE — 14302 TIS TRNFR ADDL 30 SQ CM: CPT | Performed by: OBSTETRICS & GYNECOLOGY

## 2025-08-19 PROCEDURE — 54125 REMOVAL OF PENIS: CPT | Performed by: UROLOGY

## 2025-08-19 PROCEDURE — 2500000004 HC RX 250 GENERAL PHARMACY W/ HCPCS (ALT 636 FOR OP/ED): Performed by: STUDENT IN AN ORGANIZED HEALTH CARE EDUCATION/TRAINING PROGRAM

## 2025-08-19 PROCEDURE — 57425 LAPAROSCOPY SURG COLPOPEXY: CPT | Performed by: OBSTETRICS & GYNECOLOGY

## 2025-08-19 PROCEDURE — 3600000009 HC OR TIME - EACH INCREMENTAL 1 MINUTE - PROCEDURE LEVEL FOUR: Performed by: UROLOGY

## 2025-08-19 PROCEDURE — 57292 CONSTRUCT VAGINA WITH GRAFT: CPT | Performed by: OBSTETRICS & GYNECOLOGY

## 2025-08-19 PROCEDURE — 15734 MUSCLE-SKIN GRAFT TRUNK: CPT | Performed by: OBSTETRICS & GYNECOLOGY

## 2025-08-19 PROCEDURE — 2500000004 HC RX 250 GENERAL PHARMACY W/ HCPCS (ALT 636 FOR OP/ED): Performed by: UROLOGY

## 2025-08-19 PROCEDURE — 3600000004 HC OR TIME - INITIAL BASE CHARGE - PROCEDURE LEVEL FOUR: Performed by: UROLOGY

## 2025-08-19 PROCEDURE — 2500000004 HC RX 250 GENERAL PHARMACY W/ HCPCS (ALT 636 FOR OP/ED): Performed by: NURSE PRACTITIONER

## 2025-08-19 PROCEDURE — 2500000001 HC RX 250 WO HCPCS SELF ADMINISTERED DRUGS (ALT 637 FOR MEDICARE OP): Performed by: NURSE PRACTITIONER

## 2025-08-19 PROCEDURE — 53430 RECONSTRUCTION OF URETHRA: CPT | Performed by: UROLOGY

## 2025-08-19 PROCEDURE — 2500000001 HC RX 250 WO HCPCS SELF ADMINISTERED DRUGS (ALT 637 FOR MEDICARE OP): Performed by: UROLOGY

## 2025-08-19 PROCEDURE — 7100000002 HC RECOVERY ROOM TIME - EACH INCREMENTAL 1 MINUTE: Performed by: UROLOGY

## 2025-08-19 PROCEDURE — 2500000005 HC RX 250 GENERAL PHARMACY W/O HCPCS: Performed by: NURSE ANESTHETIST, CERTIFIED REGISTERED

## 2025-08-19 PROCEDURE — 56805 CLITOROPLASTY INTERSEX STATE: CPT | Performed by: UROLOGY

## 2025-08-19 PROCEDURE — 2500000005 HC RX 250 GENERAL PHARMACY W/O HCPCS: Performed by: NURSE PRACTITIONER

## 2025-08-19 PROCEDURE — 1100000001 HC PRIVATE ROOM DAILY

## 2025-08-19 PROCEDURE — 82947 ASSAY GLUCOSE BLOOD QUANT: CPT

## 2025-08-19 PROCEDURE — 86901 BLOOD TYPING SEROLOGIC RH(D): CPT | Performed by: UROLOGY

## 2025-08-19 RX ORDER — AMOXICILLIN 250 MG
2 CAPSULE ORAL 2 TIMES DAILY
Status: DISCONTINUED | OUTPATIENT
Start: 2025-08-19 | End: 2025-08-25 | Stop reason: HOSPADM

## 2025-08-19 RX ORDER — CELECOXIB 100 MG/1
200 CAPSULE ORAL ONCE
Status: COMPLETED | OUTPATIENT
Start: 2025-08-19 | End: 2025-08-19

## 2025-08-19 RX ORDER — POLYETHYLENE GLYCOL 3350 17 G/17G
17 POWDER, FOR SOLUTION ORAL DAILY
Status: DISCONTINUED | OUTPATIENT
Start: 2025-08-19 | End: 2025-08-25 | Stop reason: HOSPADM

## 2025-08-19 RX ORDER — CETIRIZINE HYDROCHLORIDE 10 MG/1
10 TABLET ORAL DAILY
Status: DISCONTINUED | OUTPATIENT
Start: 2025-08-19 | End: 2025-08-25 | Stop reason: HOSPADM

## 2025-08-19 RX ORDER — METOCLOPRAMIDE HYDROCHLORIDE 5 MG/ML
INJECTION INTRAMUSCULAR; INTRAVENOUS AS NEEDED
Status: DISCONTINUED | OUTPATIENT
Start: 2025-08-19 | End: 2025-08-19

## 2025-08-19 RX ORDER — LEVETIRACETAM 500 MG/1
1500 TABLET ORAL EVERY 12 HOURS
Status: DISCONTINUED | OUTPATIENT
Start: 2025-08-19 | End: 2025-08-25 | Stop reason: HOSPADM

## 2025-08-19 RX ORDER — ALBUTEROL SULFATE 90 UG/1
2 INHALANT RESPIRATORY (INHALATION) EVERY 2 HOUR PRN
Status: DISCONTINUED | OUTPATIENT
Start: 2025-08-19 | End: 2025-08-25 | Stop reason: HOSPADM

## 2025-08-19 RX ORDER — ONDANSETRON 4 MG/1
4 TABLET, FILM COATED ORAL EVERY 6 HOURS PRN
Status: DISCONTINUED | OUTPATIENT
Start: 2025-08-19 | End: 2025-08-25 | Stop reason: HOSPADM

## 2025-08-19 RX ORDER — SODIUM CHLORIDE 9 MG/ML
100 INJECTION, SOLUTION INTRAVENOUS CONTINUOUS
Status: DISCONTINUED | OUTPATIENT
Start: 2025-08-19 | End: 2025-08-20

## 2025-08-19 RX ORDER — HEPARIN SODIUM 5000 [USP'U]/ML
5000 INJECTION, SOLUTION INTRAVENOUS; SUBCUTANEOUS ONCE
Status: COMPLETED | OUTPATIENT
Start: 2025-08-19 | End: 2025-08-19

## 2025-08-19 RX ORDER — ZONISAMIDE 100 MG/1
200 CAPSULE ORAL NIGHTLY
Status: DISCONTINUED | OUTPATIENT
Start: 2025-08-19 | End: 2025-08-25 | Stop reason: HOSPADM

## 2025-08-19 RX ORDER — CIPROFLOXACIN 2 MG/ML
INJECTION, SOLUTION INTRAVENOUS AS NEEDED
Status: DISCONTINUED | OUTPATIENT
Start: 2025-08-19 | End: 2025-08-19

## 2025-08-19 RX ORDER — NORETHINDRONE AND ETHINYL ESTRADIOL 0.5-0.035
KIT ORAL AS NEEDED
Status: DISCONTINUED | OUTPATIENT
Start: 2025-08-19 | End: 2025-08-19

## 2025-08-19 RX ORDER — HYDROMORPHONE HYDROCHLORIDE 1 MG/ML
1 INJECTION, SOLUTION INTRAMUSCULAR; INTRAVENOUS; SUBCUTANEOUS EVERY 5 MIN PRN
Status: DISCONTINUED | OUTPATIENT
Start: 2025-08-19 | End: 2025-08-19 | Stop reason: HOSPADM

## 2025-08-19 RX ORDER — HYDROMORPHONE HYDROCHLORIDE 1 MG/ML
0.4 INJECTION, SOLUTION INTRAMUSCULAR; INTRAVENOUS; SUBCUTANEOUS EVERY 4 HOURS PRN
Status: DISCONTINUED | OUTPATIENT
Start: 2025-08-19 | End: 2025-08-20

## 2025-08-19 RX ORDER — TRAZODONE HYDROCHLORIDE 50 MG/1
100 TABLET ORAL NIGHTLY
Status: DISCONTINUED | OUTPATIENT
Start: 2025-08-19 | End: 2025-08-25 | Stop reason: HOSPADM

## 2025-08-19 RX ORDER — HYDRALAZINE HYDROCHLORIDE 20 MG/ML
10 INJECTION INTRAMUSCULAR; INTRAVENOUS EVERY 30 MIN PRN
Status: DISCONTINUED | OUTPATIENT
Start: 2025-08-19 | End: 2025-08-19 | Stop reason: HOSPADM

## 2025-08-19 RX ORDER — GABAPENTIN 300 MG/1
600 CAPSULE ORAL ONCE
Status: COMPLETED | OUTPATIENT
Start: 2025-08-19 | End: 2025-08-19

## 2025-08-19 RX ORDER — ARIPIPRAZOLE 10 MG/1
20 TABLET ORAL DAILY
Status: DISCONTINUED | OUTPATIENT
Start: 2025-08-20 | End: 2025-08-25 | Stop reason: HOSPADM

## 2025-08-19 RX ORDER — LIDOCAINE 560 MG/1
1 PATCH PERCUTANEOUS; TOPICAL; TRANSDERMAL EVERY 24 HOURS
Status: DISCONTINUED | OUTPATIENT
Start: 2025-08-19 | End: 2025-08-25 | Stop reason: HOSPADM

## 2025-08-19 RX ORDER — ROCURONIUM BROMIDE 10 MG/ML
INJECTION, SOLUTION INTRAVENOUS AS NEEDED
Status: DISCONTINUED | OUTPATIENT
Start: 2025-08-19 | End: 2025-08-19

## 2025-08-19 RX ORDER — ALBUTEROL SULFATE 90 UG/1
2 INHALANT RESPIRATORY (INHALATION) EVERY 6 HOURS PRN
Status: DISCONTINUED | OUTPATIENT
Start: 2025-08-19 | End: 2025-08-19

## 2025-08-19 RX ORDER — HYDROMORPHONE HYDROCHLORIDE 1 MG/ML
0.2 INJECTION, SOLUTION INTRAMUSCULAR; INTRAVENOUS; SUBCUTANEOUS EVERY 4 HOURS PRN
Status: DISCONTINUED | OUTPATIENT
Start: 2025-08-19 | End: 2025-08-20

## 2025-08-19 RX ORDER — ACETAMINOPHEN 325 MG/1
650 TABLET ORAL EVERY 4 HOURS PRN
Status: DISCONTINUED | OUTPATIENT
Start: 2025-08-19 | End: 2025-08-19 | Stop reason: HOSPADM

## 2025-08-19 RX ORDER — METRONIDAZOLE 500 MG/100ML
500 INJECTION, SOLUTION INTRAVENOUS ONCE
Status: COMPLETED | OUTPATIENT
Start: 2025-08-19 | End: 2025-08-19

## 2025-08-19 RX ORDER — LABETALOL HYDROCHLORIDE 5 MG/ML
5 INJECTION, SOLUTION INTRAVENOUS ONCE AS NEEDED
Status: DISCONTINUED | OUTPATIENT
Start: 2025-08-19 | End: 2025-08-19 | Stop reason: HOSPADM

## 2025-08-19 RX ORDER — PANTOPRAZOLE SODIUM 40 MG/1
40 TABLET, DELAYED RELEASE ORAL
Status: DISCONTINUED | OUTPATIENT
Start: 2025-08-19 | End: 2025-08-25 | Stop reason: HOSPADM

## 2025-08-19 RX ORDER — DEXMEDETOMIDINE HYDROCHLORIDE 100 UG/ML
INJECTION, SOLUTION INTRAVENOUS AS NEEDED
Status: DISCONTINUED | OUTPATIENT
Start: 2025-08-19 | End: 2025-08-19

## 2025-08-19 RX ORDER — DIPHENHYDRAMINE HYDROCHLORIDE 50 MG/ML
12.5 INJECTION, SOLUTION INTRAMUSCULAR; INTRAVENOUS ONCE AS NEEDED
Status: DISCONTINUED | OUTPATIENT
Start: 2025-08-19 | End: 2025-08-19 | Stop reason: HOSPADM

## 2025-08-19 RX ORDER — ONDANSETRON HYDROCHLORIDE 2 MG/ML
INJECTION, SOLUTION INTRAVENOUS AS NEEDED
Status: DISCONTINUED | OUTPATIENT
Start: 2025-08-19 | End: 2025-08-19

## 2025-08-19 RX ORDER — PHENYLEPHRINE HCL IN 0.9% NACL 1 MG/10 ML
SYRINGE (ML) INTRAVENOUS AS NEEDED
Status: DISCONTINUED | OUTPATIENT
Start: 2025-08-19 | End: 2025-08-19

## 2025-08-19 RX ORDER — ONDANSETRON HYDROCHLORIDE 2 MG/ML
4 INJECTION, SOLUTION INTRAVENOUS ONCE AS NEEDED
Status: DISCONTINUED | OUTPATIENT
Start: 2025-08-19 | End: 2025-08-19 | Stop reason: HOSPADM

## 2025-08-19 RX ORDER — LIDOCAINE HYDROCHLORIDE AND EPINEPHRINE 10; 10 UG/ML; MG/ML
INJECTION, SOLUTION INFILTRATION; PERINEURAL AS NEEDED
Status: DISCONTINUED | OUTPATIENT
Start: 2025-08-19 | End: 2025-08-19 | Stop reason: HOSPADM

## 2025-08-19 RX ORDER — FENTANYL CITRATE 50 UG/ML
INJECTION, SOLUTION INTRAMUSCULAR; INTRAVENOUS AS NEEDED
Status: DISCONTINUED | OUTPATIENT
Start: 2025-08-19 | End: 2025-08-19

## 2025-08-19 RX ORDER — ACETAMINOPHEN 325 MG/1
975 TABLET ORAL ONCE
Status: COMPLETED | OUTPATIENT
Start: 2025-08-19 | End: 2025-08-19

## 2025-08-19 RX ORDER — ONDANSETRON HYDROCHLORIDE 2 MG/ML
4 INJECTION, SOLUTION INTRAVENOUS EVERY 6 HOURS PRN
Status: DISCONTINUED | OUTPATIENT
Start: 2025-08-19 | End: 2025-08-25 | Stop reason: HOSPADM

## 2025-08-19 RX ORDER — HYDROXYZINE HYDROCHLORIDE 25 MG/1
25 TABLET, FILM COATED ORAL 3 TIMES DAILY PRN
Status: DISCONTINUED | OUTPATIENT
Start: 2025-08-19 | End: 2025-08-25 | Stop reason: HOSPADM

## 2025-08-19 RX ORDER — LIDOCAINE HCL/PF 100 MG/5ML
SYRINGE (ML) INTRAVENOUS AS NEEDED
Status: DISCONTINUED | OUTPATIENT
Start: 2025-08-19 | End: 2025-08-19

## 2025-08-19 RX ORDER — SIMETHICONE 80 MG
160 TABLET,CHEWABLE ORAL 3 TIMES DAILY PRN
Status: DISCONTINUED | OUTPATIENT
Start: 2025-08-19 | End: 2025-08-25 | Stop reason: HOSPADM

## 2025-08-19 RX ORDER — ENOXAPARIN SODIUM 100 MG/ML
40 INJECTION SUBCUTANEOUS EVERY 24 HOURS
Status: DISCONTINUED | OUTPATIENT
Start: 2025-08-19 | End: 2025-08-25 | Stop reason: HOSPADM

## 2025-08-19 RX ORDER — OXYBUTYNIN CHLORIDE 5 MG/1
5 TABLET ORAL 3 TIMES DAILY
Status: DISCONTINUED | OUTPATIENT
Start: 2025-08-19 | End: 2025-08-25 | Stop reason: HOSPADM

## 2025-08-19 RX ORDER — PROPOFOL 10 MG/ML
INJECTION, EMULSION INTRAVENOUS AS NEEDED
Status: DISCONTINUED | OUTPATIENT
Start: 2025-08-19 | End: 2025-08-19

## 2025-08-19 RX ORDER — PRAZOSIN HYDROCHLORIDE 2 MG/1
2 CAPSULE ORAL NIGHTLY
Status: DISCONTINUED | OUTPATIENT
Start: 2025-08-19 | End: 2025-08-25 | Stop reason: HOSPADM

## 2025-08-19 RX ORDER — BUPIVACAINE HYDROCHLORIDE 2.5 MG/ML
INJECTION, SOLUTION INFILTRATION; PERINEURAL AS NEEDED
Status: DISCONTINUED | OUTPATIENT
Start: 2025-08-19 | End: 2025-08-19 | Stop reason: HOSPADM

## 2025-08-19 RX ORDER — METHOCARBAMOL 500 MG/1
500 TABLET, FILM COATED ORAL EVERY 12 HOURS
Status: DISCONTINUED | OUTPATIENT
Start: 2025-08-19 | End: 2025-08-25 | Stop reason: HOSPADM

## 2025-08-19 RX ORDER — TRANEXAMIC ACID 10 MG/ML
INJECTION, SOLUTION INTRAVENOUS AS NEEDED
Status: DISCONTINUED | OUTPATIENT
Start: 2025-08-19 | End: 2025-08-19

## 2025-08-19 RX ORDER — MIDAZOLAM HYDROCHLORIDE 1 MG/ML
INJECTION, SOLUTION INTRAMUSCULAR; INTRAVENOUS AS NEEDED
Status: DISCONTINUED | OUTPATIENT
Start: 2025-08-19 | End: 2025-08-19

## 2025-08-19 RX ORDER — PROGESTERONE 200 MG/1
200 CAPSULE ORAL DAILY
Status: DISCONTINUED | OUTPATIENT
Start: 2025-08-19 | End: 2025-08-25 | Stop reason: HOSPADM

## 2025-08-19 RX ORDER — SODIUM CHLORIDE, SODIUM LACTATE, POTASSIUM CHLORIDE, CALCIUM CHLORIDE 600; 310; 30; 20 MG/100ML; MG/100ML; MG/100ML; MG/100ML
100 INJECTION, SOLUTION INTRAVENOUS CONTINUOUS
Status: DISCONTINUED | OUTPATIENT
Start: 2025-08-19 | End: 2025-08-19 | Stop reason: HOSPADM

## 2025-08-19 RX ORDER — CIPROFLOXACIN 2 MG/ML
INJECTION, SOLUTION INTRAVENOUS
Status: COMPLETED
Start: 2025-08-19 | End: 2025-08-19

## 2025-08-19 RX ORDER — ACETAMINOPHEN 325 MG/1
975 TABLET ORAL EVERY 6 HOURS
Status: DISCONTINUED | OUTPATIENT
Start: 2025-08-19 | End: 2025-08-25 | Stop reason: HOSPADM

## 2025-08-19 RX ORDER — CIPROFLOXACIN 2 MG/ML
400 INJECTION, SOLUTION INTRAVENOUS EVERY 12 HOURS
Status: DISCONTINUED | OUTPATIENT
Start: 2025-08-19 | End: 2025-08-22

## 2025-08-19 RX ADMIN — CIPROFLOXACIN 400 MG: 2 INJECTION, SOLUTION INTRAVENOUS at 19:44

## 2025-08-19 RX ADMIN — OXYBUTYNIN CHLORIDE 5 MG: 5 TABLET ORAL at 20:30

## 2025-08-19 RX ADMIN — SODIUM CHLORIDE, POTASSIUM CHLORIDE, SODIUM LACTATE AND CALCIUM CHLORIDE: 600; 310; 30; 20 INJECTION, SOLUTION INTRAVENOUS at 11:41

## 2025-08-19 RX ADMIN — ROCURONIUM BROMIDE 30 MG: 10 INJECTION, SOLUTION INTRAVENOUS at 09:00

## 2025-08-19 RX ADMIN — SUGAMMADEX 50 MG: 100 INJECTION, SOLUTION INTRAVENOUS at 12:55

## 2025-08-19 RX ADMIN — DEXMEDETOMIDINE 20 MCG: 200 INJECTION, SOLUTION INTRAVENOUS at 07:36

## 2025-08-19 RX ADMIN — EPHEDRINE SULFATE 10 MG: 50 INJECTION, SOLUTION INTRAVENOUS at 12:18

## 2025-08-19 RX ADMIN — HYDROMORPHONE HYDROCHLORIDE 1 MG: 2 INJECTION, SOLUTION INTRAMUSCULAR; INTRAVENOUS; SUBCUTANEOUS at 09:55

## 2025-08-19 RX ADMIN — SODIUM CHLORIDE 100 ML/HR: 0.9 INJECTION, SOLUTION INTRAVENOUS at 16:28

## 2025-08-19 RX ADMIN — SODIUM CHLORIDE, POTASSIUM CHLORIDE, SODIUM LACTATE AND CALCIUM CHLORIDE: 600; 310; 30; 20 INJECTION, SOLUTION INTRAVENOUS at 07:30

## 2025-08-19 RX ADMIN — DEXMEDETOMIDINE 12 MCG: 200 INJECTION, SOLUTION INTRAVENOUS at 09:56

## 2025-08-19 RX ADMIN — EPHEDRINE SULFATE 10 MG: 50 INJECTION, SOLUTION INTRAVENOUS at 09:45

## 2025-08-19 RX ADMIN — ZONISAMIDE 200 MG: 100 CAPSULE ORAL at 20:30

## 2025-08-19 RX ADMIN — FENTANYL CITRATE 50 MCG: 50 INJECTION, SOLUTION INTRAMUSCULAR; INTRAVENOUS at 07:36

## 2025-08-19 RX ADMIN — ONDANSETRON 4 MG: 2 INJECTION INTRAMUSCULAR; INTRAVENOUS at 12:00

## 2025-08-19 RX ADMIN — DEXAMETHASONE SODIUM PHOSPHATE 10 MG: 4 INJECTION, SOLUTION INTRAMUSCULAR; INTRAVENOUS at 08:00

## 2025-08-19 RX ADMIN — ROCURONIUM BROMIDE 40 MG: 10 INJECTION, SOLUTION INTRAVENOUS at 08:15

## 2025-08-19 RX ADMIN — PROGESTERONE 200 MG: 200 CAPSULE ORAL at 20:30

## 2025-08-19 RX ADMIN — PROPOFOL 200 MG: 10 INJECTION, EMULSION INTRAVENOUS at 07:36

## 2025-08-19 RX ADMIN — Medication 100 MCG: at 09:27

## 2025-08-19 RX ADMIN — OXYBUTYNIN CHLORIDE 5 MG: 5 TABLET ORAL at 16:15

## 2025-08-19 RX ADMIN — CIPROFLOXACIN 400 MG: 400 INJECTION, SOLUTION INTRAVENOUS at 07:45

## 2025-08-19 RX ADMIN — LIDOCAINE HYDROCHLORIDE 60 MG: 20 INJECTION INTRAVENOUS at 07:36

## 2025-08-19 RX ADMIN — DEXMEDETOMIDINE 20 MCG: 200 INJECTION, SOLUTION INTRAVENOUS at 08:15

## 2025-08-19 RX ADMIN — LIDOCAINE 1 PATCH: 4 PATCH TOPICAL at 16:14

## 2025-08-19 RX ADMIN — MIDAZOLAM 2 MG: 1 INJECTION INTRAMUSCULAR; INTRAVENOUS at 07:33

## 2025-08-19 RX ADMIN — SENNOSIDES AND DOCUSATE SODIUM 2 TABLET: 50; 8.6 TABLET ORAL at 20:30

## 2025-08-19 RX ADMIN — SUGAMMADEX 50 MG: 100 INJECTION, SOLUTION INTRAVENOUS at 12:45

## 2025-08-19 RX ADMIN — DEXMEDETOMIDINE 12 MCG: 200 INJECTION, SOLUTION INTRAVENOUS at 13:22

## 2025-08-19 RX ADMIN — ACETAMINOPHEN 975 MG: 325 TABLET ORAL at 06:42

## 2025-08-19 RX ADMIN — GABAPENTIN 600 MG: 300 CAPSULE ORAL at 06:42

## 2025-08-19 RX ADMIN — METOCLOPRAMIDE 10 MG: 5 INJECTION, SOLUTION INTRAMUSCULAR; INTRAVENOUS at 09:00

## 2025-08-19 RX ADMIN — LEVETIRACETAM 1500 MG: 500 TABLET, FILM COATED ORAL at 20:29

## 2025-08-19 RX ADMIN — TRANEXAMIC ACID 1000 MG: 10 INJECTION, SOLUTION INTRAVENOUS at 11:45

## 2025-08-19 RX ADMIN — ROCURONIUM BROMIDE 20 MG: 10 INJECTION, SOLUTION INTRAVENOUS at 09:45

## 2025-08-19 RX ADMIN — DEXMEDETOMIDINE 8 MCG: 200 INJECTION, SOLUTION INTRAVENOUS at 12:20

## 2025-08-19 RX ADMIN — ONDANSETRON 4 MG: 2 INJECTION INTRAMUSCULAR; INTRAVENOUS at 20:32

## 2025-08-19 RX ADMIN — SUGAMMADEX 50 MG: 100 INJECTION, SOLUTION INTRAVENOUS at 13:10

## 2025-08-19 RX ADMIN — SUGAMMADEX 50 MG: 100 INJECTION, SOLUTION INTRAVENOUS at 12:40

## 2025-08-19 RX ADMIN — ROCURONIUM BROMIDE 20 MG: 10 INJECTION, SOLUTION INTRAVENOUS at 10:45

## 2025-08-19 RX ADMIN — GENTAMICIN SULFATE 500 MG: 40 INJECTION, SOLUTION INTRAMUSCULAR; INTRAVENOUS at 07:45

## 2025-08-19 RX ADMIN — FENTANYL CITRATE 50 MCG: 50 INJECTION, SOLUTION INTRAMUSCULAR; INTRAVENOUS at 08:15

## 2025-08-19 RX ADMIN — HYDROMORPHONE HYDROCHLORIDE 0.4 MG: 1 INJECTION, SOLUTION INTRAMUSCULAR; INTRAVENOUS; SUBCUTANEOUS at 20:30

## 2025-08-19 RX ADMIN — METHOCARBAMOL 500 MG: 500 TABLET ORAL at 16:15

## 2025-08-19 RX ADMIN — HEPARIN SODIUM 5000 UNITS: 5000 INJECTION, SOLUTION INTRAVENOUS; SUBCUTANEOUS at 06:42

## 2025-08-19 RX ADMIN — ACETAMINOPHEN 975 MG: 325 TABLET ORAL at 22:27

## 2025-08-19 RX ADMIN — PANTOPRAZOLE SODIUM 40 MG: 40 TABLET, DELAYED RELEASE ORAL at 16:15

## 2025-08-19 RX ADMIN — CELECOXIB 200 MG: 100 CAPSULE ORAL at 06:42

## 2025-08-19 RX ADMIN — ROCURONIUM BROMIDE 60 MG: 10 INJECTION, SOLUTION INTRAVENOUS at 07:36

## 2025-08-19 RX ADMIN — TRANEXAMIC ACID 1000 MG: 10 INJECTION, SOLUTION INTRAVENOUS at 08:00

## 2025-08-19 RX ADMIN — METRONIDAZOLE 500 MG: 500 SOLUTION INTRAVENOUS at 07:45

## 2025-08-19 RX ADMIN — LAMOTRIGINE 150 MG: 100 TABLET ORAL at 20:29

## 2025-08-19 RX ADMIN — HYDROMORPHONE HYDROCHLORIDE 1 MG: 2 INJECTION, SOLUTION INTRAMUSCULAR; INTRAVENOUS; SUBCUTANEOUS at 11:45

## 2025-08-19 RX ADMIN — DEXMEDETOMIDINE 8 MCG: 200 INJECTION, SOLUTION INTRAVENOUS at 13:00

## 2025-08-19 RX ADMIN — ACETAMINOPHEN 975 MG: 325 TABLET ORAL at 16:14

## 2025-08-19 RX ADMIN — POLYETHYLENE GLYCOL 3350 17 G: 17 POWDER, FOR SOLUTION ORAL at 16:14

## 2025-08-19 SDOH — SOCIAL STABILITY: SOCIAL INSECURITY: HAS ANYONE EVER THREATENED TO HURT YOUR FAMILY OR YOUR PETS?: NO

## 2025-08-19 SDOH — SOCIAL STABILITY: SOCIAL INSECURITY: ARE THERE ANY APPARENT SIGNS OF INJURIES/BEHAVIORS THAT COULD BE RELATED TO ABUSE/NEGLECT?: NO

## 2025-08-19 SDOH — SOCIAL STABILITY: SOCIAL INSECURITY: HAVE YOU HAD THOUGHTS OF HARMING ANYONE ELSE?: NO

## 2025-08-19 SDOH — HEALTH STABILITY: MENTAL HEALTH: CURRENT SMOKER: 0

## 2025-08-19 SDOH — SOCIAL STABILITY: SOCIAL INSECURITY: HAVE YOU HAD ANY THOUGHTS OF HARMING ANYONE ELSE?: NO

## 2025-08-19 SDOH — SOCIAL STABILITY: SOCIAL INSECURITY
WITHIN THE LAST YEAR, HAVE YOU BEEN RAPED OR FORCED TO HAVE ANY KIND OF SEXUAL ACTIVITY BY YOUR PARTNER OR EX-PARTNER?: NO

## 2025-08-19 SDOH — ECONOMIC STABILITY: HOUSING INSECURITY: DO YOU FEEL UNSAFE GOING BACK TO THE PLACE WHERE YOU LIVE?: NO

## 2025-08-19 SDOH — SOCIAL STABILITY: SOCIAL INSECURITY: DOES ANYONE TRY TO KEEP YOU FROM HAVING/CONTACTING OTHER FRIENDS OR DOING THINGS OUTSIDE YOUR HOME?: NO

## 2025-08-19 SDOH — SOCIAL STABILITY: SOCIAL INSECURITY: ABUSE: ADULT

## 2025-08-19 SDOH — SOCIAL STABILITY: SOCIAL INSECURITY: WITHIN THE LAST YEAR, HAVE YOU BEEN HUMILIATED OR EMOTIONALLY ABUSED IN OTHER WAYS BY YOUR PARTNER OR EX-PARTNER?: NO

## 2025-08-19 SDOH — SOCIAL STABILITY: SOCIAL INSECURITY
WITHIN THE LAST YEAR, HAVE YOU BEEN KICKED, HIT, SLAPPED, OR OTHERWISE PHYSICALLY HURT BY YOUR PARTNER OR EX-PARTNER?: NO

## 2025-08-19 SDOH — SOCIAL STABILITY: SOCIAL INSECURITY
ASK PARENT OR GUARDIAN: ARE THERE TIMES WHEN YOU, YOUR CHILD(REN), OR ANY MEMBER OF YOUR HOUSEHOLD FEEL UNSAFE, HARMED, OR THREATENED AROUND PERSONS WITH WHOM YOU KNOW OR LIVE?: NO

## 2025-08-19 SDOH — ECONOMIC STABILITY: FOOD INSECURITY: WITHIN THE PAST 12 MONTHS, THE FOOD YOU BOUGHT JUST DIDN'T LAST AND YOU DIDN'T HAVE MONEY TO GET MORE.: NEVER TRUE

## 2025-08-19 SDOH — ECONOMIC STABILITY: FOOD INSECURITY: WITHIN THE PAST 12 MONTHS, YOU WORRIED THAT YOUR FOOD WOULD RUN OUT BEFORE YOU GOT THE MONEY TO BUY MORE.: NEVER TRUE

## 2025-08-19 SDOH — ECONOMIC STABILITY: INCOME INSECURITY: IN THE PAST 12 MONTHS HAS THE ELECTRIC, GAS, OIL, OR WATER COMPANY THREATENED TO SHUT OFF SERVICES IN YOUR HOME?: NO

## 2025-08-19 SDOH — SOCIAL STABILITY: SOCIAL INSECURITY: WITHIN THE LAST YEAR, HAVE YOU BEEN AFRAID OF YOUR PARTNER OR EX-PARTNER?: NO

## 2025-08-19 SDOH — SOCIAL STABILITY: SOCIAL INSECURITY: WERE YOU ABLE TO COMPLETE ALL THE BEHAVIORAL HEALTH SCREENINGS?: YES

## 2025-08-19 SDOH — SOCIAL STABILITY: SOCIAL INSECURITY: DO YOU FEEL ANYONE HAS EXPLOITED OR TAKEN ADVANTAGE OF YOU FINANCIALLY OR OF YOUR PERSONAL PROPERTY?: NO

## 2025-08-19 SDOH — SOCIAL STABILITY: SOCIAL INSECURITY: DO YOU FEEL UNSAFE GOING BACK TO THE PLACE WHERE YOU ARE LIVING?: NO

## 2025-08-19 SDOH — SOCIAL STABILITY: SOCIAL INSECURITY: ARE YOU OR HAVE YOU BEEN THREATENED OR ABUSED PHYSICALLY, EMOTIONALLY, OR SEXUALLY BY ANYONE?: NO

## 2025-08-19 ASSESSMENT — ENCOUNTER SYMPTOMS
DIARRHEA: 0
DIZZINESS: 0
DYSURIA: 0
COUGH: 0
SHORTNESS OF BREATH: 0
PALPITATIONS: 0
VOMITING: 0
RHINORRHEA: 0
CHILLS: 0
ARTHRALGIAS: 0
NAUSEA: 0
CONFUSION: 0
SORE THROAT: 0
MYALGIAS: 0
HEMATURIA: 0
FEVER: 0
CONSTIPATION: 0
WOUND: 0
HALLUCINATIONS: 0

## 2025-08-19 ASSESSMENT — ACTIVITIES OF DAILY LIVING (ADL)
BATHING: INDEPENDENT
HEARING - RIGHT EAR: FUNCTIONAL
JUDGMENT_ADEQUATE_SAFELY_COMPLETE_DAILY_ACTIVITIES: YES
ADEQUATE_TO_COMPLETE_ADL: YES
HEARING - LEFT EAR: FUNCTIONAL
WALKS IN HOME: INDEPENDENT
GROOMING: INDEPENDENT
DRESSING YOURSELF: INDEPENDENT
PATIENT'S MEMORY ADEQUATE TO SAFELY COMPLETE DAILY ACTIVITIES?: YES
TOILETING: NEEDS ASSISTANCE
LACK_OF_TRANSPORTATION: NO
FEEDING YOURSELF: INDEPENDENT

## 2025-08-19 ASSESSMENT — LIFESTYLE VARIABLES
PRESCIPTION_ABUSE_PAST_12_MONTHS: NO
HOW OFTEN DO YOU HAVE A DRINK CONTAINING ALCOHOL: NEVER
AUDIT-C TOTAL SCORE: 0
SUBSTANCE_ABUSE_PAST_12_MONTHS: YES
HOW OFTEN DO YOU HAVE 6 OR MORE DRINKS ON ONE OCCASION: NEVER
SKIP TO QUESTIONS 9-10: 1
AUDIT-C TOTAL SCORE: 0
HOW MANY STANDARD DRINKS CONTAINING ALCOHOL DO YOU HAVE ON A TYPICAL DAY: PATIENT DOES NOT DRINK

## 2025-08-19 ASSESSMENT — COGNITIVE AND FUNCTIONAL STATUS - GENERAL
MOVING FROM LYING ON BACK TO SITTING ON SIDE OF FLAT BED WITH BEDRAILS: A LITTLE
DRESSING REGULAR UPPER BODY CLOTHING: A LITTLE
MOBILITY SCORE: 18
HELP NEEDED FOR BATHING: A LITTLE
TOILETING: A LITTLE
PATIENT BASELINE BEDBOUND: NO
CLIMB 3 TO 5 STEPS WITH RAILING: A LITTLE
WALKING IN HOSPITAL ROOM: A LITTLE
MOVING TO AND FROM BED TO CHAIR: A LITTLE
TURNING FROM BACK TO SIDE WHILE IN FLAT BAD: A LITTLE
DAILY ACTIVITIY SCORE: 20
STANDING UP FROM CHAIR USING ARMS: A LITTLE
DRESSING REGULAR LOWER BODY CLOTHING: A LITTLE

## 2025-08-19 ASSESSMENT — PAIN SCALES - GENERAL
PAINLEVEL_OUTOF10: 3
PAINLEVEL_OUTOF10: 0 - NO PAIN
PAINLEVEL_OUTOF10: 3
PAINLEVEL_OUTOF10: 3
PAINLEVEL_OUTOF10: 8
PAIN_LEVEL: 0
PAINLEVEL_OUTOF10: 3
PAINLEVEL_OUTOF10: 3

## 2025-08-19 ASSESSMENT — PAIN - FUNCTIONAL ASSESSMENT
PAIN_FUNCTIONAL_ASSESSMENT: 0-10
PAIN_FUNCTIONAL_ASSESSMENT: 0-10
PAIN_FUNCTIONAL_ASSESSMENT: VAS (VISUAL ANALOG SCALE)
PAIN_FUNCTIONAL_ASSESSMENT: 0-10

## 2025-08-19 ASSESSMENT — PAIN DESCRIPTION - DESCRIPTORS
DESCRIPTORS: ACHING;DISCOMFORT;PRESSURE;BURNING
DESCRIPTORS: TENDER

## 2025-08-19 ASSESSMENT — PAIN DESCRIPTION - LOCATION: LOCATION: VAGINA

## 2025-08-20 LAB
ANION GAP SERPL CALC-SCNC: 10 MMOL/L (ref 10–20)
BUN SERPL-MCNC: 8 MG/DL (ref 6–23)
CALCIUM SERPL-MCNC: 8.6 MG/DL (ref 8.6–10.3)
CHLORIDE SERPL-SCNC: 107 MMOL/L (ref 98–107)
CO2 SERPL-SCNC: 24 MMOL/L (ref 21–32)
CREAT SERPL-MCNC: 1.13 MG/DL (ref 0.5–1.3)
EGFRCR SERPLBLD CKD-EPI 2021: 65 ML/MIN/1.73M*2
ERYTHROCYTE [DISTWIDTH] IN BLOOD BY AUTOMATED COUNT: 12.5 % (ref 11.5–14.5)
GLUCOSE SERPL-MCNC: 93 MG/DL (ref 74–99)
HCT VFR BLD AUTO: 36.3 % (ref 36–52)
HGB BLD-MCNC: 11.9 G/DL (ref 12–17.5)
MCH RBC QN AUTO: 30.2 PG (ref 26–34)
MCHC RBC AUTO-ENTMCNC: 32.8 G/DL (ref 32–36)
MCV RBC AUTO: 92 FL (ref 80–100)
NRBC BLD-RTO: 0 /100 WBCS (ref 0–0)
PLATELET # BLD AUTO: 177 X10*3/UL (ref 150–450)
POTASSIUM SERPL-SCNC: 3.6 MMOL/L (ref 3.5–5.3)
RBC # BLD AUTO: 3.94 X10*6/UL (ref 4–5.9)
SODIUM SERPL-SCNC: 137 MMOL/L (ref 136–145)
WBC # BLD AUTO: 12.9 X10*3/UL (ref 4.4–11.3)

## 2025-08-20 PROCEDURE — 36415 COLL VENOUS BLD VENIPUNCTURE: CPT | Performed by: NURSE PRACTITIONER

## 2025-08-20 PROCEDURE — 85027 COMPLETE CBC AUTOMATED: CPT | Performed by: NURSE PRACTITIONER

## 2025-08-20 PROCEDURE — 2500000005 HC RX 250 GENERAL PHARMACY W/O HCPCS: Performed by: NURSE PRACTITIONER

## 2025-08-20 PROCEDURE — 2500000001 HC RX 250 WO HCPCS SELF ADMINISTERED DRUGS (ALT 637 FOR MEDICARE OP): Performed by: NURSE PRACTITIONER

## 2025-08-20 PROCEDURE — 2500000004 HC RX 250 GENERAL PHARMACY W/ HCPCS (ALT 636 FOR OP/ED): Performed by: NURSE PRACTITIONER

## 2025-08-20 PROCEDURE — 99024 POSTOP FOLLOW-UP VISIT: CPT | Performed by: NURSE PRACTITIONER

## 2025-08-20 PROCEDURE — 80048 BASIC METABOLIC PNL TOTAL CA: CPT | Performed by: NURSE PRACTITIONER

## 2025-08-20 PROCEDURE — 99232 SBSQ HOSP IP/OBS MODERATE 35: CPT | Performed by: INTERNAL MEDICINE

## 2025-08-20 PROCEDURE — 2500000002 HC RX 250 W HCPCS SELF ADMINISTERED DRUGS (ALT 637 FOR MEDICARE OP, ALT 636 FOR OP/ED): Performed by: NURSE PRACTITIONER

## 2025-08-20 PROCEDURE — 1100000001 HC PRIVATE ROOM DAILY

## 2025-08-20 RX ORDER — OXYCODONE HYDROCHLORIDE 5 MG/1
10 TABLET ORAL EVERY 4 HOURS PRN
Refills: 0 | Status: DISCONTINUED | OUTPATIENT
Start: 2025-08-20 | End: 2025-08-25 | Stop reason: HOSPADM

## 2025-08-20 RX ORDER — OXYCODONE HYDROCHLORIDE 5 MG/1
5 TABLET ORAL EVERY 4 HOURS PRN
Refills: 0 | Status: DISCONTINUED | OUTPATIENT
Start: 2025-08-20 | End: 2025-08-25 | Stop reason: HOSPADM

## 2025-08-20 RX ADMIN — LIDOCAINE 1 PATCH: 4 PATCH TOPICAL at 15:53

## 2025-08-20 RX ADMIN — ACETAMINOPHEN 975 MG: 325 TABLET ORAL at 09:21

## 2025-08-20 RX ADMIN — PANTOPRAZOLE SODIUM 40 MG: 40 TABLET, DELAYED RELEASE ORAL at 06:35

## 2025-08-20 RX ADMIN — OXYBUTYNIN CHLORIDE 5 MG: 5 TABLET ORAL at 14:11

## 2025-08-20 RX ADMIN — LAMOTRIGINE 150 MG: 100 TABLET ORAL at 20:33

## 2025-08-20 RX ADMIN — HYDROMORPHONE HYDROCHLORIDE 0.4 MG: 1 INJECTION, SOLUTION INTRAMUSCULAR; INTRAVENOUS; SUBCUTANEOUS at 04:43

## 2025-08-20 RX ADMIN — PANTOPRAZOLE SODIUM 40 MG: 40 TABLET, DELAYED RELEASE ORAL at 15:53

## 2025-08-20 RX ADMIN — LAMOTRIGINE 150 MG: 100 TABLET ORAL at 09:59

## 2025-08-20 RX ADMIN — CIPROFLOXACIN 400 MG: 2 INJECTION, SOLUTION INTRAVENOUS at 20:31

## 2025-08-20 RX ADMIN — POLYETHYLENE GLYCOL 3350 17 G: 17 POWDER, FOR SOLUTION ORAL at 08:35

## 2025-08-20 RX ADMIN — ACETAMINOPHEN 975 MG: 325 TABLET ORAL at 04:28

## 2025-08-20 RX ADMIN — ENOXAPARIN SODIUM 40 MG: 100 INJECTION SUBCUTANEOUS at 09:21

## 2025-08-20 RX ADMIN — PROGESTERONE 200 MG: 200 CAPSULE ORAL at 20:32

## 2025-08-20 RX ADMIN — OXYBUTYNIN CHLORIDE 5 MG: 5 TABLET ORAL at 08:36

## 2025-08-20 RX ADMIN — OXYCODONE HYDROCHLORIDE 10 MG: 5 TABLET ORAL at 20:30

## 2025-08-20 RX ADMIN — METHOCARBAMOL 500 MG: 500 TABLET ORAL at 15:53

## 2025-08-20 RX ADMIN — LEVETIRACETAM 1500 MG: 500 TABLET, FILM COATED ORAL at 08:36

## 2025-08-20 RX ADMIN — SENNOSIDES AND DOCUSATE SODIUM 2 TABLET: 50; 8.6 TABLET ORAL at 20:32

## 2025-08-20 RX ADMIN — CETIRIZINE HYDROCHLORIDE 10 MG: 10 TABLET, FILM COATED ORAL at 08:36

## 2025-08-20 RX ADMIN — OXYCODONE HYDROCHLORIDE 10 MG: 5 TABLET ORAL at 12:08

## 2025-08-20 RX ADMIN — CIPROFLOXACIN 400 MG: 2 INJECTION, SOLUTION INTRAVENOUS at 08:35

## 2025-08-20 RX ADMIN — SODIUM CHLORIDE 100 ML/HR: 0.9 INJECTION, SOLUTION INTRAVENOUS at 01:01

## 2025-08-20 RX ADMIN — LEVETIRACETAM 1500 MG: 500 TABLET, FILM COATED ORAL at 20:32

## 2025-08-20 RX ADMIN — SENNOSIDES AND DOCUSATE SODIUM 2 TABLET: 50; 8.6 TABLET ORAL at 08:35

## 2025-08-20 RX ADMIN — ACETAMINOPHEN 975 MG: 325 TABLET ORAL at 22:06

## 2025-08-20 RX ADMIN — ZONISAMIDE 200 MG: 100 CAPSULE ORAL at 20:34

## 2025-08-20 RX ADMIN — METHOCARBAMOL 500 MG: 500 TABLET ORAL at 04:28

## 2025-08-20 RX ADMIN — OXYBUTYNIN CHLORIDE 5 MG: 5 TABLET ORAL at 20:32

## 2025-08-20 RX ADMIN — ARIPIPRAZOLE 20 MG: 10 TABLET ORAL at 08:36

## 2025-08-20 RX ADMIN — TRAZODONE HYDROCHLORIDE 100 MG: 50 TABLET ORAL at 20:32

## 2025-08-20 RX ADMIN — PRAZOSIN HYDROCHLORIDE 2 MG: 2 CAPSULE ORAL at 20:33

## 2025-08-20 SDOH — ECONOMIC STABILITY: TRANSPORTATION INSECURITY
IN THE PAST 12 MONTHS, HAS LACK OF TRANSPORTATION KEPT YOU FROM MEETINGS, WORK, OR FROM GETTING THINGS NEEDED FOR DAILY LIVING?: NO

## 2025-08-20 SDOH — ECONOMIC STABILITY: FOOD INSECURITY
WITHIN THE PAST 12 MONTHS, YOU WORRIED THAT YOUR FOOD WOULD RUN OUT BEFORE YOU GOT THE MONEY TO BUY MORE.: SOMETIMES TRUE

## 2025-08-20 SDOH — ECONOMIC STABILITY: FOOD INSECURITY: WITHIN THE PAST 12 MONTHS, YOU WORRIED THAT YOUR FOOD WOULD RUN OUT BEFORE YOU GOT MONEY TO BUY MORE.: SOMETIMES TRUE

## 2025-08-20 SDOH — ECONOMIC STABILITY: HOUSING INSECURITY: IN THE LAST 12 MONTHS, WAS THERE A TIME WHEN YOU WERE NOT ABLE TO PAY THE MORTGAGE OR RENT ON TIME?: PATIENT DECLINED

## 2025-08-20 SDOH — ECONOMIC STABILITY: HOUSING INSECURITY: IN THE PAST 12 MONTHS HAS THE ELECTRIC, GAS, OIL, OR WATER COMPANY THREATENED TO SHUT OFF SERVICES IN YOUR HOME?: NO

## 2025-08-20 SDOH — ECONOMIC STABILITY: TRANSPORTATION INSECURITY: IN THE PAST 12 MONTHS, HAS LACK OF TRANSPORTATION KEPT YOU FROM MEDICAL APPOINTMENTS OR FROM GETTING MEDICATIONS?: NO

## 2025-08-20 SDOH — ECONOMIC STABILITY: FOOD INSECURITY

## 2025-08-20 SDOH — ECONOMIC STABILITY: INCOME INSECURITY: IN THE LAST 12 MONTHS, WAS THERE A TIME WHEN YOU WERE NOT ABLE TO PAY THE MORTGAGE OR RENT ON TIME?: PATIENT DECLINED

## 2025-08-20 SDOH — ECONOMIC STABILITY: FOOD INSECURITY: WITHIN THE PAST 12 MONTHS, THE FOOD YOU BOUGHT JUST DIDN'T LAST AND YOU DIDN'T HAVE MONEY TO GET MORE.: SOMETIMES TRUE

## 2025-08-20 SDOH — ECONOMIC STABILITY: GENERAL

## 2025-08-20 SDOH — ECONOMIC STABILITY: HOUSING INSECURITY
IN THE LAST 12 MONTHS, WAS THERE A TIME WHEN YOU DID NOT HAVE A STEADY PLACE TO SLEEP OR SLEPT IN A SHELTER (INCLUDING NOW)?: NO

## 2025-08-20 SDOH — ECONOMIC STABILITY: TRANSPORTATION INSECURITY

## 2025-08-20 SDOH — ECONOMIC STABILITY: TRANSPORTATION INSECURITY
IN THE PAST 12 MONTHS, HAS THE LACK OF TRANSPORTATION KEPT YOU FROM MEDICAL APPOINTMENTS OR FROM GETTING MEDICATIONS?: NO

## 2025-08-20 SDOH — ECONOMIC STABILITY: HOUSING INSECURITY: IN THE LAST 12 MONTHS, HOW MANY PLACES HAVE YOU LIVED?: 1

## 2025-08-20 SDOH — ECONOMIC STABILITY: HOUSING INSECURITY

## 2025-08-20 ASSESSMENT — PAIN DESCRIPTION - DESCRIPTORS
DESCRIPTORS: ACHING;DISCOMFORT;CRAMPING
DESCRIPTORS: ACHING;DISCOMFORT;PRESSURE
DESCRIPTORS: ACHING

## 2025-08-20 ASSESSMENT — PAIN SCALES - GENERAL
PAINLEVEL_OUTOF10: 0 - NO PAIN
PAINLEVEL_OUTOF10: 0 - NO PAIN
PAINLEVEL_OUTOF10: 8
PAINLEVEL_OUTOF10: 7
PAINLEVEL_OUTOF10: 3
PAINLEVEL_OUTOF10: 0 - NO PAIN
PAINLEVEL_OUTOF10: 10 - WORST POSSIBLE PAIN

## 2025-08-20 ASSESSMENT — PAIN - FUNCTIONAL ASSESSMENT
PAIN_FUNCTIONAL_ASSESSMENT: 0-10

## 2025-08-20 ASSESSMENT — SOCIAL DETERMINANTS OF HEALTH (SDOH): IN THE PAST 12 MONTHS, HAS THE ELECTRIC, GAS, OIL, OR WATER COMPANY THREATENED TO SHUT OFF SERVICE IN YOUR HOME?: NO

## 2025-08-20 ASSESSMENT — PAIN DESCRIPTION - LOCATION
LOCATION: ABDOMEN
LOCATION: VAGINA
LOCATION: ABDOMEN

## 2025-08-20 ASSESSMENT — ACTIVITIES OF DAILY LIVING (ADL): LACK_OF_TRANSPORTATION: NO

## 2025-08-21 PROCEDURE — 99232 SBSQ HOSP IP/OBS MODERATE 35: CPT | Performed by: INTERNAL MEDICINE

## 2025-08-21 PROCEDURE — 1100000001 HC PRIVATE ROOM DAILY

## 2025-08-21 PROCEDURE — 99024 POSTOP FOLLOW-UP VISIT: CPT | Performed by: NURSE PRACTITIONER

## 2025-08-21 PROCEDURE — 2500000004 HC RX 250 GENERAL PHARMACY W/ HCPCS (ALT 636 FOR OP/ED): Performed by: NURSE PRACTITIONER

## 2025-08-21 PROCEDURE — 2500000002 HC RX 250 W HCPCS SELF ADMINISTERED DRUGS (ALT 637 FOR MEDICARE OP, ALT 636 FOR OP/ED): Performed by: NURSE PRACTITIONER

## 2025-08-21 PROCEDURE — 2500000005 HC RX 250 GENERAL PHARMACY W/O HCPCS: Performed by: NURSE PRACTITIONER

## 2025-08-21 PROCEDURE — 2500000001 HC RX 250 WO HCPCS SELF ADMINISTERED DRUGS (ALT 637 FOR MEDICARE OP): Performed by: NURSE PRACTITIONER

## 2025-08-21 RX ORDER — ESTRADIOL 1.25 MG/1.25G
3.75 GEL TOPICAL DAILY
Status: DISCONTINUED | OUTPATIENT
Start: 2025-08-21 | End: 2025-08-25 | Stop reason: HOSPADM

## 2025-08-21 RX ADMIN — OXYBUTYNIN CHLORIDE 5 MG: 5 TABLET ORAL at 16:06

## 2025-08-21 RX ADMIN — SENNOSIDES AND DOCUSATE SODIUM 2 TABLET: 50; 8.6 TABLET ORAL at 09:13

## 2025-08-21 RX ADMIN — METHOCARBAMOL 500 MG: 500 TABLET ORAL at 03:03

## 2025-08-21 RX ADMIN — OXYCODONE HYDROCHLORIDE 10 MG: 5 TABLET ORAL at 23:02

## 2025-08-21 RX ADMIN — SENNOSIDES AND DOCUSATE SODIUM 2 TABLET: 50; 8.6 TABLET ORAL at 21:20

## 2025-08-21 RX ADMIN — CIPROFLOXACIN 400 MG: 2 INJECTION, SOLUTION INTRAVENOUS at 21:21

## 2025-08-21 RX ADMIN — ACETAMINOPHEN 975 MG: 325 TABLET ORAL at 03:03

## 2025-08-21 RX ADMIN — LAMOTRIGINE 150 MG: 100 TABLET ORAL at 09:14

## 2025-08-21 RX ADMIN — OXYBUTYNIN CHLORIDE 5 MG: 5 TABLET ORAL at 09:13

## 2025-08-21 RX ADMIN — METHOCARBAMOL 500 MG: 500 TABLET ORAL at 16:06

## 2025-08-21 RX ADMIN — POLYETHYLENE GLYCOL 3350 17 G: 17 POWDER, FOR SOLUTION ORAL at 09:13

## 2025-08-21 RX ADMIN — ESTRADIOL 3.75 MG: 1.25 GEL TOPICAL at 16:06

## 2025-08-21 RX ADMIN — PRAZOSIN HYDROCHLORIDE 2 MG: 2 CAPSULE ORAL at 21:20

## 2025-08-21 RX ADMIN — PROGESTERONE 200 MG: 200 CAPSULE ORAL at 21:20

## 2025-08-21 RX ADMIN — PANTOPRAZOLE SODIUM 40 MG: 40 TABLET, DELAYED RELEASE ORAL at 06:06

## 2025-08-21 RX ADMIN — LIDOCAINE 1 PATCH: 4 PATCH TOPICAL at 16:06

## 2025-08-21 RX ADMIN — ENOXAPARIN SODIUM 40 MG: 100 INJECTION SUBCUTANEOUS at 09:13

## 2025-08-21 RX ADMIN — LAMOTRIGINE 150 MG: 100 TABLET ORAL at 21:21

## 2025-08-21 RX ADMIN — ARIPIPRAZOLE 20 MG: 10 TABLET ORAL at 09:14

## 2025-08-21 RX ADMIN — TRAZODONE HYDROCHLORIDE 100 MG: 50 TABLET ORAL at 23:02

## 2025-08-21 RX ADMIN — ZONISAMIDE 200 MG: 100 CAPSULE ORAL at 21:21

## 2025-08-21 RX ADMIN — OXYCODONE HYDROCHLORIDE 10 MG: 5 TABLET ORAL at 18:31

## 2025-08-21 RX ADMIN — OXYCODONE HYDROCHLORIDE 10 MG: 5 TABLET ORAL at 12:31

## 2025-08-21 RX ADMIN — CIPROFLOXACIN 400 MG: 2 INJECTION, SOLUTION INTRAVENOUS at 09:13

## 2025-08-21 RX ADMIN — ACETAMINOPHEN 975 MG: 325 TABLET ORAL at 16:06

## 2025-08-21 RX ADMIN — OXYBUTYNIN CHLORIDE 5 MG: 5 TABLET ORAL at 21:20

## 2025-08-21 RX ADMIN — LEVETIRACETAM 1500 MG: 500 TABLET, FILM COATED ORAL at 09:13

## 2025-08-21 RX ADMIN — ACETAMINOPHEN 975 MG: 325 TABLET ORAL at 09:13

## 2025-08-21 RX ADMIN — PANTOPRAZOLE SODIUM 40 MG: 40 TABLET, DELAYED RELEASE ORAL at 16:06

## 2025-08-21 RX ADMIN — LEVETIRACETAM 1500 MG: 500 TABLET, FILM COATED ORAL at 21:20

## 2025-08-21 RX ADMIN — CETIRIZINE HYDROCHLORIDE 10 MG: 10 TABLET, FILM COATED ORAL at 09:13

## 2025-08-21 ASSESSMENT — PAIN - FUNCTIONAL ASSESSMENT
PAIN_FUNCTIONAL_ASSESSMENT: 0-10

## 2025-08-21 ASSESSMENT — COGNITIVE AND FUNCTIONAL STATUS - GENERAL
MOVING TO AND FROM BED TO CHAIR: A LITTLE
HELP NEEDED FOR BATHING: A LITTLE
CLIMB 3 TO 5 STEPS WITH RAILING: A LOT
DRESSING REGULAR LOWER BODY CLOTHING: A LITTLE
MOBILITY SCORE: 19
WALKING IN HOSPITAL ROOM: A LITTLE
DAILY ACTIVITIY SCORE: 21
STANDING UP FROM CHAIR USING ARMS: A LITTLE
TOILETING: A LITTLE

## 2025-08-21 ASSESSMENT — PAIN SCALES - GENERAL
PAINLEVEL_OUTOF10: 0 - NO PAIN
PAINLEVEL_OUTOF10: 8
PAINLEVEL_OUTOF10: 7
PAINLEVEL_OUTOF10: 2
PAINLEVEL_OUTOF10: 0 - NO PAIN
PAINLEVEL_OUTOF10: 5 - MODERATE PAIN
PAINLEVEL_OUTOF10: 7

## 2025-08-21 ASSESSMENT — PAIN DESCRIPTION - DESCRIPTORS
DESCRIPTORS: ACHING
DESCRIPTORS: ACHING
DESCRIPTORS: ACHING;SORE

## 2025-08-21 ASSESSMENT — PAIN DESCRIPTION - LOCATION
LOCATION: VAGINA
LOCATION: VAGINA

## 2025-08-22 PROCEDURE — 99024 POSTOP FOLLOW-UP VISIT: CPT | Performed by: NURSE PRACTITIONER

## 2025-08-22 PROCEDURE — 99232 SBSQ HOSP IP/OBS MODERATE 35: CPT | Performed by: INTERNAL MEDICINE

## 2025-08-22 PROCEDURE — 1100000001 HC PRIVATE ROOM DAILY

## 2025-08-22 PROCEDURE — RXMED WILLOW AMBULATORY MEDICATION CHARGE

## 2025-08-22 PROCEDURE — 2500000005 HC RX 250 GENERAL PHARMACY W/O HCPCS: Performed by: NURSE PRACTITIONER

## 2025-08-22 PROCEDURE — 2500000001 HC RX 250 WO HCPCS SELF ADMINISTERED DRUGS (ALT 637 FOR MEDICARE OP): Performed by: UROLOGY

## 2025-08-22 PROCEDURE — 2500000004 HC RX 250 GENERAL PHARMACY W/ HCPCS (ALT 636 FOR OP/ED): Performed by: NURSE PRACTITIONER

## 2025-08-22 PROCEDURE — 2500000002 HC RX 250 W HCPCS SELF ADMINISTERED DRUGS (ALT 637 FOR MEDICARE OP, ALT 636 FOR OP/ED): Performed by: NURSE PRACTITIONER

## 2025-08-22 PROCEDURE — 2500000001 HC RX 250 WO HCPCS SELF ADMINISTERED DRUGS (ALT 637 FOR MEDICARE OP): Performed by: NURSE PRACTITIONER

## 2025-08-22 RX ORDER — TIZANIDINE 2 MG/1
2 TABLET ORAL 2 TIMES DAILY
Qty: 28 TABLET | Refills: 0 | Status: SHIPPED | OUTPATIENT
Start: 2025-08-22 | End: 2025-09-08

## 2025-08-22 RX ORDER — IBUPROFEN 600 MG/1
600 TABLET, FILM COATED ORAL EVERY 8 HOURS
Status: DISCONTINUED | OUTPATIENT
Start: 2025-08-22 | End: 2025-08-25 | Stop reason: HOSPADM

## 2025-08-22 RX ORDER — LIDOCAINE 560 MG/1
1 PATCH PERCUTANEOUS; TOPICAL; TRANSDERMAL EVERY 24 HOURS
Qty: 14 PATCH | Refills: 0 | Status: SHIPPED | OUTPATIENT
Start: 2025-08-22 | End: 2025-09-08

## 2025-08-22 RX ORDER — POLYETHYLENE GLYCOL 3350 17 G/17G
17 POWDER, FOR SOLUTION ORAL DAILY
Qty: 238 G | Refills: 0 | Status: SHIPPED | OUTPATIENT
Start: 2025-08-23

## 2025-08-22 RX ORDER — CIPROFLOXACIN 500 MG/1
500 TABLET, FILM COATED ORAL EVERY 12 HOURS SCHEDULED
Status: COMPLETED | OUTPATIENT
Start: 2025-08-22 | End: 2025-08-25

## 2025-08-22 RX ORDER — BACITRACIN ZINC 500 UNIT/G
OINTMENT (GRAM) TOPICAL DAILY
Qty: 28 G | Refills: 0 | Status: SHIPPED | OUTPATIENT
Start: 2025-08-22 | End: 2025-09-22

## 2025-08-22 RX ORDER — OXYCODONE HYDROCHLORIDE 5 MG/1
5 TABLET ORAL EVERY 6 HOURS PRN
Qty: 20 TABLET | Refills: 0 | Status: SHIPPED | OUTPATIENT
Start: 2025-08-22 | End: 2025-08-30

## 2025-08-22 RX ORDER — AMOXICILLIN 250 MG
1 CAPSULE ORAL 2 TIMES DAILY
Qty: 28 TABLET | Refills: 0 | Status: SHIPPED | OUTPATIENT
Start: 2025-08-22 | End: 2025-09-08

## 2025-08-22 RX ORDER — ACETAMINOPHEN 500 MG
1000 TABLET ORAL EVERY 8 HOURS
Qty: 84 TABLET | Refills: 0 | Status: SHIPPED | OUTPATIENT
Start: 2025-08-22 | End: 2025-09-08

## 2025-08-22 RX ADMIN — TRAZODONE HYDROCHLORIDE 100 MG: 50 TABLET ORAL at 20:56

## 2025-08-22 RX ADMIN — ACETAMINOPHEN 975 MG: 325 TABLET ORAL at 20:56

## 2025-08-22 RX ADMIN — METHOCARBAMOL 500 MG: 500 TABLET ORAL at 04:05

## 2025-08-22 RX ADMIN — ACETAMINOPHEN 975 MG: 325 TABLET ORAL at 10:27

## 2025-08-22 RX ADMIN — LEVETIRACETAM 1500 MG: 500 TABLET, FILM COATED ORAL at 20:56

## 2025-08-22 RX ADMIN — PANTOPRAZOLE SODIUM 40 MG: 40 TABLET, DELAYED RELEASE ORAL at 06:19

## 2025-08-22 RX ADMIN — ACETAMINOPHEN 975 MG: 325 TABLET ORAL at 16:32

## 2025-08-22 RX ADMIN — LAMOTRIGINE 150 MG: 100 TABLET ORAL at 20:57

## 2025-08-22 RX ADMIN — LIDOCAINE 1 PATCH: 4 PATCH TOPICAL at 16:32

## 2025-08-22 RX ADMIN — LAMOTRIGINE 150 MG: 100 TABLET ORAL at 10:27

## 2025-08-22 RX ADMIN — ESTRADIOL 3.75 MG: 1.25 GEL TOPICAL at 08:50

## 2025-08-22 RX ADMIN — OXYBUTYNIN CHLORIDE 5 MG: 5 TABLET ORAL at 16:32

## 2025-08-22 RX ADMIN — CIPROFLOXACIN 500 MG: 500 TABLET ORAL at 20:56

## 2025-08-22 RX ADMIN — SENNOSIDES AND DOCUSATE SODIUM 2 TABLET: 50; 8.6 TABLET ORAL at 20:56

## 2025-08-22 RX ADMIN — OXYBUTYNIN CHLORIDE 5 MG: 5 TABLET ORAL at 20:56

## 2025-08-22 RX ADMIN — ZONISAMIDE 200 MG: 100 CAPSULE ORAL at 20:57

## 2025-08-22 RX ADMIN — METHOCARBAMOL 500 MG: 500 TABLET ORAL at 16:32

## 2025-08-22 RX ADMIN — CIPROFLOXACIN 400 MG: 2 INJECTION, SOLUTION INTRAVENOUS at 08:44

## 2025-08-22 RX ADMIN — PROGESTERONE 200 MG: 200 CAPSULE ORAL at 20:56

## 2025-08-22 RX ADMIN — ENOXAPARIN SODIUM 40 MG: 100 INJECTION SUBCUTANEOUS at 08:44

## 2025-08-22 RX ADMIN — POLYETHYLENE GLYCOL 3350 17 G: 17 POWDER, FOR SOLUTION ORAL at 08:44

## 2025-08-22 RX ADMIN — PANTOPRAZOLE SODIUM 40 MG: 40 TABLET, DELAYED RELEASE ORAL at 16:32

## 2025-08-22 RX ADMIN — ARIPIPRAZOLE 20 MG: 10 TABLET ORAL at 10:27

## 2025-08-22 RX ADMIN — PRAZOSIN HYDROCHLORIDE 2 MG: 2 CAPSULE ORAL at 20:57

## 2025-08-22 RX ADMIN — SENNOSIDES AND DOCUSATE SODIUM 2 TABLET: 50; 8.6 TABLET ORAL at 08:44

## 2025-08-22 RX ADMIN — LEVETIRACETAM 1500 MG: 500 TABLET, FILM COATED ORAL at 08:44

## 2025-08-22 RX ADMIN — IBUPROFEN 600 MG: 600 TABLET ORAL at 12:07

## 2025-08-22 RX ADMIN — IBUPROFEN 600 MG: 600 TABLET ORAL at 20:56

## 2025-08-22 RX ADMIN — CETIRIZINE HYDROCHLORIDE 10 MG: 10 TABLET, FILM COATED ORAL at 08:44

## 2025-08-22 RX ADMIN — ACETAMINOPHEN 975 MG: 325 TABLET ORAL at 04:05

## 2025-08-22 RX ADMIN — OXYBUTYNIN CHLORIDE 5 MG: 5 TABLET ORAL at 08:44

## 2025-08-22 ASSESSMENT — COGNITIVE AND FUNCTIONAL STATUS - GENERAL
WALKING IN HOSPITAL ROOM: A LITTLE
CLIMB 3 TO 5 STEPS WITH RAILING: A LITTLE
WALKING IN HOSPITAL ROOM: A LITTLE
CLIMB 3 TO 5 STEPS WITH RAILING: A LITTLE
DRESSING REGULAR LOWER BODY CLOTHING: A LITTLE
DAILY ACTIVITIY SCORE: 23
DRESSING REGULAR LOWER BODY CLOTHING: A LITTLE
MOBILITY SCORE: 22
DAILY ACTIVITIY SCORE: 23
MOBILITY SCORE: 22

## 2025-08-22 ASSESSMENT — PAIN - FUNCTIONAL ASSESSMENT: PAIN_FUNCTIONAL_ASSESSMENT: 0-10

## 2025-08-22 ASSESSMENT — PAIN SCALES - GENERAL
PAINLEVEL_OUTOF10: 0 - NO PAIN

## 2025-08-23 PROCEDURE — 99231 SBSQ HOSP IP/OBS SF/LOW 25: CPT | Performed by: INTERNAL MEDICINE

## 2025-08-23 PROCEDURE — 2500000005 HC RX 250 GENERAL PHARMACY W/O HCPCS: Performed by: NURSE PRACTITIONER

## 2025-08-23 PROCEDURE — 2500000002 HC RX 250 W HCPCS SELF ADMINISTERED DRUGS (ALT 637 FOR MEDICARE OP, ALT 636 FOR OP/ED): Performed by: NURSE PRACTITIONER

## 2025-08-23 PROCEDURE — 2500000001 HC RX 250 WO HCPCS SELF ADMINISTERED DRUGS (ALT 637 FOR MEDICARE OP): Performed by: NURSE PRACTITIONER

## 2025-08-23 PROCEDURE — 2500000001 HC RX 250 WO HCPCS SELF ADMINISTERED DRUGS (ALT 637 FOR MEDICARE OP): Performed by: UROLOGY

## 2025-08-23 PROCEDURE — 1100000001 HC PRIVATE ROOM DAILY

## 2025-08-23 PROCEDURE — 2500000004 HC RX 250 GENERAL PHARMACY W/ HCPCS (ALT 636 FOR OP/ED): Performed by: NURSE PRACTITIONER

## 2025-08-23 RX ADMIN — OXYBUTYNIN CHLORIDE 5 MG: 5 TABLET ORAL at 15:20

## 2025-08-23 RX ADMIN — ENOXAPARIN SODIUM 40 MG: 100 INJECTION SUBCUTANEOUS at 08:22

## 2025-08-23 RX ADMIN — LAMOTRIGINE 150 MG: 100 TABLET ORAL at 21:01

## 2025-08-23 RX ADMIN — LEVETIRACETAM 1500 MG: 500 TABLET, FILM COATED ORAL at 21:01

## 2025-08-23 RX ADMIN — OXYBUTYNIN CHLORIDE 5 MG: 5 TABLET ORAL at 08:22

## 2025-08-23 RX ADMIN — PROGESTERONE 200 MG: 200 CAPSULE ORAL at 21:02

## 2025-08-23 RX ADMIN — CIPROFLOXACIN 500 MG: 500 TABLET ORAL at 08:23

## 2025-08-23 RX ADMIN — METHOCARBAMOL 500 MG: 500 TABLET ORAL at 15:20

## 2025-08-23 RX ADMIN — IBUPROFEN 600 MG: 600 TABLET ORAL at 18:22

## 2025-08-23 RX ADMIN — METHOCARBAMOL 500 MG: 500 TABLET ORAL at 04:21

## 2025-08-23 RX ADMIN — PRAZOSIN HYDROCHLORIDE 2 MG: 2 CAPSULE ORAL at 21:02

## 2025-08-23 RX ADMIN — PANTOPRAZOLE SODIUM 40 MG: 40 TABLET, DELAYED RELEASE ORAL at 15:20

## 2025-08-23 RX ADMIN — TRAZODONE HYDROCHLORIDE 100 MG: 50 TABLET ORAL at 21:01

## 2025-08-23 RX ADMIN — IBUPROFEN 600 MG: 600 TABLET ORAL at 04:21

## 2025-08-23 RX ADMIN — CETIRIZINE HYDROCHLORIDE 10 MG: 10 TABLET, FILM COATED ORAL at 08:22

## 2025-08-23 RX ADMIN — LEVETIRACETAM 1500 MG: 500 TABLET, FILM COATED ORAL at 08:22

## 2025-08-23 RX ADMIN — IBUPROFEN 600 MG: 600 TABLET ORAL at 11:16

## 2025-08-23 RX ADMIN — OXYCODONE HYDROCHLORIDE 10 MG: 5 TABLET ORAL at 05:04

## 2025-08-23 RX ADMIN — ACETAMINOPHEN 975 MG: 325 TABLET ORAL at 21:02

## 2025-08-23 RX ADMIN — SENNOSIDES AND DOCUSATE SODIUM 2 TABLET: 50; 8.6 TABLET ORAL at 21:03

## 2025-08-23 RX ADMIN — ESTRADIOL 3.75 MG: 1.25 GEL TOPICAL at 08:30

## 2025-08-23 RX ADMIN — ACETAMINOPHEN 975 MG: 325 TABLET ORAL at 04:21

## 2025-08-23 RX ADMIN — ACETAMINOPHEN 975 MG: 325 TABLET ORAL at 10:28

## 2025-08-23 RX ADMIN — OXYBUTYNIN CHLORIDE 5 MG: 5 TABLET ORAL at 21:03

## 2025-08-23 RX ADMIN — LIDOCAINE 1 PATCH: 4 PATCH TOPICAL at 15:21

## 2025-08-23 RX ADMIN — SENNOSIDES AND DOCUSATE SODIUM 2 TABLET: 50; 8.6 TABLET ORAL at 08:22

## 2025-08-23 RX ADMIN — PANTOPRAZOLE SODIUM 40 MG: 40 TABLET, DELAYED RELEASE ORAL at 06:25

## 2025-08-23 RX ADMIN — ARIPIPRAZOLE 20 MG: 10 TABLET ORAL at 08:23

## 2025-08-23 RX ADMIN — ZONISAMIDE 200 MG: 100 CAPSULE ORAL at 21:03

## 2025-08-23 RX ADMIN — CIPROFLOXACIN 500 MG: 500 TABLET ORAL at 21:02

## 2025-08-23 RX ADMIN — POLYETHYLENE GLYCOL 3350 17 G: 17 POWDER, FOR SOLUTION ORAL at 08:22

## 2025-08-23 RX ADMIN — LAMOTRIGINE 150 MG: 100 TABLET ORAL at 08:24

## 2025-08-23 ASSESSMENT — COGNITIVE AND FUNCTIONAL STATUS - GENERAL
CLIMB 3 TO 5 STEPS WITH RAILING: A LITTLE
DAILY ACTIVITIY SCORE: 23
DRESSING REGULAR LOWER BODY CLOTHING: A LITTLE
MOBILITY SCORE: 22
WALKING IN HOSPITAL ROOM: A LITTLE

## 2025-08-23 ASSESSMENT — PAIN - FUNCTIONAL ASSESSMENT
PAIN_FUNCTIONAL_ASSESSMENT: 0-10

## 2025-08-23 ASSESSMENT — PAIN DESCRIPTION - DESCRIPTORS
DESCRIPTORS: ACHING;SORE
DESCRIPTORS: ACHING;SORE

## 2025-08-23 ASSESSMENT — PAIN SCALES - GENERAL
PAINLEVEL_OUTOF10: 2
PAINLEVEL_OUTOF10: 2
PAINLEVEL_OUTOF10: 7
PAINLEVEL_OUTOF10: 3

## 2025-08-23 ASSESSMENT — PAIN DESCRIPTION - LOCATION: LOCATION: VAGINA

## 2025-08-24 VITALS
WEIGHT: 262.79 LBS | TEMPERATURE: 97.5 F | HEART RATE: 61 BPM | BODY MASS INDEX: 35.59 KG/M2 | OXYGEN SATURATION: 96 % | SYSTOLIC BLOOD PRESSURE: 138 MMHG | RESPIRATION RATE: 20 BRPM | DIASTOLIC BLOOD PRESSURE: 86 MMHG | HEIGHT: 72 IN

## 2025-08-24 PROCEDURE — 2500000002 HC RX 250 W HCPCS SELF ADMINISTERED DRUGS (ALT 637 FOR MEDICARE OP, ALT 636 FOR OP/ED): Performed by: NURSE PRACTITIONER

## 2025-08-24 PROCEDURE — 99232 SBSQ HOSP IP/OBS MODERATE 35: CPT | Performed by: INTERNAL MEDICINE

## 2025-08-24 PROCEDURE — 2500000001 HC RX 250 WO HCPCS SELF ADMINISTERED DRUGS (ALT 637 FOR MEDICARE OP): Performed by: NURSE PRACTITIONER

## 2025-08-24 PROCEDURE — 2500000005 HC RX 250 GENERAL PHARMACY W/O HCPCS: Performed by: NURSE PRACTITIONER

## 2025-08-24 PROCEDURE — 2500000004 HC RX 250 GENERAL PHARMACY W/ HCPCS (ALT 636 FOR OP/ED): Performed by: NURSE PRACTITIONER

## 2025-08-24 PROCEDURE — 2500000001 HC RX 250 WO HCPCS SELF ADMINISTERED DRUGS (ALT 637 FOR MEDICARE OP): Performed by: UROLOGY

## 2025-08-24 PROCEDURE — 1100000001 HC PRIVATE ROOM DAILY

## 2025-08-24 RX ADMIN — ACETAMINOPHEN 975 MG: 325 TABLET ORAL at 15:27

## 2025-08-24 RX ADMIN — LEVETIRACETAM 1500 MG: 500 TABLET, FILM COATED ORAL at 08:47

## 2025-08-24 RX ADMIN — OXYBUTYNIN CHLORIDE 5 MG: 5 TABLET ORAL at 08:47

## 2025-08-24 RX ADMIN — SENNOSIDES AND DOCUSATE SODIUM 2 TABLET: 50; 8.6 TABLET ORAL at 21:10

## 2025-08-24 RX ADMIN — OXYCODONE HYDROCHLORIDE 5 MG: 5 TABLET ORAL at 12:27

## 2025-08-24 RX ADMIN — TRAZODONE HYDROCHLORIDE 100 MG: 50 TABLET ORAL at 21:11

## 2025-08-24 RX ADMIN — CIPROFLOXACIN 500 MG: 500 TABLET ORAL at 21:10

## 2025-08-24 RX ADMIN — METHOCARBAMOL 500 MG: 500 TABLET ORAL at 03:31

## 2025-08-24 RX ADMIN — ACETAMINOPHEN 975 MG: 325 TABLET ORAL at 21:10

## 2025-08-24 RX ADMIN — OXYBUTYNIN CHLORIDE 5 MG: 5 TABLET ORAL at 21:12

## 2025-08-24 RX ADMIN — LIDOCAINE 1 PATCH: 4 PATCH TOPICAL at 15:27

## 2025-08-24 RX ADMIN — LAMOTRIGINE 150 MG: 100 TABLET ORAL at 08:49

## 2025-08-24 RX ADMIN — ACETAMINOPHEN 975 MG: 325 TABLET ORAL at 03:30

## 2025-08-24 RX ADMIN — IBUPROFEN 600 MG: 600 TABLET ORAL at 18:19

## 2025-08-24 RX ADMIN — PROGESTERONE 200 MG: 200 CAPSULE ORAL at 21:11

## 2025-08-24 RX ADMIN — PANTOPRAZOLE SODIUM 40 MG: 40 TABLET, DELAYED RELEASE ORAL at 15:27

## 2025-08-24 RX ADMIN — LAMOTRIGINE 150 MG: 100 TABLET ORAL at 21:11

## 2025-08-24 RX ADMIN — PANTOPRAZOLE SODIUM 40 MG: 40 TABLET, DELAYED RELEASE ORAL at 06:00

## 2025-08-24 RX ADMIN — IBUPROFEN 600 MG: 600 TABLET ORAL at 03:30

## 2025-08-24 RX ADMIN — ARIPIPRAZOLE 20 MG: 10 TABLET ORAL at 08:48

## 2025-08-24 RX ADMIN — ESTRADIOL 3.75 MG: 1.25 GEL TOPICAL at 08:49

## 2025-08-24 RX ADMIN — OXYBUTYNIN CHLORIDE 5 MG: 5 TABLET ORAL at 15:27

## 2025-08-24 RX ADMIN — CETIRIZINE HYDROCHLORIDE 10 MG: 10 TABLET, FILM COATED ORAL at 08:47

## 2025-08-24 RX ADMIN — IBUPROFEN 600 MG: 600 TABLET ORAL at 11:19

## 2025-08-24 RX ADMIN — LEVETIRACETAM 1500 MG: 500 TABLET, FILM COATED ORAL at 21:11

## 2025-08-24 RX ADMIN — ZONISAMIDE 200 MG: 100 CAPSULE ORAL at 21:11

## 2025-08-24 RX ADMIN — CIPROFLOXACIN 500 MG: 500 TABLET ORAL at 11:19

## 2025-08-24 RX ADMIN — ENOXAPARIN SODIUM 40 MG: 100 INJECTION SUBCUTANEOUS at 08:47

## 2025-08-24 RX ADMIN — PRAZOSIN HYDROCHLORIDE 2 MG: 2 CAPSULE ORAL at 21:12

## 2025-08-24 RX ADMIN — ACETAMINOPHEN 975 MG: 325 TABLET ORAL at 11:19

## 2025-08-24 RX ADMIN — SENNOSIDES AND DOCUSATE SODIUM 2 TABLET: 50; 8.6 TABLET ORAL at 08:48

## 2025-08-24 RX ADMIN — METHOCARBAMOL 500 MG: 500 TABLET ORAL at 15:27

## 2025-08-24 RX ADMIN — POLYETHYLENE GLYCOL 3350 17 G: 17 POWDER, FOR SOLUTION ORAL at 08:48

## 2025-08-24 ASSESSMENT — PAIN SCALES - GENERAL
PAINLEVEL_OUTOF10: 6
PAINLEVEL_OUTOF10: 0 - NO PAIN
PAINLEVEL_OUTOF10: 1

## 2025-08-24 ASSESSMENT — PAIN DESCRIPTION - DESCRIPTORS: DESCRIPTORS: ACHING

## 2025-08-24 ASSESSMENT — PAIN - FUNCTIONAL ASSESSMENT
PAIN_FUNCTIONAL_ASSESSMENT: 0-10

## 2025-08-24 ASSESSMENT — PAIN DESCRIPTION - LOCATION: LOCATION: VAGINA

## 2025-08-25 ENCOUNTER — PHARMACY VISIT (OUTPATIENT)
Dept: PHARMACY | Facility: CLINIC | Age: 35
End: 2025-08-25
Payer: COMMERCIAL

## 2025-08-25 VITALS
OXYGEN SATURATION: 97 % | WEIGHT: 262.79 LBS | BODY MASS INDEX: 35.59 KG/M2 | HEART RATE: 70 BPM | TEMPERATURE: 97 F | DIASTOLIC BLOOD PRESSURE: 79 MMHG | RESPIRATION RATE: 18 BRPM | HEIGHT: 72 IN | SYSTOLIC BLOOD PRESSURE: 131 MMHG

## 2025-08-25 PROCEDURE — 2500000001 HC RX 250 WO HCPCS SELF ADMINISTERED DRUGS (ALT 637 FOR MEDICARE OP): Performed by: NURSE PRACTITIONER

## 2025-08-25 PROCEDURE — 99024 POSTOP FOLLOW-UP VISIT: CPT | Performed by: NURSE PRACTITIONER

## 2025-08-25 PROCEDURE — 2500000004 HC RX 250 GENERAL PHARMACY W/ HCPCS (ALT 636 FOR OP/ED): Performed by: NURSE PRACTITIONER

## 2025-08-25 PROCEDURE — 2500000001 HC RX 250 WO HCPCS SELF ADMINISTERED DRUGS (ALT 637 FOR MEDICARE OP): Performed by: UROLOGY

## 2025-08-25 PROCEDURE — 2500000002 HC RX 250 W HCPCS SELF ADMINISTERED DRUGS (ALT 637 FOR MEDICARE OP, ALT 636 FOR OP/ED): Performed by: NURSE PRACTITIONER

## 2025-08-25 RX ADMIN — PANTOPRAZOLE SODIUM 40 MG: 40 TABLET, DELAYED RELEASE ORAL at 06:09

## 2025-08-25 RX ADMIN — ARIPIPRAZOLE 20 MG: 10 TABLET ORAL at 08:14

## 2025-08-25 RX ADMIN — LAMOTRIGINE 150 MG: 100 TABLET ORAL at 08:14

## 2025-08-25 RX ADMIN — CETIRIZINE HYDROCHLORIDE 10 MG: 10 TABLET, FILM COATED ORAL at 08:13

## 2025-08-25 RX ADMIN — IBUPROFEN 600 MG: 600 TABLET ORAL at 03:15

## 2025-08-25 RX ADMIN — METHOCARBAMOL 500 MG: 500 TABLET ORAL at 03:15

## 2025-08-25 RX ADMIN — CIPROFLOXACIN 500 MG: 500 TABLET ORAL at 08:14

## 2025-08-25 RX ADMIN — OXYBUTYNIN CHLORIDE 5 MG: 5 TABLET ORAL at 08:13

## 2025-08-25 RX ADMIN — OXYCODONE HYDROCHLORIDE 5 MG: 5 TABLET ORAL at 08:15

## 2025-08-25 RX ADMIN — ENOXAPARIN SODIUM 40 MG: 100 INJECTION SUBCUTANEOUS at 08:13

## 2025-08-25 RX ADMIN — LEVETIRACETAM 1500 MG: 500 TABLET, FILM COATED ORAL at 08:13

## 2025-08-25 ASSESSMENT — PAIN SCALES - GENERAL
PAINLEVEL_OUTOF10: 0 - NO PAIN
PAINLEVEL_OUTOF10: 5 - MODERATE PAIN
PAINLEVEL_OUTOF10: 1

## 2025-08-25 ASSESSMENT — PAIN DESCRIPTION - DESCRIPTORS: DESCRIPTORS: DISCOMFORT

## 2025-08-25 ASSESSMENT — PAIN - FUNCTIONAL ASSESSMENT
PAIN_FUNCTIONAL_ASSESSMENT: 0-10

## 2025-08-27 ENCOUNTER — APPOINTMENT (OUTPATIENT)
Dept: RADIOLOGY | Facility: HOSPITAL | Age: 35
End: 2025-08-27
Payer: COMMERCIAL

## 2025-08-27 ENCOUNTER — HOSPITAL ENCOUNTER (EMERGENCY)
Facility: HOSPITAL | Age: 35
Discharge: HOME | End: 2025-08-27
Attending: EMERGENCY MEDICINE
Payer: COMMERCIAL

## 2025-08-27 DIAGNOSIS — F64.9 GENDER DYSPHORIA: ICD-10-CM

## 2025-08-27 PROCEDURE — 74177 CT ABD & PELVIS W/CONTRAST: CPT

## 2025-08-27 ASSESSMENT — PAIN DESCRIPTION - LOCATION: LOCATION: GROIN

## 2025-08-27 ASSESSMENT — PAIN DESCRIPTION - FREQUENCY: FREQUENCY: CONSTANT/CONTINUOUS

## 2025-08-27 ASSESSMENT — PAIN - FUNCTIONAL ASSESSMENT
PAIN_FUNCTIONAL_ASSESSMENT: 0-10
PAIN_FUNCTIONAL_ASSESSMENT: 0-10

## 2025-08-27 ASSESSMENT — PAIN SCALES - GENERAL
PAINLEVEL_OUTOF10: 2
PAINLEVEL_OUTOF10: 7

## 2025-08-27 ASSESSMENT — PAIN DESCRIPTION - DESCRIPTORS: DESCRIPTORS: SHARP

## 2025-08-27 ASSESSMENT — PAIN DESCRIPTION - PAIN TYPE: TYPE: SURGICAL PAIN

## 2025-09-02 ENCOUNTER — APPOINTMENT (OUTPATIENT)
Age: 35
End: 2025-09-02
Payer: COMMERCIAL

## 2025-09-02 ASSESSMENT — PATIENT HEALTH QUESTIONNAIRE - PHQ9
2. FEELING DOWN, DEPRESSED OR HOPELESS: NOT AT ALL
SUM OF ALL RESPONSES TO PHQ9 QUESTIONS 1 AND 2: 1
1. LITTLE INTEREST OR PLEASURE IN DOING THINGS: SEVERAL DAYS

## 2025-09-02 ASSESSMENT — ENCOUNTER SYMPTOMS: DEPRESSION: 0

## 2025-09-15 ENCOUNTER — APPOINTMENT (OUTPATIENT)
Age: 35
End: 2025-09-15
Payer: COMMERCIAL

## 2025-10-03 ENCOUNTER — APPOINTMENT (OUTPATIENT)
Dept: PRIMARY CARE | Facility: CLINIC | Age: 35
End: 2025-10-03
Payer: COMMERCIAL

## 2025-10-06 ENCOUNTER — APPOINTMENT (OUTPATIENT)
Age: 35
End: 2025-10-06
Payer: COMMERCIAL

## 2025-11-03 ENCOUNTER — APPOINTMENT (OUTPATIENT)
Age: 35
End: 2025-11-03
Payer: COMMERCIAL

## 2026-02-03 ENCOUNTER — APPOINTMENT (OUTPATIENT)
Age: 36
End: 2026-02-03
Payer: COMMERCIAL

## 2026-02-05 ENCOUNTER — APPOINTMENT (OUTPATIENT)
Dept: PRIMARY CARE | Facility: CLINIC | Age: 36
End: 2026-02-05
Payer: COMMERCIAL

## (undated) DEVICE — HEMOSTAT, ABSORABABLE, SURGICEL SNOW, 2 X 4, LF

## (undated) DEVICE — CUP, MEDICINE, GRADUATED, 2 OZ, PLASTIC, DISP, LF

## (undated) DEVICE — SEAL, UNIVERSAL, 5-12MM

## (undated) DEVICE — STAY SET, SURGICAL, 5MM SHARP HOOK, 8PK

## (undated) DEVICE — NEEDLE, PNEUMOPERITONEUM, 120MM

## (undated) DEVICE — COVER, TIP HOT SHEARS ENDOWRIST

## (undated) DEVICE — FOAM, GRAM, SILVER, MEDIUM DRESSING, VAC

## (undated) DEVICE — FORCEPS, BIPOLAR FENESTRATED XI

## (undated) DEVICE — GOWN, ASTOUND, XL

## (undated) DEVICE — TUBING, CLEAR N-COND, 5MM X 10, LF

## (undated) DEVICE — DILATOR, VAGINAL, GRS, SIZE P1, VIOLET

## (undated) DEVICE — SYRINGE, 30 CC, LUER LOCK

## (undated) DEVICE — LUBRICANT, ELECTROLUBE, F/ELECTRODE TIPS

## (undated) DEVICE — FORCEPS, PROGRASP, DAVINCI XI

## (undated) DEVICE — KIT, GENDER CARE, CUSTOM PARMA

## (undated) DEVICE — DRIVER, NEEDLE LARGE, DAVINCI XI

## (undated) DEVICE — SLEEVE, VASO PRESS, CALF GARMENT, MEDIUM, GREEN

## (undated) DEVICE — DILATOR, VAGINAL, GRS, SIZE 1, VIOLET

## (undated) DEVICE — DRAPE, TIBURON, SPLIT SHEET, REINF ADH STRIP, 77X108

## (undated) DEVICE — Device

## (undated) DEVICE — STAPLER, SKIN PROXIMATE, 35 WIDE

## (undated) DEVICE — ELECTROSURGICAL, CLEANER, LECTROBRASIVE

## (undated) DEVICE — WOUND VAC KIT, W/CANISTER, 500ML (5/CS)

## (undated) DEVICE — ASSEMBLY, STRYKER FLOW 2, SUCTION IRRIGATOR, WITH TIP

## (undated) DEVICE — DILATOR, VAGINAL, GRS, SIZE 2, BLUE

## (undated) DEVICE — DRAPE, ARM XI

## (undated) DEVICE — ACCESS PORT, 8MM, 120MM LENGTH LOW PRO W/BLADELESS OPTICAL TIP

## (undated) DEVICE — TISSEEL FIBRIN SEALANT, PRIMA, FROZEN, 10ML

## (undated) DEVICE — OBTURATOR, BLADELESS , SU

## (undated) DEVICE — SPONGE, NEURO, 1/2 X 3IN, STERILE, LF

## (undated) DEVICE — DILATOR, VAGINAL, GRS, SIZE 3, GREEN

## (undated) DEVICE — DILATOR, VAGINAL, GRS, SIZE P2, ORANGE

## (undated) DEVICE — DRAIN, PENROSE, 0.25 X 18 IN, LATEX, STERILE

## (undated) DEVICE — NEEDLE, SPINAL, 22 G X 3.5 IN, BLACK HUB

## (undated) DEVICE — CATHETER, URETHRAL, FOLEY, 2 WAY, BARDEX IC, 16 FR, 5 CC, SILICONE

## (undated) DEVICE — DRAPE, COLUMN, DAVINCI XI

## (undated) DEVICE — SYRINGE, 60 CC, IRRIGATION, PISTON, CATH TIP, W/LUER ADAPTER,DISP

## (undated) DEVICE — LIGASURE, CURVED, SMALL JAW

## (undated) DEVICE — DILATOR, VAGINAL, GRS, SIZE 4, ORANGE

## (undated) DEVICE — DRESSING, QUICKCLOT, HEMOSTATIC, 5 X 5

## (undated) DEVICE — SCISSORS, MONOPOLAR, CURVED, 8MM

## (undated) DEVICE — DRESSING, WOUND, MEPITEL, 4X8

## (undated) DEVICE — CAUTERY, PENCIL, PUSH BUTTON, SMOKE EVAC, 70MM

## (undated) DEVICE — DRESSING, HYDROCOLLOID, DUODERM CGF, 4 X 4 IN, STERILE

## (undated) DEVICE — KIT, APPLICATOR, DUPLOSPRAY, 30CM

## (undated) DEVICE — DRAPE, DERMATAC, 19.7 X 21CM

## (undated) DEVICE — DRAPE, INCISE, ANTIMICROBIAL, IOBAN 2, LARGE, 17 X 23 IN, DISPOSABLE, STERILE

## (undated) DEVICE — CONNECTOR, 5 IN 1, STERILE

## (undated) DEVICE — DRESSING, GAUZE, PETROLATUM, XEROFORM, 5 X 9 IN, STERILE

## (undated) DEVICE — RETRACTOR, CORDLESS, RADIALUX, LIGHTED